# Patient Record
Sex: FEMALE | Race: WHITE | NOT HISPANIC OR LATINO | Employment: OTHER | URBAN - METROPOLITAN AREA
[De-identification: names, ages, dates, MRNs, and addresses within clinical notes are randomized per-mention and may not be internally consistent; named-entity substitution may affect disease eponyms.]

---

## 2017-01-05 ENCOUNTER — TRANSFERRED RECORDS (OUTPATIENT)
Dept: HEALTH INFORMATION MANAGEMENT | Facility: CLINIC | Age: 67
End: 2017-01-05

## 2017-01-05 LAB
ALBUMIN SERPL-MCNC: NORMAL G/DL
ALP SERPL-CCNC: NORMAL U/L
ALT SERPL-CCNC: NORMAL U/L
ANION GAP SERPL CALCULATED.3IONS-SCNC: NORMAL MMOL/L
AST SERPL-CCNC: NORMAL U/L
BASOPHILS NFR BLD AUTO: 0.5 %
BILIRUB SERPL-MCNC: NORMAL MG/DL
BUN SERPL-MCNC: NORMAL MG/DL
CALCIUM SERPL-MCNC: NORMAL MG/DL
CHLORIDE SERPLBLD-SCNC: NORMAL MMOL/L
CHOLEST SERPL-MCNC: 236 MG/DL
CO2 SERPL-SCNC: NORMAL MMOL/L
CREAT SERPL-MCNC: NORMAL MG/DL
EOSINOPHIL NFR BLD AUTO: 3.6 %
ERYTHROCYTE [DISTWIDTH] IN BLOOD BY AUTOMATED COUNT: 13.7 %
GFR SERPL CREATININE-BSD FRML MDRD: NORMAL ML/MIN/1.73M2
GLUCOSE SERPL-MCNC: NORMAL MG/DL (ref 70–99)
HCT VFR BLD AUTO: 43.9 %
HDLC SERPL-MCNC: 96 MG/DL
HEMOGLOBIN: 14.2 G/DL (ref 11.7–15.7)
LDLC SERPL CALC-MCNC: 130.9 MG/DL
LYMPHOCYTES NFR BLD AUTO: 41.3 %
MCH RBC QN AUTO: 30.5 PG
MCHC RBC AUTO-ENTMCNC: 32.3 G/DL
MCV RBC AUTO: 94 FL
MONOCYTES NFR BLD AUTO: 9.1 %
NEUTROPHILS NFR BLD AUTO: 45.5 %
NONHDLC SERPL-MCNC: NORMAL MG/DL
PLATELET COUNT - QUEST: 274 10^9/L (ref 150–450)
POTASSIUM SERPL-SCNC: NORMAL MMOL/L
PROT SERPL-MCNC: NORMAL G/DL
RBC # BLD AUTO: 4.65 10^12/L
SODIUM SERPL-SCNC: NORMAL MMOL/L
TRIGL SERPL-MCNC: 45 MG/DL
TSH SERPL-ACNC: 0.71 MCU/ML
WBC # BLD AUTO: 4.2 10^9/L

## 2017-03-22 DIAGNOSIS — E78.00 HIGH BLOOD CHOLESTEROL: ICD-10-CM

## 2017-03-22 DIAGNOSIS — E03.8 OTHER SPECIFIED HYPOTHYROIDISM: Primary | ICD-10-CM

## 2017-03-22 DIAGNOSIS — R73.03 PRE-DIABETES: ICD-10-CM

## 2017-03-22 NOTE — NURSING NOTE
Pt requesting A1C Labs be done at next appointment with Dr. Buckley scheduled on 4/10. I didn't see Orders in chart for labs, so noted in appointment notes and sending message to give heads-up.     Pt also took a double dose of her thyroid medication today and wanted to know if she should skip next dose?     Please give Pt a call back at [phone]. Okay to leave message.     Thank you,     Diana   Call Center     Per Ina, she will order lipid panel and TSH reflex, but no A1C needed.  As for the thyroid med, she should skip tomorrows dose , then go back to regular one dose daily on Friday.     Called pt and had to LVM with this info.  Was Okay to LVM.  Lab orders placed     Brittney Rushing RN  March 22, 2017 1:07 PM

## 2017-04-04 DIAGNOSIS — E03.9 HYPOTHYROIDISM, UNSPECIFIED TYPE: ICD-10-CM

## 2017-04-04 RX ORDER — LEVOTHYROXINE SODIUM 100 MCG
100 TABLET ORAL DAILY
Qty: 90 TABLET | Refills: 0 | Status: SHIPPED | OUTPATIENT
Start: 2017-04-04 | End: 2017-04-10

## 2017-04-10 ENCOUNTER — OFFICE VISIT (OUTPATIENT)
Dept: FAMILY MEDICINE | Facility: CLINIC | Age: 67
End: 2017-04-10

## 2017-04-10 VITALS
DIASTOLIC BLOOD PRESSURE: 67 MMHG | BODY MASS INDEX: 22.01 KG/M2 | WEIGHT: 132.12 LBS | OXYGEN SATURATION: 99 % | HEART RATE: 69 BPM | HEIGHT: 65 IN | TEMPERATURE: 97.7 F | SYSTOLIC BLOOD PRESSURE: 99 MMHG

## 2017-04-10 DIAGNOSIS — M26.609 TMJ (TEMPOROMANDIBULAR JOINT SYNDROME): ICD-10-CM

## 2017-04-10 DIAGNOSIS — R73.03 PRE-DIABETES: Primary | ICD-10-CM

## 2017-04-10 DIAGNOSIS — E03.9 HYPOTHYROIDISM, UNSPECIFIED TYPE: ICD-10-CM

## 2017-04-10 RX ORDER — LEVOTHYROXINE SODIUM 100 MCG
100 TABLET ORAL DAILY
Qty: 90 TABLET | Refills: 3 | Status: SHIPPED | OUTPATIENT
Start: 2017-04-10 | End: 2018-06-12 | Stop reason: DRUGHIGH

## 2017-04-10 ASSESSMENT — PAIN SCALES - GENERAL: PAINLEVEL: NO PAIN (0)

## 2017-04-10 NOTE — NURSING NOTE
"66 year old  Chief Complaint   Patient presents with     Refill Request     on Synthroid.     Results     review her lab results.       Blood pressure 99/67, pulse 69, temperature 97.7  F (36.5  C), height 5' 5.35\" (166 cm), weight 132 lb 1.9 oz (59.9 kg), SpO2 99 %. Body mass index is 21.75 kg/(m^2).  Patient Active Problem List   Diagnosis     Migraines     Glaucoma     Pre-diabetes     Leg cramps     Stress at work     Muscle twitching     Screening for diabetes mellitus     Hypothyroidism     Family history of malignant neoplasm of breast       Wt Readings from Last 2 Encounters:   04/10/17 132 lb 1.9 oz (59.9 kg)   12/18/15 131 lb 0.6 oz (59.4 kg)     BP Readings from Last 3 Encounters:   04/10/17 99/67   12/18/15 104/69   07/07/15 118/76         Current Outpatient Prescriptions   Medication     CALCIUM CITRATE PO     Naproxen Sodium (ALEVE PO)     Acetaminophen (TYLENOL EXTRA STRENGTH PO)     SYNTHROID 100 MCG tablet     frovatriptan (FROVA) 2.5 MG tablet     order for DME     UNABLE TO FIND     UNABLE TO FIND     UNABLE TO FIND     blood glucose test strip     estradiol (VAGIFEM) 10 MCG TABS     metroNIDAZOLE 0.75 % LOTN     brinzolamide (AZOPT) 1 % ophthalmic suspension     polyethylene glycol - propylene glycol (SYSTANE ULTRA) 0.4-0.3 % SOLN     Multiple Vitamin (MULTIVITAMIN) per tablet     Cholecalciferol (VITAMIN D) 1000 UNIT capsule     No current facility-administered medications for this visit.        Social History   Substance Use Topics     Smoking status: Never Smoker     Smokeless tobacco: Not on file     Alcohol use Yes      Comment: seldom       Health Maintenance Due   Topic Date Due     FOOT EXAM Q1 YEAR( NO INBASKET)  04/21/1951     MICROALBUMIN Q1 YEAR( NO INBASKET)  04/21/1951     ADVANCE DIRECTIVE PLANNING Q5 YRS (NO INBASKET)  04/21/1968     HEPATITIS C SCREENING  04/21/1968     FALL RISK ASSESSMENT  04/21/2015     PNEUMOCOCCAL (2 of 2 - PPSV23) 09/14/2016     CREATININE Q1 YEAR (NO " INBASKET)  12/18/2016     LIPID MONITORING Q1 YEAR( NO INBASKET)  12/18/2016     A1C Q6 MO( NO INBASKET)  02/04/2017       No results found for: DAWN Judd CMA  April 10, 2017 2:38 PM

## 2017-04-10 NOTE — PROGRESS NOTES
"Keven Lopez is a 66 year old female here for the following issues:       Hypothyroidism  Sami is here for thyroid check. She is on 100mg of Synthroid (name brand) and takes it daily on an empty stomach. She lives in [place] from Jan - March and sees a doctor there. She brings in labs. Last TSH was drawn in Jan 2017 and level was 0.76. She is feeling well. Her weight is up slightly. No constipation/diarrhea. Skin is drier since moving back to [place].       Pre-diabetes  A1c is 5.8% in January with doctor in [place], fasting sugar was 86 at that time. Historically her A1c was 6.5% in 2010. Her father and PGF had DM. She also has siblings that have DM. They are overweight.     She is exercising 3x per week     Diet: low carb diet, 3 meals a day    Migraines  She reports a history of migraines, worse when she has had poor sleep. Other triggers are caffeine, alcohol, citrus and weather changes. She sees a neurologist that prescribes Frova.    TMJ-left side  Left sided jaw pain, she was told she had \"bone on bone\" at her left TMJ. She clenches, wears a bite guard. She uses Aleve and tylenol.  She reports that surgery was proposed at one point. She prefers non surgical options. She follows with Dr. Marcelo Romero,  in [place], for her care.     Patient Active Problem List   Diagnosis     Migraines     Glaucoma     Pre-diabetes     Leg cramps     Stress at work     Muscle twitching     Screening for diabetes mellitus     Hypothyroidism     Family history of malignant neoplasm of breast       Current Outpatient Prescriptions   Medication Sig Dispense Refill     SYNTHROID 100 MCG tablet Take 1 tablet (100 mcg) by mouth daily 90 tablet 0     frovatriptan (FROVA) 2.5 MG tablet Take 1 tablet (2.5 mg) by mouth at onset of headache for migraine 4 tablet      order for DME Patient Keven Lopez was set up at [facility] on December 7, 2015. Patient received a Resmed AirSense 10 Auto. Pressures were set " "at Auto 7 - 12 cm H2O.   Patient s ramp is 5 cm H2O for Auto and FLEX/EPR is 3.  Patient received a [employer] Resp[employer] Mask name: Nuance Pro Gel  Pillow mask Size Small Pillows, heated tubing and heated humidifier.  Patient is enrolled in the STM Program and does need to meet compliance. Patient has a follow up on 12/18/2015 with Dr. Lopez.   Sarah Mott       UNABLE TO FIND MEDICATION NAME: Adaptocrine       UNABLE TO FIND MEDICATION NAME: Ilya Cason       UNABLE TO FIND MEDICATION NAME: Macy Westbrook       blood glucose test strip Use to test blood sugar 2 times daily or as directed. 1 Month 11     estradiol (VAGIFEM) 10 MCG TABS Place 10 mcg vaginally. Two times a week       metroNIDAZOLE 0.75 % LOTN Externally apply  topically daily. Indications: Acne of Unknown cause Usually in Older Age Groups       brinzolamide (AZOPT) 1 % ophthalmic suspension Place 1 drop into both eyes 2 times daily.       polyethylene glycol - propylene glycol (SYSTANE ULTRA) 0.4-0.3 % SOLN Place 1 drop into both eyes every hour as needed.       Multiple Vitamin (MULTIVITAMIN) per tablet Take 1 tablet by mouth daily. With 400 mg of Calcium in it       Cholecalciferol (VITAMIN D) 1000 UNIT capsule Take 1 capsule by mouth daily.         No Known Allergies     EXAM  BP 99/67 (BP Location: Left arm, Patient Position: Chair, Cuff Size: Adult Regular)  Pulse 69  Temp 97.7  F (36.5  C)  Ht 5' 5.35\" (166 cm)  Wt 132 lb 1.9 oz (59.9 kg)  SpO2 99%  BMI 21.75 kg/m2  Gen: Alert, pleasant, NAD  HEENT: neck without LAD, thyroid without masses  Tenderness with palpation over masseter muscles, no trismus  COR: S1,S2, no murmur  Lungs: CTA bilaterally, no rhonchi, wheezes or rales  Neuro: DTR +2/4 in all extremities      Assessment:  (E03.8) Other specified hypothyroidism  (primary encounter diagnosis)  Comment: recent TSH 0.78, checked in January 2017 at doctor's office in [place]  Plan: TSH with free T4 reflex        Will refill " Synthroid brand 100 mcg daily.    (R73.03) Pre-diabetes  Comment: A1c was 5.8% in January  Plan: Hemoglobin A1c ([place])        She will see Dr. Vann, endocrinologist in August, she may return in June to have A1c checked again. Discussed health diet, regular exercise program. No need for medication at this time.     Raven Buckley MD  Internal Medicine/Pediatrics

## 2017-04-10 NOTE — MR AVS SNAPSHOT
After Visit Summary   4/10/2017    Keven Lopez    MRN: [id number]           Patient Information     Date Of Birth          1950        Visit Information        Provider Department      4/10/2017 2:40 PM Raven Buckley MD [facility]        Today's Diagnoses     Pre-diabetes    -  1    Hypothyroidism, unspecified type           Follow-ups after your visit        Your next 10 appointments already scheduled     Aug 01, 2017 11:00 AM CDT   (Arrive by 10:45 AM)   RETURN ENDOCRINE with Salena Vann MD   [facility] ([facility])    [address] [place] [id number] [phone]              Who to contact     Please call your clinic at [phone] to:    Ask questions about your health    Make or cancel appointments    Discuss your medicines    Learn about your test results    Speak to your doctor   If you have compliments or concerns about an experience at your clinic, or if you wish to file a complaint, please contact [facility] Patient Relations at [phone] or email us at [email or web address]         Additional Information About Your Visit        MyChart Information     KlickExt gives you secure access to your electronic health record. If you see a primary care provider, you can also send messages to your care team and make appointments. If you have questions, please call your primary care clinic.  If you do not have a primary care provider, please call [phone] and they will assist you.      UBIKOD is an electronic gateway that provides easy, online access to your medical records. With UBIKOD, you can request a clinic appointment, read your test results, renew a prescription or communicate with your care team.     To access your existing account, please contact your [facility] or call [phone] for assistance.        Care  "EveryWhere ID     This is your Care EveryWhere ID. This could be used by other organizations to access your [facility] medical records  [id number]        Your Vitals Were     Pulse Temperature Height Pulse Oximetry BMI (Body Mass Index)       69 97.7  F (36.5  C) 5' 5.35\" (166 cm) 99% 21.75 kg/m2        Blood Pressure from Last 3 Encounters:   04/10/17 99/67   12/18/15 104/69   07/07/15 118/76    Weight from Last 3 Encounters:   04/10/17 132 lb 1.9 oz (59.9 kg)   12/18/15 131 lb 0.6 oz (59.4 kg)   07/07/15 130 lb 8 oz (59.2 kg)              Today, you had the following     No orders found for display         Where to get your medicines      These medications were [facility] to [id number] - [address] [address]     Phone:  [phone]     SYNTHROID 100 MCG tablet          Primary Care Provider Office Phone # Fax #    Raven Buckley [phone]       [facility] [address] [place] [id number]        Thank you!     Thank you for choosing [facility]  for your care. Our goal is always to provide you with excellent care. Hearing back from our patients is one way we can continue to improve our services. Please take a few minutes to complete the written survey that you may receive in the mail after your visit with us. Thank you!             Your Updated Medication List - Protect others around you: Learn how to safely use, store and throw away your medicines at www.disposemymeds.org.          This list is accurate as of: 4/10/17  3:15 PM.  Always use your most recent med list.                   Brand Name Dispense Instructions for use    ALEVE PO          blood glucose monitoring test strip    no brand specified    1 Month    Use to test blood sugar 2 times daily or as directed.       brinzolamide 1 % ophthalmic susp    AZOPT     Place 1 drop into both eyes 2 times daily.       CALCIUM CITRATE PO      " Take 400 mg by mouth       frovatriptan 2.5 MG tablet    FROVA    4 tablet    Take 1 tablet (2.5 mg) by mouth at onset of headache for migraine       metroNIDAZOLE 0.75 % Lotn      Externally apply  topically daily. Indications: Acne of Unknown cause Usually in Older Age Groups       multivitamin per tablet      Take 1 tablet by mouth daily. With 400 mg of Calcium in it       order for DME      Patient Keven Lopez was set up at [facility] on December 7, 2015. Patient received a Resmed AirSense 10 Auto. Pressures were set at Auto 7 - 12 cm H2O.   Patient?s ramp is 5 cm H2O for Auto and FLEX/EPR is 3.  Patient received a [employer] Mask name: Nuance Pro Gel  Pillow mask Size Small Pillows, heated tubing and heated humidifier.  Patient is enrolled in the STM Program and does need to meet compliance. Patient has a follow up on 12/18/2015 with Dr. Lopez.  Sarah Mott       SYNTHROID 100 MCG tablet   Generic drug:  levothyroxine     90 tablet    Take 1 tablet (100 mcg) by mouth daily       SYSTANE ULTRA 0.4-0.3 % Soln ophthalmic solution   Generic drug:  polyethylene glycol 0.4%- propylene glycol 0.3%      Place 1 drop into both eyes every hour as needed.       TYLENOL EXTRA STRENGTH PO          * UNABLE TO FIND      MEDICATION NAME: Adaptocrine       * UNABLE TO FIND      MEDICATION NAME: Ilya Cason       * UNABLE TO FIND      MEDICATION NAME: Macy Westbrook       VAGIFEM 10 MCG Tabs vaginal tablet   Generic drug:  estradiol      Place 10 mcg vaginally. Two times a week       vitamin D 1000 UNITS capsule      Take 1 capsule by mouth daily.       * Notice:  This list has 3 medication(s) that are the same as other medications prescribed for you. Read the directions carefully, and ask your doctor or other care provider to review them with you.

## 2017-05-25 ENCOUNTER — OFFICE VISIT (OUTPATIENT)
Dept: OTOLARYNGOLOGY | Facility: CLINIC | Age: 67
End: 2017-05-25
Payer: COMMERCIAL

## 2017-05-25 VITALS — HEIGHT: 65 IN | TEMPERATURE: 97.3 F | RESPIRATION RATE: 16 BRPM | BODY MASS INDEX: 22.49 KG/M2 | WEIGHT: 135 LBS

## 2017-05-25 DIAGNOSIS — J32.0 CHRONIC MAXILLARY SINUSITIS: Primary | ICD-10-CM

## 2017-05-25 PROCEDURE — 31231 NASAL ENDOSCOPY DX: CPT | Performed by: OTOLARYNGOLOGY

## 2017-05-25 PROCEDURE — 99203 OFFICE O/P NEW LOW 30 MIN: CPT | Mod: 25 | Performed by: OTOLARYNGOLOGY

## 2017-05-25 NOTE — NURSING NOTE
"Chief Complaint   Patient presents with     Eye Problem     swelling under eyes       Initial Temp 97.3  F (36.3  C) (Oral)  Resp 16  Ht 1.66 m (5' 5.35\")  Wt 61.2 kg (135 lb)  BMI 22.23 kg/m2 Estimated body mass index is 22.23 kg/(m^2) as calculated from the following:    Height as of this encounter: 1.66 m (5' 5.35\").    Weight as of this encounter: 61.2 kg (135 lb).  Medication Reconciliation: complete     Kiana Jose CMA      "

## 2017-05-25 NOTE — PROGRESS NOTES
History of Present Illness - Keven Lopez is a 67 year old female with concern for swelling around her eyes. She has been seen my me in the past at the [facility]. She also has left sided TMJ, and uses a nighttime dental device for this.     In 2012, she had surgical procedure including septoplasty and submucosal resection of turbinates.     She explains that she has been getting swelling beneath her left eye. She has been diagnosed with glaucoma in the past, which did induce a bleed that her ophthalmologist feels might be related to her issues with migraines. She was also started on over the counter allergy medications, and told to follow up with her ENT if there was no improvement in her symptoms. Her migraines have been getting worse and worse. Her grandmother, father, and 5 of her siblings have migraines.     She denies any pressure or drainage from her sinuses, but she has been using Breathe-Rite strips at night for sleep as she feels that her left nostril is compromised from the swelling.       Past Medical History -   Past Medical History:   Diagnosis Date     Glaucoma     recently diagnosed     Microscopic hematuria     Work-up negative     Migraines      Prediabetes      Sleep apnea     mild, uses dental device     Thyroid disease      TMJ (dislocation of temporomandibular joint)        Current Medications -   Current Outpatient Prescriptions:      CALCIUM CITRATE PO, Take 400 mg by mouth, Disp: , Rfl:      Naproxen Sodium (ALEVE PO), , Disp: , Rfl:      Acetaminophen (TYLENOL EXTRA STRENGTH PO), , Disp: , Rfl:      SYNTHROID 100 MCG tablet, Take 1 tablet (100 mcg) by mouth daily, Disp: 90 tablet, Rfl: 3     frovatriptan (FROVA) 2.5 MG tablet, Take 1 tablet (2.5 mg) by mouth at onset of headache for migraine, Disp: 4 tablet, Rfl:      order for DME, Patient Keven Lopez was set up at [facility] on December 7, 2015. Patient received a Resmed AirSense 10 Auto. Pressures were  set at Auto 7 - 12 cm H2O.   Patient s ramp is 5 cm H2O for Auto and FLEX/EPR is 3.  Patient received a Leticia Respironics Mask name: Nuance Pro Gel  Pillow mask Size Small Pillows, heated tubing and heated humidifier.  Patient is enrolled in the STM Program and does need to meet compliance. Patient has a follow up on 12/18/2015 with Dr. Lopez.  Sarah Mott, Disp: , Rfl:      UNABLE TO FIND, MEDICATION NAME: Adaptocrine, Disp: , Rfl:      UNABLE TO FIND, MEDICATION NAME: Ilya Cason, Disp: , Rfl:      UNABLE TO FIND, MEDICATION NAME: Macy Westbrook, Disp: , Rfl:      blood glucose test strip, Use to test blood sugar 2 times daily or as directed., Disp: 1 Month, Rfl: 11     estradiol (VAGIFEM) 10 MCG TABS, Place 10 mcg vaginally. Two times a week, Disp: , Rfl:      metroNIDAZOLE 0.75 % LOTN, Externally apply  topically daily. Indications: Acne of Unknown cause Usually in Older Age Groups, Disp: , Rfl:      brinzolamide (AZOPT) 1 % ophthalmic suspension, Place 1 drop into both eyes 2 times daily., Disp: , Rfl:      polyethylene glycol - propylene glycol (SYSTANE ULTRA) 0.4-0.3 % SOLN, Place 1 drop into both eyes every hour as needed., Disp: , Rfl:      Multiple Vitamin (MULTIVITAMIN) per tablet, Take 1 tablet by mouth daily. With 400 mg of Calcium in it, Disp: , Rfl:      Cholecalciferol (VITAMIN D) 1000 UNIT capsule, Take 1 capsule by mouth daily., Disp: , Rfl:     Allergies - No Known Allergies    Social History -   Social History     Social History     Marital status:      Spouse name: N/A     Number of children: N/A     Years of education: N/A     Social History Main Topics     Smoking status: Never Smoker     Smokeless tobacco: None     Alcohol use Yes      Comment: seldom     Drug use: No     Sexual activity: Yes     Partners: Male     Other Topics Concern     None     Social History Narrative       Family History -   Family History   Problem Relation Age of Onset     DIABETES Father       "DIABETES Sister      Breast Cancer Sister 60       Review of Systems - As per HPI and PMHx, otherwise review of system review of the head and neck negative.    This document serves as a record of the services and decisions personally performed and made by Fredrick Lopez MD. It was created on his/her behalf by Trina Waldrop, a trained medical scribe. The creation of this document is based the provider's statements to the medical scribe.    Scribe Trina Waldrop 9:32 AM, May 25, 2017    Physical Exam  Temp 97.3  F (36.3  C) (Oral)  Resp 16  Ht 1.66 m (5' 5.35\")  Wt 61.2 kg (135 lb)  BMI 22.23 kg/m2  BMI: Body mass index is 22.23 kg/(m^2).    General - The patient is well nourished and well developed, and appears to have good nutritional status.  Alert and oriented to person and place, answers questions and cooperates with examination appropriately.    Skin - No suspicious lesions or rashes.  Respiration - No respiratory distress.  Head and Face - Normocephalic and atraumatic, with no gross asymmetry noted of the contour of the facial features.  The facial nerve is intact, with strong symmetric movements. There is no pain to palpation over the sinuses bilaterally.     Voice and Breathing - The patient was breathing comfortably without the use of accessory muscles. There was no wheezing, stridor, or stertor.  The patients voice was clear and strong, and had appropriate pitch and quality.    Ears - Bilateral pinna and EACs with normal appearing overlying skin. Tympanic membrane intact with good mobility on pneumatic otoscopy bilaterally. Bony landmarks of the ossicular chain are normal. The tympanic membranes are normal in appearance. No retraction, perforation, or masses.  No fluid or purulence was seen in the external canal or the middle ear.     Eyes - Extraocular movements intact.  Sclera were not icteric or injected, conjunctiva were pink and moist.    Mouth - Examination of the oral cavity showed pink, " healthy oral mucosa. No lesions or ulcerations noted.  The tongue was mobile and midline, and the dentition were in good condition.      Throat - The walls of the oropharynx were smooth, pink, moist, symmetric, and had no lesions or ulcerations.  The tonsillar pillars and soft palate were symmetric.  The uvula was midline on elevation.    Neck - Normal midline excursion of the laryngotracheal complex during swallowing.  Full range of motion on passive movement.  Palpation of the occipital, submental, submandibular, internal jugular chain, and supraclavicular nodes did not demonstrate any abnormal lymph nodes or masses.  The carotid pulse was palpable bilaterally.  Palpation of the thyroid was soft and smooth, with no nodules or goiter appreciated.  The trachea was mobile and midline.    Nose - External contour is symmetric, no gross deflection or scars.  Nasal mucosa is pink and moist with no abnormal mucus.  The septum was midline and non-obstructive, turbinates of normal size and position.  No polyps, masses, or purulence noted on examination.    Neuro - Nonfocal neuro exam is normal, CN 2 through 12 intact, normal gait and muscle tone.    Performed in clinic today:  To further evaluate the nasal cavity, I performed rigid nasal endoscopy.  I first sprayed the nasal cavity bilaterally with a mix of lidocaine and neosynephrine.  I then began on the left side using a 2.7mm, 30 degree rigid nasal endoscope.  The septum was straight and the nasal airway was open.  No abnormal secretions, purulence, or polyps were noted. The left middle turbinate and middle meatus were clearly visualized and normal in appearance.  Looking up, the olfactory cleft was unobstructed.  Going further back, the sphenoethmoid recess was normal in appearance, with healthy appearing mucosa on the face of the sphenoid.  The nasopharynx was unremarkable, and the eustachian tube opening on this side was unobstructed.    I then turned my attention to  the right side.  Once again, the septum was straight, and the airway was open.  No abnormal secretions, purulence, polyps were noted.  The right middle turbinate and middle meatus were clearly visualized and normal in appearance.  Looking up, the olfactory cleft was unobstructed.  Going further back the right sphenoethmoid recess was normal in appearance, and eustachian tube opening was unobstructed.   [place] - [id number] [facility]      A/P - Debbienikki CHU Lopez is a 67 year old female with past medical history including chronic sinusitis. She will schedule [facility] scan at [facility], and will have the results sent to me.       The information in this document, created by the medical scribe for me, accurately reflects the services I personally performed and the decisions made by me. I have reviewed and approved this document for accuracy prior to leaving the patient care area.  Fredrick Lopez MD  9:32 AM, 05/25/17     Fredrick Lopez MD

## 2017-05-25 NOTE — MR AVS SNAPSHOT
After Visit Summary   5/25/2017    Keven Lopez    MRN: [id number]           Patient Information     Date Of Birth          1950        Visit Information        Provider Department      5/25/2017 9:30 AM Fredrick Lopez MD [facility]        Today's Diagnoses     Chronic maxillary sinusitis    -  1      Care Instructions    Scheduling Information  To schedule your CT/MRI scan, please contact [facility] at [phone] OR [facility] at [phone]    To schedule your Surgery, please contact our Specialty Schedulers at [phone]      ENT Clinic Locations Clinic Hours Telephone Number     [facility]  [address] [place] [address]   Monday:           1:00pm -- 5:00pm    Friday:              8:00am - 12:00pm   To schedule/reschedule an appointment with   Dr. Lopez,   please contact our   Specialty Scheduling Department at:     [phone]       [facility]  [address] [place] [address] Tuesday:          8:00am -- 2:00pm         Urgent Care Locations Clinic Hours Telephone Numbers     [facility]  [address] [place] [address]     Monday-Friday:     11:00am - 9:00pm    Saturday-Sunday:  9:00am - 5:00pm   [phone]     [facility]  [address] [place] [address]. [place] [address]     Monday-Friday:      5:00pm - 9:00pm     Saturday-Sunday:  9:00am - 5:00pm   [phone]                   Follow-ups after your visit        Your next 10 appointments already scheduled     Aug 01, 2017 11:00 AM CDT   (Arrive by 10:45 AM)   RETURN ENDOCRINE with Salena Vann MD   [facility] Endocrinology ([facility])    [address] [place] [address] [phone]              Future tests that were ordered for you today     Open Future Orders        Priority Expected Expires Ordered    CT Maxillofacial w/o Contrast Routine  5/25/2018 5/25/2017     "        Who to contact     If you have questions or need follow up information about today's clinic visit or your schedule please contact [facility] directly at [phone].  Normal or non-critical lab and imaging results will be communicated to you by MyChart, letter or phone within 4 business days after the clinic has received the results. If you do not hear from us within 7 days, please contact the clinic through MyChart or phone. If you have a critical or abnormal lab result, we will notify you by phone as soon as possible.  Submit refill requests through [employer] or call your pharmacy and they will forward the refill request to us. Please allow 3 business days for your refill to be completed.          Additional Information About Your Visit        [employer] Information     [employer] gives you secure access to your electronic health record. If you see a primary care provider, you can also send messages to your care team and make appointments. If you have questions, please call your primary care clinic.  If you do not have a primary care provider, please call [phone] and they will assist you.        Care EveryWhere ID     This is your Care EveryWhere ID. This could be used by other organizations to access your [facility] medical records  [id number]        Your Vitals Were     Temperature Respirations Height BMI (Body Mass Index)          97.3  F (36.3  C) (Oral) 16 1.66 m (5' 5.35\") 22.23 kg/m2         Blood Pressure from Last 3 Encounters:   04/10/17 99/67   12/18/15 104/69   07/07/15 118/76    Weight from Last 3 Encounters:   05/25/17 61.2 kg (135 lb)   04/10/17 59.9 kg (132 lb 1.9 oz)   12/18/15 59.4 kg (131 lb 0.6 oz)              We Performed the Following     NASAL ENDOSCOPY, DIAGNOSTIC        Primary Care Provider Office Phone # Fax #    Raven Buckley [phone]       [address] [facility] [address]        Thank you!     Thank you for " choosing [facility]  for your care. Our goal is always to provide you with excellent care. Hearing back from our patients is one way we can continue to improve our services. Please take a few minutes to complete the written survey that you may receive in the mail after your visit with us. Thank you!             Your Updated Medication List - Protect others around you: Learn how to safely use, store and throw away your medicines at www.disposemymeds.org.          This list is accurate as of: 5/25/17 12:41 PM.  Always use your most recent med list.                   Brand Name Dispense Instructions for use    ALEVE PO          blood glucose monitoring test strip    no brand specified    1 Month    Use to test blood sugar 2 times daily or as directed.       brinzolamide 1 % ophthalmic susp    AZOPT     Place 1 drop into both eyes 2 times daily.       CALCIUM CITRATE PO      Take 400 mg by mouth       frovatriptan 2.5 MG tablet    FROVA    4 tablet    Take 1 tablet (2.5 mg) by mouth at onset of headache for migraine       metroNIDAZOLE 0.75 % Lotn      Externally apply  topically daily. Indications: Acne of Unknown cause Usually in Older Age Groups       multivitamin per tablet      Take 1 tablet by mouth daily. With 400 mg of Calcium in it       order for DME      Patient Keven Lopez was set up at [facility] on December 7, 2015. Patient received a Resmed AirSense 10 Auto. Pressures were set at Auto 7 - 12 cm H2O.   Patient?s ramp is 5 cm H2O for Auto and FLEX/EPR is 3.  Patient received a [employer] Mask name: Nuance Pro Gel  Pillow mask Size Small Pillows, heated tubing and heated humidifier.  Patient is enrolled in the STM Program and does need to meet compliance. Patient has a follow up on 12/18/2015 with Dr. Lopez.  Sarah Mott       SYNTHROID 100 MCG tablet   Generic drug:  levothyroxine     90 tablet    Take 1 tablet (100 mcg) by mouth daily       SYSTANE ULTRA  0.4-0.3 % Soln ophthalmic solution   Generic drug:  polyethylene glycol 0.4%- propylene glycol 0.3%      Place 1 drop into both eyes every hour as needed.       TYLENOL EXTRA STRENGTH PO          * UNABLE TO FIND      MEDICATION NAME: Adaptocrine       * UNABLE TO FIND      MEDICATION NAME: Ilya Cason       * UNABLE TO FIND      MEDICATION NAME: Macy Olegario Westbrook       VAGIFEM 10 MCG Tabs vaginal tablet   Generic drug:  estradiol      Place 10 mcg vaginally. Two times a week       vitamin D 1000 UNITS capsule      Take 1 capsule by mouth daily.       * Notice:  This list has 3 medication(s) that are the same as other medications prescribed for you. Read the directions carefully, and ask your doctor or other care provider to review them with you.

## 2017-05-25 NOTE — PATIENT INSTRUCTIONS
Scheduling Information  To schedule your CT/MRI scan, please contact [facility] at [phone] OR [facility] at [phone]    To schedule your Surgery, please contact our Specialty Schedulers at [phone]      ENT Clinic Locations Clinic Hours Telephone Number     [facility]  [address] [place] [address]   Monday:           1:00pm -- 5:00pm    Friday:              8:00am - 12:00pm   To schedule/reschedule an appointment with   Dr. Lopez,   please contact our   Specialty Scheduling Department at:     [phone]       [facility]  [address] [place] [address] Tuesday:          8:00am -- 2:00pm         Urgent Care Locations Clinic Hours Telephone Numbers     [facility]  [address] [place] [address]     Monday-Friday:     11:00am - 9:00pm    Saturday-Sunday:  9:00am - 5:00pm   [phone]     [facility]  [address] [place] [address]     Monday-Friday:      5:00pm - 9:00pm     Saturday-Sunday:  9:00am - 5:00pm   [phone]

## 2017-05-30 ENCOUNTER — TRANSFERRED RECORDS (OUTPATIENT)
Dept: HEALTH INFORMATION MANAGEMENT | Facility: CLINIC | Age: 67
End: 2017-05-30

## 2017-06-09 DIAGNOSIS — R73.03 PRE-DIABETES: ICD-10-CM

## 2017-06-09 DIAGNOSIS — E03.8 OTHER SPECIFIED HYPOTHYROIDISM: ICD-10-CM

## 2017-06-09 DIAGNOSIS — E78.00 HIGH BLOOD CHOLESTEROL: ICD-10-CM

## 2017-06-09 LAB — HBA1C MFR BLD: 5.7 % (ref 4.1–5.7)

## 2017-07-01 ENCOUNTER — TRANSFERRED RECORDS (OUTPATIENT)
Dept: HEALTH INFORMATION MANAGEMENT | Facility: CLINIC | Age: 67
End: 2017-07-01

## 2017-07-17 ENCOUNTER — TRANSFERRED RECORDS (OUTPATIENT)
Dept: HEALTH INFORMATION MANAGEMENT | Facility: CLINIC | Age: 67
End: 2017-07-17

## 2017-07-18 ASSESSMENT — ENCOUNTER SYMPTOMS
JAUNDICE: 0
VOMITING: 0
DISTURBANCES IN COORDINATION: 0
RECTAL PAIN: 0
NUMBNESS: 0
EYE PAIN: 0
RECTAL BLEEDING: 0
TINGLING: 0
WEAKNESS: 0
DOUBLE VISION: 0
EYE WATERING: 0
BLOOD IN STOOL: 0
SPEECH CHANGE: 0
LOSS OF CONSCIOUSNESS: 0
PARALYSIS: 0
BOWEL INCONTINENCE: 0
TREMORS: 0
EYE REDNESS: 0
DIARRHEA: 1
NAUSEA: 0
MEMORY LOSS: 0
EYE IRRITATION: 1
CONSTIPATION: 1
SEIZURES: 0
DIZZINESS: 0
ABDOMINAL PAIN: 0
HEARTBURN: 0
BLOATING: 0
HEADACHES: 1

## 2017-07-21 ENCOUNTER — TRANSFERRED RECORDS (OUTPATIENT)
Dept: HEALTH INFORMATION MANAGEMENT | Facility: CLINIC | Age: 67
End: 2017-07-21

## 2017-07-29 ENCOUNTER — MYC MEDICAL ADVICE (OUTPATIENT)
Dept: ENDOCRINOLOGY | Facility: CLINIC | Age: 67
End: 2017-07-29

## 2017-07-31 NOTE — NURSING NOTE
Chief Complaint   Patient presents with     Results     Looking for dexa scan report. Terence has report of dexa scan in [facility], [facility]  Endocrinology & Diabetes 3G

## 2017-08-01 ENCOUNTER — OFFICE VISIT (OUTPATIENT)
Dept: ENDOCRINOLOGY | Facility: CLINIC | Age: 67
End: 2017-08-01

## 2017-08-01 VITALS
SYSTOLIC BLOOD PRESSURE: 104 MMHG | HEIGHT: 65 IN | HEART RATE: 86 BPM | DIASTOLIC BLOOD PRESSURE: 68 MMHG | BODY MASS INDEX: 21.99 KG/M2 | WEIGHT: 132 LBS

## 2017-08-01 DIAGNOSIS — M85.80 OSTEOPENIA, UNSPECIFIED LOCATION: ICD-10-CM

## 2017-08-01 DIAGNOSIS — R73.03 PRE-DIABETES: Primary | ICD-10-CM

## 2017-08-01 ASSESSMENT — PAIN SCALES - GENERAL: PAINLEVEL: NO PAIN (0)

## 2017-08-01 NOTE — LETTER
8/1/2017       RE: Keven Lopez  [address]  [address]  [place] [address]     Dear Colleague,    Thank you for referring your patient, Keven Lopez, to the [facility] ENDOCRINOLOGY at [facility]. Please see a copy of my visit note below.    This 67 year old woman returns to for f/u of her pre-diabetes and osteopenia.  She was first here several years ago to discuss dietary management of diabetes.  At that time, she said she changed her diet to a vegan diet because she was found to have diabetes based on a fasting sugar over 126.  She lost about 10 lbs on this diet and her diabetes resolved.  She had maintained that diet for several years and kept her A1cs in the 5 % range.  In the last year she has gained 3-5 lbs.     With respect to her osteopenia, she had her DEXA repeated before our visit and she is eager to hear how it looks.   She remains on calcium supplements, plus soy milk and cheese.  She takes vitamin D and a MVI.  She has no history of fracture and denies any back pain.  She is working with a  and is on a weight lifting program that has made her stronger.      She continues to suffer from migraines and is working with a neurologist on this.      Current Outpatient Prescriptions on File Prior to Visit:  CALCIUM CITRATE PO Take 400 mg by mouth   Naproxen Sodium (ALEVE PO)    Acetaminophen (TYLENOL EXTRA STRENGTH PO)    SYNTHROID 100 MCG tablet Take 1 tablet (100 mcg) by mouth daily   frovatriptan (FROVA) 2.5 MG tablet Take 1 tablet (2.5 mg) by mouth at onset of headache for migraine   order for DME Patient Keven Lopez was set up at [facility] on December 7, 2015. Patient received a Resmed AirSense 10 Auto. Pressures were set at Auto 7 - 12 cm H2O.   Patient s ramp is 5 cm H2O for Auto and FLEX/EPR is 3.  Patient received a [employer] Mask name: Nuance Pro Gel  Pillow mask Size Small  "Pillows, heated tubing and heated humidifier.  Patient is enrolled in the STM Program and does need to meet compliance. Patient has a follow up on 12/18/2015 with Dr. Lopez. Sarah Mott   UNABLE TO FIND MEDICATION NAME: Adaptocrine   UNABLE TO FIND MEDICATION NAME: Ilya Barry Yoav   UNABLE TO FIND MEDICATION NAME: Macy Olegario Glover Noemi Westbrook   blood glucose test strip Use to test blood sugar 2 times daily or as directed.   estradiol (VAGIFEM) 10 MCG TABS Place 10 mcg vaginally. Two times a week   metroNIDAZOLE 0.75 % LOTN Externally apply  topically daily. Indications: Acne of Unknown cause Usually in Older Age Groups   brinzolamide (AZOPT) 1 % ophthalmic suspension Place 1 drop into both eyes 2 times daily.   polyethylene glycol - propylene glycol (SYSTANE ULTRA) 0.4-0.3 % SOLN Place 1 drop into both eyes every hour as needed.   Multiple Vitamin (MULTIVITAMIN) per tablet Take 1 tablet by mouth daily. With 400 mg of Calcium in it   Cholecalciferol (VITAMIN D) 1000 UNIT capsule Take 1 capsule by mouth daily.     No current facility-administered medications on file prior to visit.     ROS: 10 point ROS neg other than the symptoms noted above in the HPI.    So Hx -     Vital signs:   /68  Pulse 86  Ht 1.651 m (5' 5\")  Wt 59.9 kg (132 lb)  BMI 21.97 kg/m2  Estimated body mass index is 21.97 kg/(m^2) as calculated from the following:    Height as of this encounter: 1.651 m (5' 5\").    Weight as of this encounter: 59.9 kg (132 lb).    NAD  Eyes - no periorbital edema, conjunctival injection, scleral icterus  CV - .  No edema  Skin - normal texture     Dexa scan from care everywhere:     This patient's T-score meets the World Health Organization   (WHO) criteria for low bone density (osteopenia) at one or more measured   sites (T-score between less than -1.0 and greater than -2.5).  The risk of   osteoporotic fracture increases approximately two-fold for each 1.0 SD   decrease in " T-score.    COMPARISON:  Comparison with previous study dated 04/02/2015 shows:    There was no statistically significant change in the bone mineral density   in the spine.    There was no statistically significant change in the bone mineral density   in the right hip.    There was no statistically significant change in the bone mineral density   in the left hip.      Comparison with baseline study dated 06/17/2003 shows:    There was a statistically significant decrease in the bone mineral density   in the spine.    There was a statistically significant decrease in the bone mineral density   in the right hip.    There was a statistically significant decrease in the bone mineral density   in the left hip.      Statistical significance is determined by the least significant change   (LSC) for the scanner and operators at this facility.    FRAX (WHO Fracture Risk Assessment Tool)    10-year Probability of Fracture:    Major Osteoporotic:  7.1%  Hip:  0.5%  Population:  [place] ()  Based on Dual Femur Left Neck BMD    More information and the calculation tool can be found at this website:    http://www.shef.ac.uk/FRAX/    The scan details are available in the patient's chart in [employer].    Diana Ca M.D.  Body/Breast Radiologist  [facility]   [email or web address]  TAO/sharmin  R:7/21/2017/T:7/21/2017      Result Narrative   DIAGNOSTIC DXA BONE MINERAL DENSITY, 7/21/2017    CLINICAL HISTORY:  This is a 67-year-old female patient.      RISK FACTORS:  The patient has estrogen deficiency.  The patient has a   history of low bone density based on a prior BMD study (osteopenia).  The   patient has a history of osteoporosis based on a prior BMD study.  The   patient has taken for at least one month the following medications in the   last year:  Synthroid.    TECHNIQUE:  Dual-energy x-ray absorptiometry system (axial skeleton).  The   patient was scanned on a GE Lunar Prodigy scanner,  fast-array scan mode.    The study was technically adequate.      FINDINGS:       BMD T-Score Z-Score   Lumbar Spine (L1 to L4) 0.934 g/cm2 -2.1 -0.2   Right Hip Femoral Neck 0.945 g/cm2 -0.7 1.0   Right Total Hip (BMD for comparison to prior) 0.969 g/cm2 -0.3 1.2   Left Hip Femoral Neck 0.934 g/cm2 -0.7 1.0   Left Total Hip (BMD for comparison to prior) 0.937 g/cm2 -0.6 0.9      Assessment/Plan:    1.  Osteopenia.  DEXA shows stable bone density.  She is on adequate vit D and calcium.  Suggested she have this repeated in ~ 5 years by her PCP.  No other rx necessary at this time.    2.  Prediabetes.  Will check A1c    If labs are normal, prn f/u.      I spent 25 minutes with this patient face to face and explained the conditions and plans (more than 50% of time was counseling/coordination of diabetes care and osteoporosis prevention) . The patient understood and is satisfied with today's visit.       Salena Vann MD

## 2017-08-01 NOTE — MR AVS SNAPSHOT
"              After Visit Summary   8/1/2017    Keven Lopez    MRN: [id number]           Patient Information     Date Of Birth          1950        Visit Information        Provider Department      8/1/2017 11:00 AM Salena Vann MD  [facility]        Today's Diagnoses     Pre-diabetes    -  1    Osteopenia, unspecified location           Follow-ups after your visit        Who to contact     Please call your clinic at [phone] to:    Ask questions about your health    Make or cancel appointments    Discuss your medicines    Learn about your test results    Speak to your doctor   If you have compliments or concerns about an experience at your clinic, or if you wish to file a complaint, please contact [facility] Patient Relations at [phone] or email us at [email or web address]         Additional Information About Your Visit        MyChart Information     [employer] gives you secure access to your electronic health record. If you see a primary care provider, you can also send messages to your care team and make appointments. If you have questions, please call your primary care clinic.  If you do not have a primary care provider, please call [phone] and they will assist you.      [employer] is an electronic gateway that provides easy, online access to your medical records. With [employer], you can request a clinic appointment, read your test results, renew a prescription or communicate with your care team.     To access your existing account, please contact your [facility] or call [phone] for assistance.        Care EveryWhere ID     This is your Care EveryWhere ID. This could be used by other organizations to access your [facility] medical records  [id number]        Your Vitals Were     Pulse Height BMI (Body Mass Index)             86 1.651 m (5' 5\") 21.97 kg/m2          Blood Pressure from Last 3 " Encounters:   08/01/17 104/68   04/10/17 99/67   12/18/15 104/69    Weight from Last 3 Encounters:   08/01/17 59.9 kg (132 lb)   05/25/17 61.2 kg (135 lb)   04/10/17 59.9 kg (132 lb 1.9 oz)              Today, you had the following     No orders found for display       Primary Care Provider Office Phone # Fax #    Raven Adilene Buckley [phone]       [address] [address]        Equal Access to Services     [facility]: Hadii melva mai hadasho Soteddy, waaxda luqadaha, qaybta kaalmada walker, viral de leon . So [facility] [phone].    ATENCIÓN: Si habla español, tiene a barron disposición servicios gratuitos de asistencia lingüística. [facility] [phone].    We comply with applicable federal civil rights laws and [place] laws. We do not discriminate on the basis of race, color, national origin, age, disability sex, sexual orientation or gender identity.            Thank you!     Thank you for choosing [facility] ENDOCRINOLOGY  for your care. Our goal is always to provide you with excellent care. Hearing back from our patients is one way we can continue to improve our services. Please take a few minutes to complete the written survey that you may receive in the mail after your visit with us. Thank you!             Your Updated Medication List - Protect others around you: Learn how to safely use, store and throw away your medicines at www.disposemymeds.org.          This list is accurate as of: 8/1/17  5:34 PM.  Always use your most recent med list.                   Brand Name Dispense Instructions for use Diagnosis    ALEVE PO           blood glucose monitoring test strip    no brand specified    1 Month    Use to test blood sugar 2 times daily or as directed.    Pre-diabetes       brinzolamide 1 % ophthalmic susp    AZOPT     Place 1 drop into both eyes 2 times daily.        CALCIUM CITRATE PO      Take 400 mg by mouth         frovatriptan 2.5 MG tablet    FROVA    4 tablet    Take 1 tablet (2.5 mg) by mouth at onset of headache for migraine        GABAPENTIN PO           MELATONIN PO      Take 1 mg by mouth        metroNIDAZOLE 0.75 % Lotn      Externally apply  topically daily. Indications: Acne of Unknown cause Usually in Older Age Groups        multivitamin per tablet      Take 1 tablet by mouth daily. With 400 mg of Calcium in it        order for DME      Patient Keven Lopez was set up at [facility] on December 7, 2015. Patient received a Resmed AirSense 10 Auto. Pressures were set at Auto 7 - 12 cm H2O.   Patient?s ramp is 5 cm H2O for Auto and FLEX/EPR is 3.  Patient received a [employer] Mask name: Nuance Pro Gel  Pillow mask Size Small Pillows, heated tubing and heated humidifier.  Patient is enrolled in the STM Program and does need to meet compliance. Patient has a follow up on 12/18/2015 with Dr. Lopez.  Sarah Mott        SYNTHROID 100 MCG tablet   Generic drug:  levothyroxine     90 tablet    Take 1 tablet (100 mcg) by mouth daily    Hypothyroidism, unspecified type       SYSTANE ULTRA 0.4-0.3 % Soln ophthalmic solution   Generic drug:  polyethylene glycol 0.4%- propylene glycol 0.3%      Place 1 drop into both eyes every hour as needed.        TYLENOL EXTRA STRENGTH PO           * UNABLE TO FIND      MEDICATION NAME: Adaptocrine        * UNABLE TO FIND      MEDICATION NAME: Ilya Cason        * UNABLE TO FIND      MEDICATION NAME: Macy Westbrook        VAGIFEM 10 MCG Tabs vaginal tablet   Generic drug:  estradiol      Place 10 mcg vaginally. Two times a week        vitamin D 1000 UNITS capsule      Take 1 capsule by mouth daily.        * Notice:  This list has 3 medication(s) that are the same as other medications prescribed for you. Read the directions carefully, and ask your doctor or other care provider to review them with you.

## 2017-08-01 NOTE — PROGRESS NOTES
This 67 year old woman returns to for f/u of her pre-diabetes and osteopenia.  She was first here several years ago to discuss dietary management of diabetes.  At that time, she said she changed her diet to a vegan diet because she was found to have diabetes based on a fasting sugar over 126.  She lost about 10 lbs on this diet and her diabetes resolved.  She had maintained that diet for several years and kept her A1cs in the 5 % range.  In the last year she has gained 3-5 lbs.     With respect to her osteopenia, she had her DEXA repeated before our visit and she is eager to hear how it looks.   She remains on calcium supplements, plus soy milk and cheese.  She takes vitamin D and a MVI.  She has no history of fracture and denies any back pain.  She is working with a  and is on a weight lifting program that has made her stronger.      She continues to suffer from migraines and is working with a neurologist on this.      Current Outpatient Prescriptions on File Prior to Visit:  CALCIUM CITRATE PO Take 400 mg by mouth   Naproxen Sodium (ALEVE PO)    Acetaminophen (TYLENOL EXTRA STRENGTH PO)    SYNTHROID 100 MCG tablet Take 1 tablet (100 mcg) by mouth daily   frovatriptan (FROVA) 2.5 MG tablet Take 1 tablet (2.5 mg) by mouth at onset of headache for migraine   order for DME Patient Keven Lopez was set up at [facility] on December 7, 2015. Patient received a Resmed AirSense 10 Auto. Pressures were set at Auto 7 - 12 cm H2O.   Patient s ramp is 5 cm H2O for Auto and FLEX/EPR is 3.  Patient received a [employer]icia [employer] Mask name: Nuance Pro Gel  Pillow mask Size Small Pillows, heated tubing and heated humidifier.  Patient is enrolled in the STM Program and does need to meet compliance. Patient has a follow up on 12/18/2015 with Dr. Lopez. Sarah Mott   UNABLE TO FIND MEDICATION NAME: Adaptocrine   UNABLE TO FIND MEDICATION NAME: Ilya Ma Wan   UNABLE TO FIND MEDICATION NAME:  "Macy Westbrook   blood glucose test strip Use to test blood sugar 2 times daily or as directed.   estradiol (VAGIFEM) 10 MCG TABS Place 10 mcg vaginally. Two times a week   metroNIDAZOLE 0.75 % LOTN Externally apply  topically daily. Indications: Acne of Unknown cause Usually in Older Age Groups   brinzolamide (AZOPT) 1 % ophthalmic suspension Place 1 drop into both eyes 2 times daily.   polyethylene glycol - propylene glycol (SYSTANE ULTRA) 0.4-0.3 % SOLN Place 1 drop into both eyes every hour as needed.   Multiple Vitamin (MULTIVITAMIN) per tablet Take 1 tablet by mouth daily. With 400 mg of Calcium in it   Cholecalciferol (VITAMIN D) 1000 UNIT capsule Take 1 capsule by mouth daily.     No current facility-administered medications on file prior to visit.     ROS: 10 point ROS neg other than the symptoms noted above in the HPI.    So Hx -     Vital signs:   /68  Pulse 86  Ht 1.651 m (5' 5\")  Wt 59.9 kg (132 lb)  BMI 21.97 kg/m2  Estimated body mass index is 21.97 kg/(m^2) as calculated from the following:    Height as of this encounter: 1.651 m (5' 5\").    Weight as of this encounter: 59.9 kg (132 lb).    NAD  Eyes - no periorbital edema, conjunctival injection, scleral icterus  CV - .  No edema  Skin - normal texture     Dexa scan from care everywhere:     This patient's T-score meets the World Health Organization   (WHO) criteria for low bone density (osteopenia) at one or more measured   sites (T-score between less than -1.0 and greater than -2.5).  The risk of   osteoporotic fracture increases approximately two-fold for each 1.0 SD   decrease in T-score.    COMPARISON:  Comparison with previous study dated 04/02/2015 shows:    There was no statistically significant change in the bone mineral density   in the spine.    There was no statistically significant change in the bone mineral density   in the right hip.    There was no statistically significant change in the bone mineral density   in " the left hip.      Comparison with baseline study dated 06/17/2003 shows:    There was a statistically significant decrease in the bone mineral density   in the spine.    There was a statistically significant decrease in the bone mineral density   in the right hip.    There was a statistically significant decrease in the bone mineral density   in the left hip.      Statistical significance is determined by the least significant change   (LSC) for the scanner and operators at this facility.    FRAX (WHO Fracture Risk Assessment Tool)    10-year Probability of Fracture:    Major Osteoporotic:  7.1%  Hip:  0.5%  Population:  [place] ()  Based on Dual Femur Left Neck BMD    More information and the calculation tool can be found at this website:    http://www.shef.ac.uk/FRAX/    The scan details are available in the patient's chart in [facility].    Diana Ca M.D.  Body/Breast Radiologist  [facility]   [email or web address]  TAO/sharmin  R:7/21/2017/T:7/21/2017      Result Narrative   DIAGNOSTIC DXA BONE MINERAL DENSITY, 7/21/2017    CLINICAL HISTORY:  This is a 67-year-old female patient.      RISK FACTORS:  The patient has estrogen deficiency.  The patient has a   history of low bone density based on a prior BMD study (osteopenia).  The   patient has a history of osteoporosis based on a prior BMD study.  The   patient has taken for at least one month the following medications in the   last year:  Synthroid.    TECHNIQUE:  Dual-energy x-ray absorptiometry system (axial skeleton).  The   patient was scanned on a GE Lunar Prodigy scanner, fast-array scan mode.    The study was technically adequate.      FINDINGS:       BMD T-Score Z-Score   Lumbar Spine (L1 to L4) 0.934 g/cm2 -2.1 -0.2   Right Hip Femoral Neck 0.945 g/cm2 -0.7 1.0   Right Total Hip (BMD for comparison to prior) 0.969 g/cm2 -0.3 1.2   Left Hip Femoral Neck 0.934 g/cm2 -0.7 1.0   Left Total Hip (BMD for comparison to  prior) 0.937 g/cm2 -0.6 0.9      Assessment/Plan:    1.  Osteopenia.  DEXA shows stable bone density.  She is on adequate vit D and calcium.  Suggested she have this repeated in ~ 5 years by her PCP.  No other rx necessary at this time.    2.  Prediabetes.  Will check A1c    If labs are normal, prn f/u.      I spent 25 minutes with this patient face to face and explained the conditions and plans (more than 50% of time was counseling/coordination of diabetes care and osteoporosis prevention) . The patient understood and is satisfied with today's visit.       Salena Vann MD

## 2017-11-28 DIAGNOSIS — Z80.3 FAMILY HISTORY OF MALIGNANT NEOPLASM OF BREAST: Primary | ICD-10-CM

## 2017-12-01 ENCOUNTER — TRANSFERRED RECORDS (OUTPATIENT)
Dept: HEALTH INFORMATION MANAGEMENT | Facility: CLINIC | Age: 67
End: 2017-12-01

## 2017-12-01 LAB — NEGATIVE: NORMAL

## 2018-04-11 ENCOUNTER — TRANSFERRED RECORDS (OUTPATIENT)
Dept: HEALTH INFORMATION MANAGEMENT | Facility: CLINIC | Age: 68
End: 2018-04-11

## 2018-04-17 ENCOUNTER — OFFICE VISIT (OUTPATIENT)
Dept: NEUROLOGY | Facility: CLINIC | Age: 68
End: 2018-04-17
Payer: COMMERCIAL

## 2018-04-17 VITALS
WEIGHT: 131.4 LBS | HEART RATE: 70 BPM | BODY MASS INDEX: 21.12 KG/M2 | SYSTOLIC BLOOD PRESSURE: 110 MMHG | HEIGHT: 66 IN | DIASTOLIC BLOOD PRESSURE: 72 MMHG

## 2018-04-17 DIAGNOSIS — G43.109 MIGRAINE WITH AURA AND WITHOUT STATUS MIGRAINOSUS, NOT INTRACTABLE: Primary | ICD-10-CM

## 2018-04-17 RX ORDER — FROVATRIPTAN SUCCINATE 2.5 MG/1
2.5 TABLET, FILM COATED ORAL
Qty: 6 TABLET | Refills: 11 | Status: SHIPPED | OUTPATIENT
Start: 2018-04-17 | End: 2019-07-16

## 2018-04-17 ASSESSMENT — ENCOUNTER SYMPTOMS
DECREASED LIBIDO: 1
MYALGIAS: 1
EYE WATERING: 1
DECREASED CONCENTRATION: 1
STIFFNESS: 1
MEMORY LOSS: 1
HEADACHES: 1
CONSTIPATION: 1
MUSCLE CRAMPS: 1
EYE IRRITATION: 1
BLOATING: 1
HOT FLASHES: 1

## 2018-04-17 ASSESSMENT — PAIN SCALES - GENERAL: PAINLEVEL: NO PAIN (0)

## 2018-04-17 NOTE — PATIENT INSTRUCTIONS
Decrease gabapentin to 300 mg twice a day for one week then 300 mg once a day for one week then stop.    Possible migraine preventives:  Topiramate 25 mg at night x 1 wk then 50 mg at night x 1 wk then 75 mg at night  Propranolol 20 mg once a day for one week then twice a day  Amitriptyline 10 mg at night x 1 wk then 20 mg at night 30 mg at night  Venlafaxine 75 mg once a day    Limit Frova use to 8 days a month

## 2018-04-17 NOTE — LETTER
"4/17/2018       RE: Keven Lopez  [address]  [address]  [place] [address]     Dear Colleague,    Thank you for referring your patient, Keven oLpez, to the [facility] NEUROLOGY at [facility]. Please see a copy of my visit note below.        [facility]    Keven Lopez MRN# [id number]   Age: 67 year old YOB: 1950     Requesting physician: Referred Self  Raven Buckley     Chief Complaint:    Transfer of care for migraine treatment    History of Present Illness:  Keven \"Sami\" Gladys Lopez is a 67-year-old woman who I followed for multiple years at [facility], [facility].  The patient transfers her care to the [facility].  She has been followed for intermittent migraine headaches that unfortunately over the last few years to become more severe and are now occurring more often than not consistent with chronic migraine headaches.    The patient was seen last July 2017 she was having 3-4 migraine days a week she was initiated on gabapentin working up to a dose of 300 mg 3 times a day and for about 6 months she had excellent response with very few migraines.  Since January her migraines and started coming back.  She reports that she is currently having migraine headaches that last anywhere from 1.5 days to 4 days.  They are occurring about 4-5 times a month.  She is currently treating with acupuncture.  She again brings up that she would like treatment with marijuana and in the past has tried marijuana when she was in [facility] and feels that it helps immensely.    In the past the patient has been hesitant to start other medications for migraine prevention because of her glaucoma.  She has recently switched physicians because her ophthalmologist retired.  She is now seeing Dr. Kierra Galvan who apparently has raised less concerns and her prior ophthalmologist about " topiramate.  It was an offer for her to have her glaucoma monitored if topiramate was initiated.    The patient also mentions in the past using venlafaxine for hot flash management and wonders if it might have helped migraines.    In the distant past when she was training she tried nortriptyline but does not remember dose nor effectiveness.    The patient continues to find that frovatriptan is the best triptan to abort the headaches but does notice that sometimes the migraine goes away for a day and then comes back a day later.    Past Medical History:   Diagnosis Date     Glaucoma     recently diagnosed     Hypothyroid      Microscopic hematuria     Work-up negative     Migraines      Prediabetes      Sleep apnea     mild, uses dental device     TMJ (dislocation of temporomandibular joint)        Patient Active Problem List   Diagnosis     Migraine with aura and without status migrainosus, not intractable     Glaucoma     Pre-diabetes     Leg cramps     Stress at work     Muscle twitching     Hypothyroidism     Family history of malignant neoplasm of breast       Past Surgical History:   Procedure Laterality Date     BREAST BIOPSY, RT/LT       COLONOSCOPY  2/27/2012    Procedure:COLONOSCOPY; COLONOSCOPY; Surgeon:JLUIS HUNTER; Location: GI     SEPTOPLASTY, TURBINOPLASTY, COMBINED  5/24/2012    Procedure:COMBINED SEPTOPLASTY, TURBINOPLASTY; Septoplasty, Submuccosal Resection Turbinates; Surgeon:AARON ATKINSON; Location:[facility] OR     urethral stricture dilatation         Social History     Social History     Marital status:      Spouse name: N/A     Number of children: N/A     Years of education: N/A     Occupational History     Not on file.     Social History Main Topics     Smoking status: Never Smoker     Smokeless tobacco: Never Used     Alcohol use No     Drug use: No     Sexual activity: Yes     Partners: Male     Other Topics Concern     Not on file     Social History Narrative       Family  History   Problem Relation Age of Onset     DIABETES Father      DIABETES Sister      Breast Cancer Sister 60     Migraines Brother        Current Outpatient Prescriptions   Medication Sig     frovatriptan (FROVA) 2.5 MG tablet Take 1 tablet (2.5 mg) by mouth at onset of headache for migraine     MELATONIN PO Take 1 mg by mouth     CALCIUM CITRATE PO Take 400 mg by mouth     Naproxen Sodium (ALEVE PO) Take by mouth as needed      Acetaminophen (TYLENOL EXTRA STRENGTH PO) Take by mouth as needed      SYNTHROID 100 MCG tablet Take 1 tablet (100 mcg) by mouth daily     estradiol (VAGIFEM) 10 MCG TABS Place 10 mcg vaginally. Two times a week     metroNIDAZOLE 0.75 % LOTN Externally apply  topically daily. Indications: Acne of Unknown cause Usually in Older Age Groups     brinzolamide (AZOPT) 1 % ophthalmic suspension Place 1 drop into both eyes 2 times daily.     polyethylene glycol - propylene glycol (SYSTANE ULTRA) 0.4-0.3 % SOLN Place 1 drop into both eyes every hour as needed.     Multiple Vitamin (MULTIVITAMIN) per tablet Take 1 tablet by mouth daily. With 400 mg of Calcium in it     order for DME Patient Keven Lopez was set up at [facility] on December 7, 2015. Patient received a Resmed AirSense 10 Auto. Pressures were set at Auto 7 - 12 cm H2O.   Patient s ramp is 5 cm H2O for Auto and FLEX/EPR is 3.  Patient received a [employer] Mask name: Nuance Pro Gel  Pillow mask Size Small Pillows, heated tubing and heated humidifier.  Patient is enrolled in the STM Program and does need to meet compliance. Patient has a follow up on 12/18/2015 with Dr. Lopez.   Sarah Mott     UNABLE TO FIND MEDICATION NAME: Adaptocrine     UNABLE TO FIND MEDICATION NAME: Ilya Ma Wan     UNABLE TO FIND MEDICATION NAME: Macy Westbrook     blood glucose test strip Use to test blood sugar 2 times daily or as directed. (Patient not taking: Reported on 4/17/2018)     Cholecalciferol (VITAMIN D) 1000  "UNIT capsule Take 1 capsule by mouth daily.     No current facility-administered medications for this visit.        ROS: Please see HPI all other systems review and negative.    Physical Examination:  /72 (BP Location: Left arm, Patient Position: Chair, Cuff Size: Adult Regular)  Pulse 70  Ht 1.676 m (5' 6\")  Wt 59.6 kg (131 lb 6.4 oz)  BMI 21.21 kg/m2  General Appearance:  The patient is well groomed and cooperative with examination. She is in no acute distress  Neurological Examination  Cognition: oriented x3, attention and recall intact. No aphasia or dysarthria  Cranial Nerves: 2-12 grossly intact.  General Motor Survey: Normal muscle bulk, tone and strength in all four ext. No tremor  Gait: Normal gait which is stable on turns.          Impression/Recommendations:    1.  Migraine headaches: The patient is straddling the gap between intermittent migraines and chronic migraines.  It does not appear to me that gabapentin has successfully reduced her migraines and I would recommend she try a different migraine preventive.  Topiramate, amitriptyline, venlafaxine or propranolol would all be viable preventive options.  The patient has great concern regarding side effects of these medications and prefers to continue with acupuncture, Chinese herbal medicine and observation.  She will continue to use frovatriptan as needed for the intermittent migraines.  I once again have declined prescribing her marijuana to treat her migraines.    The patient will discuss with her ophthalmologist the safety of the different preventive medications I have recommended and whether any of them would be better than others in the setting of her glaucoma.  I will wait to hear from her.    30 minutes spent with the patient over 50% counseling regarding migraine management and treatment options.    Again, thank you for allowing me to participate in the care of your patient.      Sincerely,    Ashley Arellano MD      "

## 2018-04-17 NOTE — MR AVS SNAPSHOT
After Visit Summary   4/17/2018    Keven Lopez    MRN: [id number]           Patient Information     Date Of Birth          1950        Visit Information        Provider Department      4/17/2018 10:00 AM Ashley Arellano MD [facility] Neurology        Today's Diagnoses     Migraine with aura and without status migrainosus, not intractable    -  1      Care Instructions     Decrease gabapentin to 300 mg twice a day for one week then 300 mg once a day for one week then stop.    Possible migraine preventives:  Topiramate 25 mg at night x 1 wk then 50 mg at night x 1 wk then 75 mg at night  Propranolol 20 mg once a day for one week then twice a day  Amitriptyline 10 mg at night x 1 wk then 20 mg at night 30 mg at night  Venlafaxine 75 mg once a day    Limit Frova use to 8 days a month              Follow-ups after your visit        Follow-up notes from your care team     Return if symptoms worsen or fail to improve.      Your next 10 appointments already scheduled     Jul 02, 2018  1:40 PM CDT   PHYSICAL with Raven Buckley MD   [facility])    [place] [address]   [phone]              Who to contact     Please call your clinic at [phone] to:    Ask questions about your health    Make or cancel appointments    Discuss your medicines    Learn about your test results    Speak to your doctor            Additional Information About Your Visit        MyChart Information     [employer] gives you secure access to your electronic health record. If you see a primary care provider, you can also send messages to your care team and make appointments. If you have questions, please call your primary care clinic.  If you do not have a primary care provider, please call [phone] and they will assist you.      [employer]ank is an electronic gateway that provides easy, online access to your medical  "records. With [employer], you can request a clinic appointment, read your test results, renew a prescription or communicate with your care team.     To access your existing account, please contact your [facility] or call [phone] for assistance.        Care EveryWhere ID     This is your Care EveryWhere ID. This could be used by other organizations to access your [place] medical records  [id number]        Your Vitals Were     Pulse Height BMI (Body Mass Index)             70 1.676 m (5' 6\") 21.21 kg/m2          Blood Pressure from Last 3 Encounters:   04/17/18 110/72   08/01/17 104/68   04/10/17 99/67    Weight from Last 3 Encounters:   04/17/18 59.6 kg (131 lb 6.4 oz)   08/01/17 59.9 kg (132 lb)   05/25/17 61.2 kg (135 lb)              Today, you had the following     No orders found for display         Today's Medication Changes          These changes are accurate as of 4/17/18 10:36 AM.  If you have any questions, ask your nurse or doctor.               Stop taking these medicines if you haven't already. Please contact your care team if you have questions.     GABAPENTIN PO   Stopped by:  Ashley Arellano MD                Where to get your medicines      These medications were sent to [facility] #[id number] - [address] [address]     Phone:  [phone]     frovatriptan 2.5 MG tablet                Primary Care Provider Office Phone # Fax #    Raven Buckley [phone]       [address] [place] [address]        Equal Access to Services     RACHELLE CADENA : Hadvanessa Gipson, adrien bautista, qaybta viral daniel. So [facility] [phone].    ATENCIÓN: Si habla español, tiene a barron disposición servicios gratuitos de asistencia lingüística. Llame al [phone].    We comply with applicable federal civil " rights laws and [place] laws. We do not discriminate on the basis of race, color, national origin, age, disability, sex, sexual orientation, or gender identity.            Thank you!     Thank you for choosing [facility] NEUROLOGY  for your care. Our goal is always to provide you with excellent care. Hearing back from our patients is one way we can continue to improve our services. Please take a few minutes to complete the written survey that you may receive in the mail after your visit with us. Thank you!             Your Updated Medication List - Protect others around you: Learn how to safely use, store and throw away your medicines at www.disposemymeds.org.          This list is accurate as of 4/17/18 10:36 AM.  Always use your most recent med list.                   Brand Name Dispense Instructions for use Diagnosis    ALEVE PO      Take by mouth as needed        blood glucose monitoring test strip    no brand specified    1 Month    Use to test blood sugar 2 times daily or as directed.    Pre-diabetes       brinzolamide 1 % ophthalmic susp    AZOPT     Place 1 drop into both eyes 2 times daily.        CALCIUM CITRATE PO      Take 400 mg by mouth        frovatriptan 2.5 MG tablet    FROVA    6 tablet    Take 1 tablet (2.5 mg) by mouth at onset of headache for migraine    Migraine with aura and without status migrainosus, not intractable       MELATONIN PO      Take 1 mg by mouth        metroNIDAZOLE 0.75 % Lotn      Externally apply  topically daily. Indications: Acne of Unknown cause Usually in Older Age Groups        multivitamin per tablet      Take 1 tablet by mouth daily. With 400 mg of Calcium in it        order for DME      Patient Keven Lopez was set up at [facility] on December 7, 2015. Patient received a Resmed AirSense 10 Auto. Pressures were set at Auto 7 - 12 cm H2O.   Patient?s ramp is 5 cm H2O for Auto and FLEX/EPR is 3.  Patient received a [employer] Mask name: Nuance  Pro Gel  Pillow mask Size Small Pillows, heated tubing and heated humidifier.  Patient is enrolled in the STM Program and does need to meet compliance. Patient has a follow up on 12/18/2015 with Dr. Lopez.  Sarah Mott        SYNTHROID 100 MCG tablet   Generic drug:  levothyroxine     90 tablet    Take 1 tablet (100 mcg) by mouth daily    Hypothyroidism, unspecified type       SYSTANE ULTRA 0.4-0.3 % Soln ophthalmic solution   Generic drug:  polyethylene glycol 0.4%- propylene glycol 0.3%      Place 1 drop into both eyes every hour as needed.        TYLENOL EXTRA STRENGTH PO      Take by mouth as needed        UNABLE TO FIND      MEDICATION NAME: Adaptocrine        UNABLE TO FIND      MEDICATION NAME: Ilya Cason        UNABLE TO FIND      MEDICATION NAME: Macy Westbrook        VAGIFEM 10 MCG Tabs vaginal tablet   Generic drug:  estradiol      Place 10 mcg vaginally. Two times a week        vitamin D 1000 units capsule      Take 1 capsule by mouth daily.

## 2018-04-17 NOTE — PROGRESS NOTES
"    [facility]    Keven Lopez MRN# [id number]   Age: 67 year old YOB: 1950     Requesting physician: Raven Jiménez     Chief Complaint:    Transfer of care for migraine treatment    History of Present Illness:  Keven \"Sami\" Gladys Lopez is a 67-year-old woman who I followed for multiple years at [facility], [facility].  The patient transfers her care to the [facility].  She has been followed for intermittent migraine headaches that unfortunately over the last few years to become more severe and are now occurring more often than not consistent with chronic migraine headaches.    The patient was seen last July 2017 she was having 3-4 migraine days a week she was initiated on gabapentin working up to a dose of 300 mg 3 times a day and for about 6 months she had excellent response with very few migraines.  Since January her migraines and started coming back.  She reports that she is currently having migraine headaches that last anywhere from 1.5 days to 4 days.  They are occurring about 4-5 times a month.  She is currently treating with acupuncture.  She again brings up that she would like treatment with marijuana and in the past has tried marijuana when she was in [facility] and feels that it helps immensely.    In the past the patient has been hesitant to start other medications for migraine prevention because of her glaucoma.  She has recently switched physicians because her ophthalmologist retired.  She is now seeing Dr. Kierra Galvan who apparently has raised less concerns and her prior ophthalmologist about topiramate.  It was an offer for her to have her glaucoma monitored if topiramate was initiated.    The patient also mentions in the past using venlafaxine for hot flash management and wonders if it might have helped migraines.    In the distant past when she was training she tried nortriptyline but does not " remember dose nor effectiveness.    The patient continues to find that frovatriptan is the best triptan to abort the headaches but does notice that sometimes the migraine goes away for a day and then comes back a day later.    Past Medical History:   Diagnosis Date     Glaucoma     recently diagnosed     Hypothyroid      Microscopic hematuria     Work-up negative     Migraines      Prediabetes      Sleep apnea     mild, uses dental device     TMJ (dislocation of temporomandibular joint)        Patient Active Problem List   Diagnosis     Migraine with aura and without status migrainosus, not intractable     Glaucoma     Pre-diabetes     Leg cramps     Stress at work     Muscle twitching     Hypothyroidism     Family history of malignant neoplasm of breast       Past Surgical History:   Procedure Laterality Date     BREAST BIOPSY, RT/LT       COLONOSCOPY  2/27/2012    Procedure:COLONOSCOPY; COLONOSCOPY; Surgeon:JLUIS HUNTER; Location: GI     SEPTOPLASTY, TURBINOPLASTY, COMBINED  5/24/2012    Procedure:COMBINED SEPTOPLASTY, TURBINOPLASTY; Septoplasty, Submuccosal Resection Turbinates; Surgeon:AARON ATKINSON; Location:[facility] OR     urethral stricture dilatation         Social History     Social History     Marital status:      Spouse name: N/A     Number of children: N/A     Years of education: N/A     Occupational History     Not on file.     Social History Main Topics     Smoking status: Never Smoker     Smokeless tobacco: Never Used     Alcohol use No     Drug use: No     Sexual activity: Yes     Partners: Male     Other Topics Concern     Not on file     Social History Narrative       Family History   Problem Relation Age of Onset     DIABETES Father      DIABETES Sister      Breast Cancer Sister 60     Migraines Brother        Current Outpatient Prescriptions   Medication Sig     frovatriptan (FROVA) 2.5 MG tablet Take 1 tablet (2.5 mg) by mouth at onset of headache for migraine     MELATONIN PO  Take 1 mg by mouth     CALCIUM CITRATE PO Take 400 mg by mouth     Naproxen Sodium (ALEVE PO) Take by mouth as needed      Acetaminophen (TYLENOL EXTRA STRENGTH PO) Take by mouth as needed      SYNTHROID 100 MCG tablet Take 1 tablet (100 mcg) by mouth daily     estradiol (VAGIFEM) 10 MCG TABS Place 10 mcg vaginally. Two times a week     metroNIDAZOLE 0.75 % LOTN Externally apply  topically daily. Indications: Acne of Unknown cause Usually in Older Age Groups     brinzolamide (AZOPT) 1 % ophthalmic suspension Place 1 drop into both eyes 2 times daily.     polyethylene glycol - propylene glycol (SYSTANE ULTRA) 0.4-0.3 % SOLN Place 1 drop into both eyes every hour as needed.     Multiple Vitamin (MULTIVITAMIN) per tablet Take 1 tablet by mouth daily. With 400 mg of Calcium in it     order for DME Patient Keven Lopez was set up at [facility] on December 7, 2015. Patient received a Resmed AirSense 10 Auto. Pressures were set at Auto 7 - 12 cm H2O.   Patient s ramp is 5 cm H2O for Auto and FLEX/EPR is 3.  Patient received a [employer] Mask name: Nuance Pro Gel  Pillow mask Size Small Pillows, heated tubing and heated humidifier.  Patient is enrolled in the STM Program and does need to meet compliance. Patient has a follow up on 12/18/2015 with Dr. Lopez.   Sarah Mott     UNABLE TO FIND MEDICATION NAME: Adaptocrine     UNABLE TO FIND MEDICATION NAME: Ilya Ma Wan     UNABLE TO FIND MEDICATION NAME: Macy Westbrook     blood glucose test strip Use to test blood sugar 2 times daily or as directed. (Patient not taking: Reported on 4/17/2018)     Cholecalciferol (VITAMIN D) 1000 UNIT capsule Take 1 capsule by mouth daily.     No current facility-administered medications for this visit.        ROS: Please see HPI all other systems review and negative.    Physical Examination:  /72 (BP Location: Left arm, Patient Position: Chair, Cuff Size: Adult Regular)  Pulse 70  Ht 1.676 m (5'  "6\")  Wt 59.6 kg (131 lb 6.4 oz)  BMI 21.21 kg/m2  General Appearance:  The patient is well groomed and cooperative with examination. She is in no acute distress  Neurological Examination  Cognition: oriented x3, attention and recall intact. No aphasia or dysarthria  Cranial Nerves: 2-12 grossly intact.  General Motor Survey: Normal muscle bulk, tone and strength in all four ext. No tremor  Gait: Normal gait which is stable on turns.          Impression/Recommendations:    1.  Migraine headaches: The patient is straddling the gap between intermittent migraines and chronic migraines.  It does not appear to me that gabapentin has successfully reduced her migraines and I would recommend she try a different migraine preventive.  Topiramate, amitriptyline, venlafaxine or propranolol would all be viable preventive options.  The patient has great concern regarding side effects of these medications and prefers to continue with acupuncture, Chinese herbal medicine and observation.  She will continue to use frovatriptan as needed for the intermittent migraines.  I once again have declined prescribing her marijuana to treat her migraines.    The patient will discuss with her ophthalmologist the safety of the different preventive medications I have recommended and whether any of them would be better than others in the setting of her glaucoma.  I will wait to hear from her.    30 minutes spent with the patient over 50% counseling regarding migraine management and treatment options.    Ashley Arellano MD FAAN  Department of Neurology    Answers for HPI/ROS submitted by the patient on 4/17/2018   General Symptoms: No  Skin Symptoms: No  HENT Symptoms: Yes  EYE SYMPTOMS: Yes  HEART SYMPTOMS: No  LUNG SYMPTOMS: No  INTESTINAL SYMPTOMS: Yes  URINARY SYMPTOMS: No  GYNECOLOGIC SYMPTOMS: Yes  BREAST SYMPTOMS: No  SKELETAL SYMPTOMS: Yes  BLOOD SYMPTOMS: No  NERVOUS SYSTEM SYMPTOMS: Yes  MENTAL HEALTH SYMPTOMS: Yes  Tinnitus: Yes  Dry " eyes: Yes  Watery eyes: Yes  Floaters: Yes  Eye irritation: Yes  Bloating: Yes  Constipation: Yes  Change in stools: Yes  Muscle aches: Yes  Muscle cramps: Yes  Joint stiffness: Yes  Memory loss: Yes  Headache: Yes  Hot flashes: Yes  Vaginal dryness: Yes  Reduced libido: Yes  Difficulty with sexual arousal: Yes  Trouble thinking or concentrating: Yes  Mood changes: Yes

## 2018-05-02 ENCOUNTER — TRANSFERRED RECORDS (OUTPATIENT)
Dept: HEALTH INFORMATION MANAGEMENT | Facility: CLINIC | Age: 68
End: 2018-05-02

## 2018-05-22 LAB
HBA1C MFR BLD: 5.4 % (ref 0–5.7)
HBA1C MFR BLD: NORMAL % (ref 0–5.7)
T3FREE SERPL-MCNC: 2.5 PG/ML
T4 FREE SERPL-MCNC: 1.8 NG/DL
TSH SERPL-ACNC: 0.15 MCU/ML

## 2018-06-12 ENCOUNTER — OFFICE VISIT (OUTPATIENT)
Dept: FAMILY MEDICINE | Facility: CLINIC | Age: 68
End: 2018-06-12
Payer: COMMERCIAL

## 2018-06-12 VITALS
WEIGHT: 131 LBS | OXYGEN SATURATION: 99 % | HEART RATE: 67 BPM | DIASTOLIC BLOOD PRESSURE: 77 MMHG | BODY MASS INDEX: 21.05 KG/M2 | TEMPERATURE: 98.7 F | SYSTOLIC BLOOD PRESSURE: 125 MMHG | HEIGHT: 66 IN

## 2018-06-12 DIAGNOSIS — K59.00 CONSTIPATION, UNSPECIFIED CONSTIPATION TYPE: ICD-10-CM

## 2018-06-12 DIAGNOSIS — E03.9 HYPOTHYROIDISM, UNSPECIFIED TYPE: Primary | ICD-10-CM

## 2018-06-12 LAB
HBA1C MFR BLD: 5.4 % (ref 0–5.7)
HBA1C MFR BLD: 5.4 % (ref 0–5.7)
HBA1C MFR BLD: NORMAL % (ref 0–5.7)

## 2018-06-12 RX ORDER — LEVOTHYROXINE SODIUM 100 MCG
100 TABLET ORAL DAILY
Qty: 90 TABLET | Refills: 3 | Status: CANCELLED | OUTPATIENT
Start: 2018-06-12

## 2018-06-12 RX ORDER — LEVOTHYROXINE SODIUM 88 UG/1
CAPSULE ORAL
Qty: 30 CAPSULE | Refills: 1 | Status: SHIPPED | OUTPATIENT
Start: 2018-06-12 | End: 2018-08-08

## 2018-06-12 ASSESSMENT — PAIN SCALES - GENERAL: PAINLEVEL: NO PAIN (0)

## 2018-06-12 NOTE — PATIENT INSTRUCTIONS
Shingles vaccine  Please call you insurance carrier to see if shingles vaccine is covered.  If so, then ask about where to go to get the vaccine-pharmacy vs clinic.

## 2018-06-12 NOTE — MR AVS SNAPSHOT
After Visit Summary   6/12/2018    Keven Lopez    MRN: [id number]           Patient Information     Date Of Birth          1950        Visit Information        Provider Department      6/12/2018 10:00 AM Raven Buckley MD [facility]        Today's Diagnoses     Hypothyroidism, unspecified type    -  1      Care Instructions    Shingles vaccine  Please call you insurance carrier to see if shingles vaccine is covered.  If so, then ask about where to go to get the vaccine-pharmacy vs clinic.            Follow-ups after your visit        Your next 10 appointments already scheduled     Jul 02, 2018  1:40 PM CDT   PHYSICAL with Raven Buckley MD   [facility] ([facility])    [address] [address]   [phone]              Future tests that were ordered for you today     Open Future Orders        Priority Expected Expires Ordered    TSH with free T4 reflex Routine 7/24/2018 6/12/2019 6/12/2018            Who to contact     Please call your clinic at [phone] to:    Ask questions about your health    Make or cancel appointments    Discuss your medicines    Learn about your test results    Speak to your doctor            Additional Information About Your Visit        [employer] Information     [employer] gives you secure access to your electronic health record. If you see a primary care provider, you can also send messages to your care team and make appointments. If you have questions, please call your primary care clinic.  If you do not have a primary care provider, please call [phone] and they will assist you.      [employer] is an electronic gateway that provides easy, online access to your medical records. With [employer], you can request a clinic appointment, read your test results, renew a prescription or communicate with your care team.     To access your existing account, please contact your  "[facility] or call [phone] for assistance.        Care EveryWhere ID     This is your Care EveryWhere ID. This could be used by other organizations to access your [place] medical records  [id number]        Your Vitals Were     Pulse Temperature Height Pulse Oximetry BMI (Body Mass Index)       67 98.7  F (37.1  C) (Temporal) 5' 5.51\" (166.4 cm) 99% 21.46 kg/m2        Blood Pressure from Last 3 Encounters:   06/12/18 125/77   04/17/18 110/72   08/01/17 104/68    Weight from Last 3 Encounters:   06/12/18 131 lb (59.4 kg)   04/17/18 131 lb 6.4 oz (59.6 kg)   08/01/17 132 lb (59.9 kg)                 Today's Medication Changes          These changes are accurate as of 6/12/18 11:18 AM.  If you have any questions, ask your nurse or doctor.               Start taking these medicines.        Dose/Directions    Levothyroxine Sodium 88 MCG Caps   Used for:  Hypothyroidism, unspecified type   Replaces:  SYNTHROID 100 MCG tablet   Started by:  Raven Buckley MD        Take one daily   Quantity:  30 capsule   Refills:  1         Stop taking these medicines if you haven't already. Please contact your care team if you have questions.     SYNTHROID 100 MCG tablet   Generic drug:  levothyroxine   Replaced by:  Levothyroxine Sodium 88 MCG Caps   Stopped by:  Raven Bcukley MD                Where to get your medicines      These medications were sent to [facility] #[id number] - [address] [address]     Phone:  [phone]     Levothyroxine Sodium 88 MCG Caps                Primary Care Provider Office Phone # Fax #    Raven Buckley [phone] [address] [place] [address]        Equal Access to Services     MORENO CADENA AH: Georgina Gipson, waaxda luqadaha, qaybta kaalmapramod cardoso, viral roper. So [facility] " [phone].    ATENCIÓN: Si shahzad bro, tiene a barron disposición servicios gratuitos de asistencia lingüística. Sammy ortiz [phone].    We comply with applicable federal civil rights laws and [place] laws. We do not discriminate on the basis of race, color, national origin, age, disability, sex, sexual orientation, or gender identity.            Thank you!     Thank you for choosing [facility]  for your care. Our goal is always to provide you with excellent care. Hearing back from our patients is one way we can continue to improve our services. Please take a few minutes to complete the written survey that you may receive in the mail after your visit with us. Thank you!             Your Updated Medication List - Protect others around you: Learn how to safely use, store and throw away your medicines at www.disposemymeds.org.          This list is accurate as of 6/12/18 11:18 AM.  Always use your most recent med list.                   Brand Name Dispense Instructions for use Diagnosis    ALEVE PO      Take by mouth as needed        brinzolamide 1 % ophthalmic susp    AZOPT     Place 1 drop into both eyes 2 times daily.        CALCIUM CITRATE PO      Take 400 mg by mouth        frovatriptan 2.5 MG tablet    FROVA    6 tablet    Take 1 tablet (2.5 mg) by mouth at onset of headache for migraine    Migraine with aura and without status migrainosus, not intractable       Levothyroxine Sodium 88 MCG Caps     30 capsule    Take one daily    Hypothyroidism, unspecified type       MELATONIN PO      Take 1 mg by mouth        metroNIDAZOLE 0.75 % Lotn      Externally apply  topically daily. Indications: Acne of Unknown cause Usually in Older Age Groups        multivitamin per tablet      Take 1 tablet by mouth daily. With 400 mg of Calcium in it        SYSTANE ULTRA 0.4-0.3 % Soln ophthalmic solution   Generic drug:  polyethylene glycol 0.4%- propylene glycol 0.3%      Place 1 drop into both eyes every hour as  needed.        UNABLE TO FIND      MEDICATION NAME: Adaptocrine        UNABLE TO FIND      MEDICATION NAME: Ilya Cason        UNABLE TO FIND      MEDICATION NAME: Macy Westbrook        VAGIFEM 10 MCG Tabs vaginal tablet   Generic drug:  estradiol      Place 10 mcg vaginally. Two times a week        vitamin D 1000 units capsule      Take 1 capsule by mouth daily.

## 2018-06-12 NOTE — PROGRESS NOTES
eKven Lopez is a 68 year old female here for the following issues:       Hypothyroidism, unspecified type  Sami is a 69 yo woman with 30 yr history of hypothyroidism. She recently messaged us 06/07/2018 and explained that she was having symptoms of low energy, anxiety, migraines, and constipation (alternating with loose stools). Her doctor of osteopathy, Marcelo Simmons, recommended she have her thyroid tested. Her blood was drawn at [facility] on 05/21/2018 with the following results:    TSH 0.15   T4 free: 1.8   T3 free: 2.5     She takes Synthroid in the morning after waking up, on an empty stomach and doesn't eat for at least an hour.       Constipation  She has been constipated for a few months. She has tried to increase her fiber (25g/day) and water. Her stools have become more normal but she is still constipated at night. Denies blood in stool. Her last colonoscopy was 02/27/2012.     Anxiety  The other day she woke up in the middle of the night feeling like she was having a panic attack. Last night, she woke up sweating and has been having problems with hot flashes.She has been having a lot of anxiety which she believes is due to current politics.     HCM  Sami had her Pneumovax 23 vaccine on 02/13/2018. She had Prevnar 13 immunization in 2015. She had the first vaccine of Shingrix this winter in [place] on 04/07/2018.     Migraines  In [place] this past winter she went to an acupuncturist for her migraines and began taking medicinal herbs for hot flashes which helped. She took the herbs for a month. She has also tried medical marijuana, which has been helping. She stopped taking Frova, previously she was taking it a few times a week. She hasn't had Botox injections.  She follows with a neurologist at the [facility]    Patient Active Problem List   Diagnosis     Migraine with aura and without status migrainosus, not intractable     Glaucoma     Pre-diabetes     Leg cramps     Stress at  "work     Muscle twitching     Hypothyroidism     Family history of malignant neoplasm of breast       Current Outpatient Prescriptions   Medication Sig Dispense Refill     brinzolamide (AZOPT) 1 % ophthalmic suspension Place 1 drop into both eyes 2 times daily.       CALCIUM CITRATE PO Take 400 mg by mouth       Cholecalciferol (VITAMIN D) 1000 UNIT capsule Take 1 capsule by mouth daily.       estradiol (VAGIFEM) 10 MCG TABS Place 10 mcg vaginally. Two times a week       frovatriptan (FROVA) 2.5 MG tablet Take 1 tablet (2.5 mg) by mouth at onset of headache for migraine 6 tablet 11     Levothyroxine Sodium 88 MCG CAPS Take one daily 30 capsule 1     MELATONIN PO Take 1 mg by mouth       metroNIDAZOLE 0.75 % LOTN Externally apply  topically daily. Indications: Acne of Unknown cause Usually in Older Age Groups       Multiple Vitamin (MULTIVITAMIN) per tablet Take 1 tablet by mouth daily. With 400 mg of Calcium in it       Naproxen Sodium (ALEVE PO) Take by mouth as needed        polyethylene glycol - propylene glycol (SYSTANE ULTRA) 0.4-0.3 % SOLN Place 1 drop into both eyes every hour as needed.       SYNTHROID 100 MCG tablet Take 1 tablet (100 mcg) by mouth daily 90 tablet 3     UNABLE TO FIND MEDICATION NAME: Adaptocrine       UNABLE TO FIND MEDICATION NAME: Ilya Cason       UNABLE TO FIND MEDICATION NAME: Macy Westbrook         No Known Allergies     EXAM  /77 (BP Location: Right arm, Patient Position: Chair, Cuff Size: Adult Regular)  Pulse 67  Temp 98.7  F (37.1  C) (Temporal)  Ht 5' 5.51\" (166.4 cm)  Wt 131 lb (59.4 kg)  SpO2 99%  BMI 21.46 kg/m2  EXAM:GENERAL APPEARANCE:  alert, no distress  EYES:  PERRL, EOMI,   HENT: OP clear, no tongue fasciculations  NECK:  No adenopathy or thyromegaly. NO tenderness  RESP: CTA bilaterally  CV: Reg rate and rhythm, normal S1, S2. No murmers  ABDOMEN: soft, nontender, no HSM or masses.  BS normal  Neuro: DTR symmetric 2/4 in all " extremities    Assessment:  (E03.9) Hypothyroidism, unspecified type  (primary encounter diagnosis)  Comment: outside lab from 5/2018 shows suppressed TSH  Plan: TSH with free T4 reflex, Levothyroxine Sodium         88 MCG CAPS         Adjust dose and come back in 6 weeks for a check-up.        Will reduce dose from 100mcg to 88 mcg daily, recheck in 6 wks.    (K59.00) Constipation, unspecified constipation type  Comment: pellet like stools  Plan: recommend increasing fiber to 30g daily intake. Can try miralax if needed. Will check CBC at check up in July. If she is anemic, will need colonoscopy sooner.        Raven Buckley MD  Internal Medicine/Pediatrics

## 2018-06-12 NOTE — NURSING NOTE
"68 year old  Chief Complaint   Patient presents with     Recheck Medication     Check thyroid and thyroid meds. [email or web address], the lab where she had a blood draw, said it was too low.       Blood pressure 125/77, pulse 67, temperature 98.7  F (37.1  C), temperature source Temporal, height 5' 5.51\" (166.4 cm), weight 131 lb (59.4 kg), SpO2 99 %. Body mass index is 21.46 kg/(m^2).  Patient Active Problem List   Diagnosis     Migraine with aura and without status migrainosus, not intractable     Glaucoma     Pre-diabetes     Leg cramps     Stress at work     Muscle twitching     Hypothyroidism     Family history of malignant neoplasm of breast       Wt Readings from Last 2 Encounters:   06/12/18 131 lb (59.4 kg)   04/17/18 131 lb 6.4 oz (59.6 kg)     BP Readings from Last 3 Encounters:   06/12/18 125/77   04/17/18 110/72   08/01/17 104/68         Current Outpatient Prescriptions   Medication     Acetaminophen (TYLENOL EXTRA STRENGTH PO)     blood glucose test strip     brinzolamide (AZOPT) 1 % ophthalmic suspension     CALCIUM CITRATE PO     Cholecalciferol (VITAMIN D) 1000 UNIT capsule     estradiol (VAGIFEM) 10 MCG TABS     frovatriptan (FROVA) 2.5 MG tablet     MELATONIN PO     metroNIDAZOLE 0.75 % LOTN     Multiple Vitamin (MULTIVITAMIN) per tablet     Naproxen Sodium (ALEVE PO)     order for DME     polyethylene glycol - propylene glycol (SYSTANE ULTRA) 0.4-0.3 % SOLN     SYNTHROID 100 MCG tablet     UNABLE TO FIND     UNABLE TO FIND     UNABLE TO FIND     No current facility-administered medications for this visit.        Social History   Substance Use Topics     Smoking status: Never Smoker     Smokeless tobacco: Never Used     Alcohol use No       Health Maintenance Due   Topic Date Due     FOOT EXAM Q1 YEAR  04/21/1951     MICROALBUMIN Q1 YEAR  04/21/1951     HEPATITIS C SCREENING  04/21/1968     ADVANCE DIRECTIVE PLANNING Q5 YRS  04/21/2005     PNEUMOCOCCAL (2 of 2 - PPSV23) 09/14/2016     EYE EXAM Q1 " YEAR  12/01/2017     A1C Q6 MO  12/09/2017     CREATININE Q1 YEAR  01/05/2018     LIPID MONITORING Q1 YEAR  01/05/2018     FALL RISK ASSESSMENT  04/10/2018       No results found for: DAWN Naranjo MA  June 12, 2018 9:59 AM

## 2018-06-13 ENCOUNTER — ALLIED HEALTH/NURSE VISIT (OUTPATIENT)
Dept: FAMILY MEDICINE | Facility: CLINIC | Age: 68
End: 2018-06-13
Payer: COMMERCIAL

## 2018-06-13 DIAGNOSIS — Z23 NEED FOR SHINGLES VACCINE: Primary | ICD-10-CM

## 2018-06-13 NOTE — MR AVS SNAPSHOT
After Visit Summary   6/13/2018    Keven Lopez    MRN: [id number]           Patient Information     Date Of Birth          1950        Visit Information        Provider Department      6/13/2018 9:45 AM Nurse, [facility]        Today's Diagnoses     Need for shingles vaccine    -  1       Follow-ups after your visit        Your next 10 appointments already scheduled     Jul 02, 2018  1:40 PM CDT   PHYSICAL with Raven Buckley MD   [facility])    [address]  [facility] [address]   [phone]              Future tests that were ordered for you today     Open Future Orders        Priority Expected Expires Ordered    TSH with free T4 reflex Routine 7/24/2018 6/12/2019 6/12/2018            Who to contact     Please call your clinic at [phone] to:    Ask questions about your health    Make or cancel appointments    Discuss your medicines    Learn about your test results    Speak to your doctor            Additional Information About Your Visit        Social [employer] Information     [employer] gives you secure access to your electronic health record. If you see a primary care provider, you can also send messages to your care team and make appointments. If you have questions, please call your primary care clinic.  If you do not have a primary care provider, please call [phone] and they will assist you.      [employer] is an electronic gateway that provides easy, online access to your medical records. With [employer], you can request a clinic appointment, read your test results, renew a prescription or communicate with your care team.     To access your existing account, please contact your [facility] or call [phone] for assistance.        Care EveryWhere ID     This is your Care EveryWhere ID. This could be used by other organizations to access your [facility]  records  [id number]         Blood Pressure from Last 3 Encounters:   06/12/18 125/77   04/17/18 110/72   08/01/17 104/68    Weight from Last 3 Encounters:   06/12/18 131 lb (59.4 kg)   04/17/18 131 lb 6.4 oz (59.6 kg)   08/01/17 132 lb (59.9 kg)              We Performed the Following     VACCINE ADMINISTRATION, INITIAL     ZOSTER VACCINE RECOMBINANT ADJUVANTED IM NJX        Primary Care Provider Office Phone # Fax #    Raven Adilene Buckley [phone] [address] [place] [id number]        Equal Access to Services     [facility] Center[facility] : Hadii melva Gipson, adrien bautista, arabella cardoso, viral de leon . [facility] [phone].    ATENCIÓN: Si habla español, tiene a barron disposición servicios gratuitos de asistencia lingüística. LlMount [facility] [phone].    We comply with applicable federal civil rights laws and [place] laws. We do not discriminate on the basis of race, color, national origin, age, disability, sex, sexual orientation, or gender identity.            Thank you!     Thank you for choosing [facility]  for your care. Our goal is always to provide you with excellent care. Hearing back from our patients is one way we can continue to improve our services. Please take a few minutes to complete the written survey that you may receive in the mail after your visit with us. Thank you!             Your Updated Medication List - Protect others around you: Learn how to safely use, store and throw away your medicines at www.disposemymeds.org.          This list is accurate as of 6/13/18 10:02 AM.  Always use your most recent med list.                   Brand Name Dispense Instructions for use Diagnosis    ALEVE PO      Take by mouth as needed        brinzolamide 1 % ophthalmic susp    AZOPT     Place 1 drop into both eyes 2 times daily.        CALCIUM CITRATE PO      Take 400 mg by mouth        frovatriptan 2.5 MG tablet    FROVA    6  tablet    Take 1 tablet (2.5 mg) by mouth at onset of headache for migraine    Migraine with aura and without status migrainosus, not intractable       Levothyroxine Sodium 88 MCG Caps     30 capsule    Take one daily    Hypothyroidism, unspecified type       MELATONIN PO      Take 1 mg by mouth        metroNIDAZOLE 0.75 % Lotn      Externally apply  topically daily. Indications: Acne of Unknown cause Usually in Older Age Groups        multivitamin per tablet      Take 1 tablet by mouth daily. With 400 mg of Calcium in it        SYSTANE ULTRA 0.4-0.3 % Soln ophthalmic solution   Generic drug:  polyethylene glycol 0.4%- propylene glycol 0.3%      Place 1 drop into both eyes every hour as needed.        UNABLE TO FIND      MEDICATION NAME: [employer]        UNABLE TO FIND      MEDICATION NAME: Ilya Cason        UNABLE TO FIND      MEDICATION NAME: Macy Westbrook        VAGIFEM 10 MCG Tabs vaginal tablet   Generic drug:  estradiol      Place 10 mcg vaginally. Two times a week        vitamin D 1000 units capsule      Take 1 capsule by mouth daily.

## 2018-06-13 NOTE — NURSING NOTE
Keven Lopez comes into clinic today at the request of Dr. Buckley Ordering Provider for #2 Shingrix         This service provided today was under the supervising provider of the day Dr. Buckley, who was available if needed.    Cassie Judd    Screening Questionnaire for Adult Immunization    Are you sick today?   No   Do you have allergies to medications, food, a vaccine component or latex?   No   Have you ever had a serious reaction after receiving a vaccination?   No   Do you have a long-term health problem with heart disease, lung disease, asthma, kidney disease, metabolic disease (e.g. diabetes), anemia, or other blood disorder?   No   Do you have cancer, leukemia, HIV/AIDS, or any other immune system problem?   No   In the past 3 months, have you taken medications that affect  your immune system, such as prednisone, other steroids, or anticancer drugs; drugs for the treatment of rheumatoid arthritis, Crohn s disease, or psoriasis; or have you had radiation treatments?   No   Have you had a seizure, or a brain or other nervous system problem?   No   During the past year, have you received a transfusion of blood or blood     products, or been given immune (gamma) globulin or antiviral drug?   No   For women: Are you pregnant or is there a chance you could become        pregnant during the next month?   No   Have you received any vaccinations in the past 4 weeks?   No     Immunization questionnaire answers were all negative.        Per orders of Dr. Buckley, injection of #2 Shingrix given by Cassie Judd. Patient instructed to remain in clinic for 15 minutes afterwards, and to report any adverse reaction to me immediately.       Screening performed by Cassie Judd on 6/13/2018 at 9:59 AM.

## 2018-06-18 NOTE — PATIENT INSTRUCTIONS
Shingles vaccine--Shingrix  Please call you insurance carrier to see if shingles vaccine is covered.  If so, then ask about where to go to get the vaccine-pharmacy vs clinic.    Preventive Health Recommendations  Female Ages 65 +    Yearly exam:     See your health care provider every year in order to  o Review health changes.   o Discuss preventive care.    o Review your medicines if your doctor has prescribed any.      You no longer need a yearly Pap test unless you've had an abnormal Pap test in the past 10 years. If you have vaginal symptoms, such as bleeding or discharge, be sure to talk with your provider about a Pap test.      Every 1 to 2 years, have a mammogram.  If you are over 69, talk with your health care provider about whether or not you want to continue having screening mammograms.      Every 10 years, have a colonoscopy. Or, have a yearly FIT test (stool test). These exams will check for colon cancer.       Have a cholesterol test every 5 years, or more often if your doctor advises it.       Have a diabetes test (fasting glucose) every three years. If you are at risk for diabetes, you should have this test more often.       At age 65, have a bone density scan (DEXA) to check for osteoporosis (brittle bone disease).    Shots:    Get a flu shot each year.    Get a tetanus shot every 10 years.    Talk to your doctor about your pneumonia vaccines. There are now two you should receive - Pneumovax (PPSV 23) and Prevnar (PCV 13).    Talk to your doctor about the shingles vaccine.    Talk to your doctor about the hepatitis B vaccine.    Nutrition:     Eat at least 5 servings of fruits and vegetables each day.      Eat whole-grain bread, whole-wheat pasta and brown rice instead of white grains and rice.      Talk to your provider about Calcium and Vitamin D.     Lifestyle    Exercise at least 150 minutes a week (30 minutes a day, 5 days a week). This will help you control your weight and prevent  disease.      Limit alcohol to one drink per day.      No smoking.       Wear sunscreen to prevent skin cancer.       See your dentist twice a year for an exam and cleaning.      See your eye doctor every 1 to 2 years to screen for conditions such as glaucoma, macular degeneration, cataracts, etc

## 2018-07-02 ENCOUNTER — OFFICE VISIT (OUTPATIENT)
Dept: FAMILY MEDICINE | Facility: CLINIC | Age: 68
End: 2018-07-02
Payer: COMMERCIAL

## 2018-07-02 VITALS
TEMPERATURE: 99 F | BODY MASS INDEX: 21.29 KG/M2 | WEIGHT: 132.5 LBS | DIASTOLIC BLOOD PRESSURE: 71 MMHG | HEART RATE: 78 BPM | OXYGEN SATURATION: 96 % | HEIGHT: 66 IN | SYSTOLIC BLOOD PRESSURE: 107 MMHG

## 2018-07-02 DIAGNOSIS — Z00.00 ROUTINE GENERAL MEDICAL EXAMINATION AT A HEALTH CARE FACILITY: Primary | ICD-10-CM

## 2018-07-02 DIAGNOSIS — Z11.59 NEED FOR HEPATITIS C SCREENING TEST: ICD-10-CM

## 2018-07-02 DIAGNOSIS — E03.9 HYPOTHYROIDISM, UNSPECIFIED TYPE: ICD-10-CM

## 2018-07-02 DIAGNOSIS — Z22.7 LATENT TUBERCULOSIS INFECTION: ICD-10-CM

## 2018-07-02 DIAGNOSIS — R19.8 CHANGE IN BOWEL FUNCTION: ICD-10-CM

## 2018-07-02 LAB
CHOLEST SERPL-MCNC: 276 MG/DL (ref 0–200)
CHOLEST/HDLC SERPL: 2.8 {RATIO} (ref 0–5)
ERYTHROCYTE [DISTWIDTH] IN BLOOD BY AUTOMATED COUNT: 13.6 % (ref 10–15)
FASTING SPECIMEN: NO
HCT VFR BLD AUTO: 44.3 % (ref 35–47)
HDLC SERPL-MCNC: 99 MG/DL
HGB BLD-MCNC: 14.4 G/DL (ref 11.7–15.7)
LDLC SERPL CALC-MCNC: 152 MG/DL (ref 0–129)
MCH RBC QN AUTO: 31.4 PG (ref 26.5–33)
MCHC RBC AUTO-ENTMCNC: 32.5 G/DL (ref 31.5–36.5)
MCV RBC AUTO: 97 FL (ref 78–100)
PLATELET # BLD AUTO: 275 10E9/L (ref 150–450)
RBC # BLD AUTO: 4.59 10E12/L (ref 3.8–5.2)
TRIGL SERPL-MCNC: 124 MG/DL (ref 0–150)
VLDL-CHOLESTEROL: 25 (ref 7–32)
WBC # BLD AUTO: 7.1 10E9/L (ref 4–11)

## 2018-07-02 RX ORDER — LEVOTHYROXINE SODIUM 88 UG/1
CAPSULE ORAL
Qty: 90 CAPSULE | Refills: 3 | Status: CANCELLED | OUTPATIENT
Start: 2018-07-02

## 2018-07-02 ASSESSMENT — PAIN SCALES - GENERAL: PAINLEVEL: NO PAIN (0)

## 2018-07-02 NOTE — NURSING NOTE
"68 year old  Chief Complaint   Patient presents with     Physical     Pt is not fasting.      Gastrointestinal Problem     Wants to discuss bowel issues. Pt says she has seen \"tissue\" in her stools and thinks it may have something to do with hemorrhoids.        Blood pressure 107/71, pulse 78, temperature 99  F (37.2  C), temperature source Temporal, height 5' 5.51\" (166.4 cm), weight 132 lb 8 oz (60.1 kg), SpO2 96 %. Body mass index is 21.71 kg/(m^2).  Patient Active Problem List   Diagnosis     Migraine with aura and without status migrainosus, not intractable     Glaucoma     Pre-diabetes     Leg cramps     Stress at work     Muscle twitching     Hypothyroidism     Family history of malignant neoplasm of breast       Wt Readings from Last 2 Encounters:   07/02/18 132 lb 8 oz (60.1 kg)   06/12/18 131 lb (59.4 kg)     BP Readings from Last 3 Encounters:   07/02/18 107/71   06/12/18 125/77   04/17/18 110/72         Current Outpatient Prescriptions   Medication     brinzolamide (AZOPT) 1 % ophthalmic suspension     CALCIUM CITRATE PO     Cholecalciferol (VITAMIN D) 1000 UNIT capsule     estradiol (VAGIFEM) 10 MCG TABS     frovatriptan (FROVA) 2.5 MG tablet     Levothyroxine Sodium 88 MCG CAPS     metroNIDAZOLE 0.75 % LOTN     Multiple Vitamin (MULTIVITAMIN) per tablet     Naproxen Sodium (ALEVE PO)     polyethylene glycol - propylene glycol (SYSTANE ULTRA) 0.4-0.3 % SOLN     MELATONIN PO     UNABLE TO FIND     UNABLE TO FIND     UNABLE TO FIND     No current facility-administered medications for this visit.        Social History   Substance Use Topics     Smoking status: Never Smoker     Smokeless tobacco: Never Used     Alcohol use No       Health Maintenance Due   Topic Date Due     FOOT EXAM Q1 YEAR  04/21/1951     MICROALBUMIN Q1 YEAR  04/21/1951     HEPATITIS C SCREENING  04/21/1968     ADVANCE DIRECTIVE PLANNING Q5 YRS  04/21/2005     EYE EXAM Q1 YEAR  12/01/2017     CREATININE Q1 YEAR  01/05/2018     LIPID " MONITORING Q1 YEAR  01/05/2018     FALL RISK ASSESSMENT  04/10/2018     PHQ-2 Q1 YR  04/10/2018       No results found for: DAWN Naranjo MA  July 2, 2018 1:24 PM

## 2018-07-02 NOTE — PROGRESS NOTES
"Keven Lopez is here for a general check up. She is not fasting. She is up to date on eye exams (5/2018, low grade glaucoma) and is dental visits. Wears seat belt-yes. Bike helmet- yes.   Concerns today: Ringing in ears and bowel issues.      HCM  Sami is a 69 yo woman here for a check up . She is generally in good health. Colonoscopy due in 2022, Tdap due 2021. DEXA due 2020. She no longer needs paps. She last saw an ob/gyn 9/2017 and was prescribed Vagifem for atrophic vaginitis. Last mammogram 12/2017.  She has completed her Shingrix vaccine series.     Internal Banded Hemorrhoids  Sami has a history of hemorrhoids. Yesterday she was a bit dehydrated and passed a \"hard lump of stool with a piece of white/pink tissue\".. She had internal banded hemorrhoids in the past. Denies blood. Her last colonoscopy was in 2/2012 that showed diverticulosis sigmoid colon and an otherwise normal result. She has seen fresh blood in her stool 3 times before, most recently 4 years ago. Today she had a normal bowel movement without any abnormal findings. She has [facility]. [facility] at [facility] in the past.     Migraines  Sami takes a CBD/THC 1:1- oral suspension (5 mg per mL) BID for migraines.  She takes 0.5-0.7 mL in AM and 1 mL at night. She will also use THC sublingual spray 1-2 sprays if she is getting a breakthrough headache, 1x week. She hasn't been taking frovatriptan 2.5 mg since she started using the medical cannabis.     Osteoporosis  Lowest T was -2.5 in 2012 and most negative T in 2017 was -2.1 on DEXA. She is followed by Dr[facility] Vann at [facility] and no current Rx for bisphosphonate. Calcium intake: calcium citrate with Vitamin D, 400 mg.  Vitamin D intake-in calcium supplement and she takes a multivitamin. She usually has soy milk, greek yogurt. She does weight bearing exercise. She was told her next DEXA could be in 5 yrs.       Hypothyroidism  Sami has had " hypothyroidism x 30 yrs. She was last seen in May 2018 to evaluate her medication dose. She was taking Synthroid 100 mcg dose daily at that time and complained of some fatigue and constipation. Her TSH was slightly suppressed. I lowered her dose to 88 mcg daily and she will return at the end of July for lab work to recheck. She takes her medication on an empty stomach. Dr. Marcelo Simmons, is still concerned that she is not converting T3 to T4 and is wondering if she should have it retested.     TSH   Date Value Ref Range Status   05/22/2018 0.15 mcU/mL Final     Advance directive: Yes, but not on file with us  Hearing concerns: Yes, has ringing in her ears.  Fall Risk: None  Independent at home: Yes  Safe : Yes  Memory concerns: No  COGNITIVE SCREEN  1) Repeat 3 items (Banana, Sunrise, Chair)      2) Clock draw:   NORMAL  3) 3 item recall:   Recalls 2 objects   Results: NORMAL clock, 1-2 items recalled: COGNITIVE IMPAIRMENT LESS LIKELY    Mini-CogTM Copyright S Raiza. Licensed by the author for use in [facility]; reprinted with permission (kelsi@.[email or web address]). All rights reserved.          Health Maintenance   Topic Date Due     FOOT EXAM Q1 YEAR  04/21/1951     MICROALBUMIN Q1 YEAR  04/21/1951     HEPATITIS C SCREENING  04/21/1968     ADVANCE DIRECTIVE PLANNING Q5 YRS  04/21/2005     EYE EXAM Q1 YEAR  12/01/2017     CREATININE Q1 YEAR  01/05/2018     LIPID MONITORING Q1 YEAR  01/05/2018     FALL RISK ASSESSMENT  04/10/2018     PHQ-2 Q1 YR  04/10/2018     INFLUENZA VACCINE (1) 09/01/2018     A1C Q6 MO  11/22/2018     MAMMO Q1 YR  12/01/2018     TSH W/ FREE T4 REFLEX Q2 YEAR  05/22/2020     DEXA Q3 YR  07/21/2020     TETANUS IMMUNIZATION (SYSTEM ASSIGNED)  11/28/2021     COLONOSCOPY Q10 YR  02/27/2022     PNEUMOCOCCAL  Completed         Patient Active Problem List   Diagnosis     Migraine with aura and without status migrainosus, not intractable     Glaucoma     Pre-diabetes     Leg cramps     Stress at work      Muscle twitching     Hypothyroidism     Family history of malignant neoplasm of breast       Past Surgical History:   Procedure Laterality Date     BREAST BIOPSY, RT/LT       COLONOSCOPY  2/27/2012    Procedure:COLONOSCOPY; COLONOSCOPY; Surgeon:JLUIS HUNTER; Location:[facility]     SEPTOPLASTY, TURBINOPLASTY, COMBINED  5/24/2012    Procedure:COMBINED SEPTOPLASTY, TURBINOPLASTY; Septoplasty, Submuccosal Resection Turbinates; Surgeon:AARON ATKINSON; Location:[facility]     urethral stricture dilatation         Family History   Problem Relation Age of Onset     Diabetes Father      Lymphoma Father      CLL     Bipolar Disorder Mother      Parkinsonism Mother      Diabetes Sister      Breast Cancer Sister 60     Migraines Brother      Parkinsonism Maternal Uncle      x 2 brothers       Social  , no children  Taking piano and voice lessons at [employer].   Retired psychotherapist    HABITS:  Tob: none  ETOH: none  Calcium:MVI + Calcium/D supplement +1-2 per day  Caffeine: none  Exercise: 5 days a week cardio, 3x week weights.     OB/GYN HISTORY:  LMP: Post-menopausal   Hx abnormal pap?  Remote past, most recent in 2017 normal with negative HPV  STD hx?  none  cycle length:  na  dysmenorrhea/PMS:  na  Vasomotor sx:  none  Contraception: na  G 0   P 0   A 0  Self Breast exam:  yes    Current Outpatient Prescriptions   Medication Sig Dispense Refill     brinzolamide (AZOPT) 1 % ophthalmic suspension Place 1 drop into both eyes 2 times daily.       CALCIUM CITRATE PO Take 400 mg by mouth       estradiol (VAGIFEM) 10 MCG TABS Place 10 mcg vaginally. Two times a week       frovatriptan (FROVA) 2.5 MG tablet Take 1 tablet (2.5 mg) by mouth at onset of headache for migraine 6 tablet 11     Levothyroxine Sodium 88 MCG CAPS Take one daily 30 capsule 1     metroNIDAZOLE 0.75 % LOTN Externally apply  topically daily. Indications: Acne of Unknown cause Usually in Older Age Groups       Multiple Vitamin  "(MULTIVITAMIN) per tablet Take 1 tablet by mouth daily. With 400 mg of Calcium in it       Naproxen Sodium (ALEVE PO) Take by mouth as needed        polyethylene glycol - propylene glycol (SYSTANE ULTRA) 0.4-0.3 % SOLN Place 1 drop into both eyes every hour as needed.       No Known Allergies      ROS  Constitutional, HEENT, cardiovascular, pulmonary, GI, , musculoskeletal, neuro, skin, endocrine and psych systems are negative, except as otherwise noted.   Sleep: She has a small amount of sleep apnea and has tried a CPAP but did not tolerate it. She is not concerned about her sleep apnea. She has been sleeping better since she started taking medical cannabis.       EXAM  /71 (BP Location: Right arm, Patient Position: Chair, Cuff Size: Adult Regular)  Pulse 78  Temp 99  F (37.2  C) (Temporal)  Ht 5' 5.51\" (166.4 cm)  Wt 132 lb 8 oz (60.1 kg)  SpO2 96%  BMI 21.71 kg/m2  GENERAL APPEARANCE: Alert, pleasant, NAD  EYES: PERRL, EOMI, conjunctiva clear  HENT: TM normal bilaterally. Nose and mouth without lesions  NECK: no adenopathy, thyroid normal to palpation   RESP: lungs clear to auscultation bilaterally,   CV: regular rate and rhythm, normal S1 S2, no murmur, no carotid bruits  Breast: no masses bilaterally, no axillary adenopathy  ABDOMEN: soft, nontender, without HSM or masses. Bowel sounds normal  MS: extremities normal- no gross deformities noted, no tender, hot or swollen joints.    SKIN: no suspicious lesions or rashes, benign appearing SK on back.   NEURO: Normal strength and tone, sensory exam grossly normal, DTR normoreflexive in upper and lower extremities  PSYCH: mentation appears normal. and affect normal/bright.  EXT: no peripheral edema, pedal pulses palpable  Feet without lesions, normal sensation    Assessment:  (Z00.00) Routine general medical examination at a health care facility  (primary encounter diagnosis)  Comment: healthy 69 yo woman in good health  Plan: Lipid Panel ([facility]), " "Vitamin D Deficiency, CBC        with platelets, Mammogram - routine screening        Anticipatory guidance given today regarding diet, exercise, calcium intake.  She [facility] to [facility] for [facility]ograms. Immunizations are up to date.     (E03.9) Hypothyroidism, unspecified type  Comment: due in one month for recheck of labs  Plan: T4 free, T3 total, TSH        Continue levothyroxine 100mcg daily    (R19.4) Change in bowel function  Comment: recent passage of \"pink tissue\" along with firm stool, no subsequent bleeding. Last colonoscopy 2012   Plan[facility]        Recommend she make appt with colon and rectal surgeon to determine if she needs colonoscopy sooner. Will check CBC today, low threshold to send her early if she is anemic.    Raven Buckley MD  Internal Medicine/Pediatrics    I, Shahrzad Deleon, am serving as a scribe to document services personally performed by Dr. Raven Buckley, based on data collection and the provider's statements to me.       "

## 2018-07-02 NOTE — MR AVS SNAPSHOT
After Visit Summary   7/2/2018    Keven Lopez    MRN: [id number]           Patient Information     Date Of Birth          1950        Visit Information        Provider Department      7/2/2018 1:40 PM Raven Buckley MD [facility]        Today's Diagnoses     Routine general medical examination at a health care facility    -  1    Hypothyroidism, unspecified type        Change in bowel function          Care Instructions    Shingles vaccine--Shingrix  Please call you insurance carrier to see if shingles vaccine is covered.  If so, then ask about where to go to get the vaccine-pharmacy vs clinic.    Preventive Health Recommendations  Female Ages 65 +    Yearly exam:     See your health care provider every year in order to  o Review health changes.   o Discuss preventive care.    o Review your medicines if your doctor has prescribed any.      You no longer need a yearly Pap test unless you've had an abnormal Pap test in the past 10 years. If you have vaginal symptoms, such as bleeding or discharge, be sure to talk with your provider about a Pap test.      Every 1 to 2 years, have a mammogram.  If you are over 69, talk with your health care provider about whether or not you want to continue having screening mammograms.      Every 10 years, have a colonoscopy. Or, have a yearly FIT test (stool test). These exams will check for colon cancer.       Have a cholesterol test every 5 years, or more often if your doctor advises it.       Have a diabetes test (fasting glucose) every three years. If you are at risk for diabetes, you should have this test more often.       At age 65, have a bone density scan (DEXA) to check for osteoporosis (brittle bone disease).    Shots:    Get a flu shot each year.    Get a tetanus shot every 10 years.    Talk to your doctor about your pneumonia vaccines. There are now two you should receive - Pneumovax (PPSV 23) and Prevnar (PCV 13).    Talk to  your doctor about the shingles vaccine.    Talk to your doctor about the hepatitis B vaccine.    Nutrition:     Eat at least 5 servings of fruits and vegetables each day.      Eat whole-grain bread, whole-wheat pasta and brown rice instead of white grains and rice.      Talk to your provider about Calcium and Vitamin D.     Lifestyle    Exercise at least 150 minutes a week (30 minutes a day, 5 days a week). This will help you control your weight and prevent disease.      Limit alcohol to one drink per day.      No smoking.       Wear sunscreen to prevent skin cancer.       See your dentist twice a year for an exam and cleaning.      See your eye doctor every 1 to 2 years to screen for conditions such as glaucoma, macular degeneration, cataracts, etc           Follow-ups after your visit        Additional Services     Colorectal Surgery Referral - [facility]       Your provider has referred you to:     [facility] - [phone]  Dr. Leonid Puckett    Please be aware that coverage of these services is subject to the terms and limitations of your health insurance plan.  Call member services at your health plan with any benefit or coverage questions.      Please bring the following to your appointment:    >>   Any x-rays, CTs or MRIs which have been performed.    >>   List of current medications   >>   This referral request   >>   Any documents/labs given to you for this referral                  Future tests that were ordered for you today     Open Future Orders        Priority Expected Expires Ordered    T4 free Routine 7/18/2018 7/2/2019 7/2/2018    T3 total Routine 7/18/2018 7/2/2019 7/2/2018    TSH Routine 7/18/2018 7/2/2019 7/2/2018            Who to contact     Please call your clinic at [phone] to:    Ask questions about your health    Make or cancel appointments    Discuss your medicines    Learn about your test results    Speak to your doctor            Additional  "Information About Your Visit        [employer] Information     [employer] gives you secure access to your electronic health record. If you see a primary care provider, you can also send messages to your care team and make appointments. If you have questions, please call your primary care clinic.  If you do not have a primary care provider, please call [phone] and they will assist you.      [employer] is an electronic gateway that provides easy, online access to your medical records. With [employer], you can request a clinic appointment, read your test results, renew a prescription or communicate with your care team.     To access your existing account, please contact your [facility] or call [phone] for assistance.        Care EveryWhere ID     This is your Care EveryWhere ID. This could be used by other organizations to access your [facility] medical records  [id number]        Your Vitals Were     Pulse Temperature Height Pulse Oximetry BMI (Body Mass Index)       78 99  F (37.2  C) (Temporal) 5' 5.51\" (166.4 cm) 96% 21.71 kg/m2        Blood Pressure from Last 3 Encounters:   07/02/18 107/71   06/12/18 125/77   04/17/18 110/72    Weight from Last 3 Encounters:   07/02/18 132 lb 8 oz (60.1 kg)   06/12/18 131 lb (59.4 kg)   04/17/18 131 lb 6.4 oz (59.6 kg)              We Performed the Following     CBC with platelets     Colorectal Surgery Referral - [facility]     Lipid Panel ([facility])     Vitamin D Deficiency        Primary Care Provider Office Phone # Fax #    Raven Adilene Buckley [phone]       [address] [place] [address]        Equal Access to Services     RACHELLE [facility] : Hadii melva Gipson, adrien bautista, viral marion. So [facility] [phone].    ATENCIÓN: Si habla español, tiene a barron disposición servicios gratuitos de asistencia lingüística. Llame al " [phone].    We comply with applicable federal civil rights laws and [place] laws. We do not discriminate on the basis of race, color, national origin, age, disability, sex, sexual orientation, or gender identity.            Thank you!     Thank you for choosing [facility]  for your care. Our goal is always to provide you with excellent care. Hearing back from our patients is one way we can continue to improve our services. Please take a few minutes to complete the written survey that you may receive in the mail after your visit with us. Thank you!             Your Updated Medication List - Protect others around you: Learn how to safely use, store and throw away your medicines at www.disposemymeds.org.          This list is accurate as of 7/2/18  2:20 PM.  Always use your most recent med list.                   Brand Name Dispense Instructions for use Diagnosis    ALEVE PO      Take by mouth as needed        brinzolamide 1 % ophthalmic susp    AZOPT     Place 1 drop into both eyes 2 times daily.        CALCIUM CITRATE PO      Take 400 mg by mouth        frovatriptan 2.5 MG tablet    FROVA    6 tablet    Take 1 tablet (2.5 mg) by mouth at onset of headache for migraine    Migraine with aura and without status migrainosus, not intractable       Levothyroxine Sodium 88 MCG Caps     30 capsule    Take one daily    Hypothyroidism, unspecified type       metroNIDAZOLE 0.75 % Lotn      Externally apply  topically daily. Indications: Acne of Unknown cause Usually in Older Age Groups        multivitamin per tablet      Take 1 tablet by mouth daily. With 400 mg of Calcium in it        SYSTANE ULTRA 0.4-0.3 % Soln ophthalmic solution   Generic drug:  polyethylene glycol 0.4%- propylene glycol 0.3%      Place 1 drop into both eyes every hour as needed.        VAGIFEM 10 MCG Tabs vaginal tablet   Generic drug:  estradiol      Place 10 mcg vaginally. Two times a week

## 2018-07-03 LAB
DEPRECATED CALCIDIOL+CALCIFEROL SERPL-MC: 63 UG/L (ref 20–75)
HCV AB SERPL QL IA: NONREACTIVE
HCV AB SERPL QL IA: NORMAL

## 2018-07-24 ENCOUNTER — TRANSFERRED RECORDS (OUTPATIENT)
Dept: HEALTH INFORMATION MANAGEMENT | Facility: CLINIC | Age: 68
End: 2018-07-24

## 2018-08-01 ENCOUNTER — APPOINTMENT (OUTPATIENT)
Age: 68
Setting detail: DERMATOLOGY
End: 2018-08-02

## 2018-08-01 DIAGNOSIS — L71.8 OTHER ROSACEA: ICD-10-CM

## 2018-08-01 DIAGNOSIS — L81.4 OTHER MELANIN HYPERPIGMENTATION: ICD-10-CM

## 2018-08-01 DIAGNOSIS — D18.0 HEMANGIOMA: ICD-10-CM

## 2018-08-01 DIAGNOSIS — L70.8 OTHER ACNE: ICD-10-CM

## 2018-08-01 DIAGNOSIS — L82.1 OTHER SEBORRHEIC KERATOSIS: ICD-10-CM

## 2018-08-01 DIAGNOSIS — D22 MELANOCYTIC NEVI: ICD-10-CM

## 2018-08-01 PROBLEM — D22.5 MELANOCYTIC NEVI OF TRUNK: Status: ACTIVE | Noted: 2018-08-01

## 2018-08-01 PROBLEM — D18.01 HEMANGIOMA OF SKIN AND SUBCUTANEOUS TISSUE: Status: ACTIVE | Noted: 2018-08-01

## 2018-08-01 PROCEDURE — 99214 OFFICE O/P EST MOD 30 MIN: CPT

## 2018-08-01 PROCEDURE — OTHER PRESCRIPTION: OTHER

## 2018-08-01 PROCEDURE — OTHER MIPS QUALITY: OTHER

## 2018-08-01 PROCEDURE — OTHER COUNSELING: OTHER

## 2018-08-01 RX ORDER — METRONIDAZOLE 7.5 MG/G
LOTION TOPICAL QHS
Qty: 59 | Refills: 6 | Status: ERX | COMMUNITY
Start: 2018-08-01

## 2018-08-01 ASSESSMENT — LOCATION ZONE DERM
LOCATION ZONE: FACE
LOCATION ZONE: NOSE
LOCATION ZONE: TRUNK

## 2018-08-01 ASSESSMENT — LOCATION SIMPLE DESCRIPTION DERM
LOCATION SIMPLE: UPPER BACK
LOCATION SIMPLE: RIGHT FOREHEAD
LOCATION SIMPLE: LEFT CHEEK
LOCATION SIMPLE: NOSE
LOCATION SIMPLE: RIGHT UPPER BACK

## 2018-08-01 ASSESSMENT — LOCATION DETAILED DESCRIPTION DERM
LOCATION DETAILED: RIGHT FOREHEAD
LOCATION DETAILED: LEFT INFERIOR CENTRAL MALAR CHEEK
LOCATION DETAILED: RIGHT MEDIAL UPPER BACK
LOCATION DETAILED: SUPERIOR THORACIC SPINE
LOCATION DETAILED: INFERIOR THORACIC SPINE
LOCATION DETAILED: NASAL DORSUM

## 2018-08-01 NOTE — PROCEDURE: MIPS QUALITY
Quality 226: Preventive Care And Screening: Tobacco Use: Screening And Cessation Intervention: Patient screened for tobacco and never smoked
Quality 431: Preventive Care And Screening: Unhealthy Alcohol Use - Screening: Patient screened for unhealthy alcohol use using a single question and scores less than 2 times per year
Detail Level: Detailed
Quality 110: Preventive Care And Screening: Influenza Immunization: Influenza Immunization previously received during influenza season
Quality 130: Documentation Of Current Medications In The Medical Record: Current Medications Documented
Quality 131: Pain Assessment And Follow-Up: Pain assessment using a standardized tool is documented as negative, no follow-up plan required

## 2018-08-07 DIAGNOSIS — E03.9 HYPOTHYROIDISM, UNSPECIFIED TYPE: ICD-10-CM

## 2018-08-07 LAB
T3 SERPL-MCNC: 58 NG/DL (ref 60–181)
T4 FREE SERPL-MCNC: 1.36 NG/DL (ref 0.76–1.46)
TSH SERPL DL<=0.005 MIU/L-ACNC: 0.42 MU/L (ref 0.4–4)

## 2018-08-08 DIAGNOSIS — E03.9 HYPOTHYROIDISM, UNSPECIFIED TYPE: ICD-10-CM

## 2018-08-08 RX ORDER — LEVOTHYROXINE SODIUM 88 UG/1
CAPSULE ORAL
Qty: 90 CAPSULE | Refills: 3 | Status: SHIPPED | OUTPATIENT
Start: 2018-08-08 | End: 2019-07-23

## 2018-08-29 ENCOUNTER — OFFICE VISIT (OUTPATIENT)
Dept: FAMILY MEDICINE | Facility: CLINIC | Age: 68
End: 2018-08-29
Payer: COMMERCIAL

## 2018-08-29 VITALS
HEART RATE: 72 BPM | BODY MASS INDEX: 21.13 KG/M2 | WEIGHT: 131.5 LBS | TEMPERATURE: 97.8 F | HEIGHT: 66 IN | DIASTOLIC BLOOD PRESSURE: 72 MMHG | OXYGEN SATURATION: 99 % | SYSTOLIC BLOOD PRESSURE: 106 MMHG

## 2018-08-29 DIAGNOSIS — G43.109 MIGRAINE WITH AURA AND WITHOUT STATUS MIGRAINOSUS, NOT INTRACTABLE: Primary | ICD-10-CM

## 2018-08-29 DIAGNOSIS — E03.8 OTHER SPECIFIED HYPOTHYROIDISM: ICD-10-CM

## 2018-08-29 RX ORDER — PROPRANOLOL HYDROCHLORIDE 20 MG/1
20 TABLET ORAL 2 TIMES DAILY
Qty: 60 TABLET | Refills: 3 | Status: SHIPPED | OUTPATIENT
Start: 2018-08-29 | End: 2018-09-10

## 2018-08-29 NOTE — PROGRESS NOTES
"Keven Lopez is a 68 year old female with hx of migraines, hypothyroidism, prediabetes and glaucoma. She is here for the following issues:    Migraine  Sami was last here to see in July for an annual exam, her migraines were doing very well taking CBD/THC 1:1- oral suspension (5 mg per mL) BID for migraines. She wasn't using frovatriptan 2.5 mg since starting medical cannabis.     Today she reports, she stopped taking medical cannabis 1 week prior to her colonoscopy, 7/24. She had daily migraine with aura after stopping medical cannabis, she had not had a aura in many months. She resumed taking medical cannabis after her colonoscopy and has been using frovatriptan 2.5 mg once a week since.     Her current medical cannabis dose is 1 ml dose every 8 hours, 1:1 ratio of CBD/THC. When she feels a migraine coming, she will take extra CBD vape. Last night she woke up in the middle of the night and took 5 puffs of CBD (1 puff= 4 mg) and 2 puffs of THC. When she woke up \"her headache is a shadow.\" Dr. Thomas in [place] qualified her for medical cannabis.     Her migraine symptoms include a pounding headache, light sensitivity, and occasional vision changes. She has had vision auras increasing in frequency. Denies nausea and vomiting with migraine. Her migraines are left sided and her ophthalmologist is concerned that it may be causing glaucoma. Her triggers are weather changes and being in the sun/heat for a long time.     Sami follows with Dr. Arellano, neurology. Sami has been hesitant to start other medications for migraine prevention because of her glaucoma. After discussing with her ophthalmologist, Dr. Kierra Galvan, she is willing to begin taking a preventative medication. Dr. Galvan will monitor her glaucoma.       After speaking with a pharmacist and Dr. Galvan, Sami is interested in starting propanolol to help prevent migraines. She will continue taking medical cannabis.    " She reports she has been sleeping well, especially on the cannabis.     But she is traveling soon for 10 days to [place] on September 19th and is wondering if she can travel with medical cannabis. I referred her to the TSA website.      Hypothyroidism  Sami has had hypothyroidism x 30 yrs. She is taking levothyroxine 88 mcg daily. She follows with Dr. Marcelo Simmons. He was concerned she was not converting T3 to T4. Dr. Simmons is wanting to start her on Cytomel. She is asking for my opinion today, as she has not started taking this medication.  She has seen Dr. Vann in the past for endocrine issues. Dr. Simmons is not an endocrinologist.    Results for orders placed or performed in visit on 08/07/18   T4 free   Result Value Ref Range    T4 Free 1.36 0.76 - 1.46 ng/dL   T3 total   Result Value Ref Range    Triiodothyronine (T3) 58 (L) 60 - 181 ng/dL   TSH   Result Value Ref Range    TSH 0.42 0.40 - 4.00 mU/L         Patient Active Problem List   Diagnosis     Migraine with aura and without status migrainosus, not intractable     Glaucoma     Pre-diabetes     Leg cramps     Stress at work     Muscle twitching     Hypothyroidism     Family history of malignant neoplasm of breast       Current Outpatient Prescriptions   Medication Sig Dispense Refill     brinzolamide (AZOPT) 1 % ophthalmic suspension Place 1 drop into both eyes 2 times daily.       CALCIUM CITRATE PO Take 400 mg by mouth       estradiol (VAGIFEM) 10 MCG TABS Place 10 mcg vaginally. Two times a week       frovatriptan (FROVA) 2.5 MG tablet Take 1 tablet (2.5 mg) by mouth at onset of headache for migraine 6 tablet 11     Levothyroxine Sodium 88 MCG CAPS Take one daily 90 capsule 3     medical cannabis (Patient's own supply.  Not a prescription) Take by mouth 2 times daily (This is NOT a prescription, and does not certify that the patient has a qualifying medical condition for medical cannabis.  The purpose of this order is  to document that the patient  "reports taking medical cannabis.)       medical cannabis (Patient's own supply.  Not a prescription) by Other route See Admin Instructions (This is NOT a prescription, and does not certify that the patient has a qualifying medical condition for medical cannabis.  The purpose of this order is  to document that the patient reports taking medical cannabis.)       metroNIDAZOLE 0.75 % LOTN Externally apply  topically daily. Indications: Acne of Unknown cause Usually in Older Age Groups       Multiple Vitamin (MULTIVITAMIN) per tablet Take 1 tablet by mouth daily. With 400 mg of Calcium in it       Naproxen Sodium (ALEVE PO) Take by mouth as needed        polyethylene glycol - propylene glycol (SYSTANE ULTRA) 0.4-0.3 % SOLN Place 1 drop into both eyes every hour as needed.       propranolol (INDERAL) 20 MG tablet Take 1 tablet (20 mg) by mouth 2 times daily 60 tablet 3       No Known Allergies     EXAM  /72  Pulse 72  Temp 97.8  F (36.6  C) (Oral)  Ht 5' 5.51\" (166.4 cm)  Wt 131 lb 8 oz (59.6 kg)  SpO2 99%  BMI 21.54 kg/m2  Gen: Alert, pleasant, NAD      Assessment:  (G43.109) Migraine with aura and without status migrainosus, not intractable  (primary encounter diagnosis)  Comment: persistent migraine symptoms, good candidate for trial of prophylactic medication  Plan: propranolol (INDERAL) 20 MG tablet        rx written, discussed starting with one tablet daily x 1 wk then increase dose if tolerating. Monitor for hypotension. If not helping, after 8-12 wks, then would have her return to her neurologist to discuss alternate options.     (E03.8) Other specified hypothyroidism  Comment: on levothyroxine, TSH is in normal range. T3 just slightly dampened  Plan: defer to Dr. Vann as to her medical opinion as whether to add the cytomel.  She may call for an appt.    Raven Buckley MD  Internal Medicine/Pediatrics    I, Shahrzad Deleon, am serving as a scribe to document services personally performed by Dr." Raven Buckley, based on data collection and the provider's statements to me.

## 2018-08-29 NOTE — NURSING NOTE
"68 year old  Chief Complaint   Patient presents with     Headache     Migraines. pt reports her migranes are getting more frequent lately. July she had 18 days of migranes and they are lasting about 3 days. In May she started the medical cannabis. she had to stop to have a colonscopy done. Pt is going to be traveling so she wants to discuss her next steps.       Blood pressure 106/72, pulse 72, temperature 97.8  F (36.6  C), temperature source Oral, height 5' 5.51\" (166.4 cm), weight 131 lb 8 oz (59.6 kg), SpO2 99 %. Body mass index is 21.54 kg/(m^2).  Patient Active Problem List   Diagnosis     Migraine with aura and without status migrainosus, not intractable     Glaucoma     Pre-diabetes     Leg cramps     Stress at work     Muscle twitching     Hypothyroidism     Family history of malignant neoplasm of breast       Wt Readings from Last 2 Encounters:   08/29/18 131 lb 8 oz (59.6 kg)   07/02/18 132 lb 8 oz (60.1 kg)     BP Readings from Last 3 Encounters:   08/29/18 106/72   07/02/18 107/71   06/12/18 125/77         Current Outpatient Prescriptions   Medication     brinzolamide (AZOPT) 1 % ophthalmic suspension     CALCIUM CITRATE PO     estradiol (VAGIFEM) 10 MCG TABS     frovatriptan (FROVA) 2.5 MG tablet     Levothyroxine Sodium 88 MCG CAPS     medical cannabis (Patient's own supply.  Not a prescription)     medical cannabis (Patient's own supply.  Not a prescription)     metroNIDAZOLE 0.75 % LOTN     Multiple Vitamin (MULTIVITAMIN) per tablet     Naproxen Sodium (ALEVE PO)     polyethylene glycol - propylene glycol (SYSTANE ULTRA) 0.4-0.3 % SOLN     No current facility-administered medications for this visit.        Social History   Substance Use Topics     Smoking status: Never Smoker     Smokeless tobacco: Never Used     Alcohol use No       Health Maintenance Due   Topic Date Due     MICROALBUMIN Q1 YEAR  04/21/1951     ADVANCE DIRECTIVE PLANNING Q5 YRS  04/21/2005     CREATININE Q1 YEAR  01/05/2018 "       No results found for: PAP      August 29, 2018 11:04 AM

## 2018-08-29 NOTE — PATIENT INSTRUCTIONS
Propranolol 20mg dose  Ok to start with one daily to see how it feels  Monitor for lightheadedness, dizziness    May increase to twice daily after a week

## 2018-08-29 NOTE — MR AVS SNAPSHOT
"              After Visit Summary   8/29/2018    Kevne Lopez    MRN: [id number]           Patient Information     Date Of Birth          1950        Visit Information        Provider Department      8/29/2018 11:00 AM Raven Buckley MD [facility]        Today's Diagnoses     Migraine with aura and without status migrainosus, not intractable    -  1      Care Instructions    Propranolol 20mg dose  Ok to start with one daily to see how it feels  Monitor for lightheadedness, dizziness    May increase to twice daily after a week              Follow-ups after your visit        Who to contact     Please call your clinic at [phone] to:    Ask questions about your health    Make or cancel appointments    Discuss your medicines    Learn about your test results    Speak to your doctor            Additional Information About Your Visit        [employer] Information     [employer] gives you secure access to your electronic health record. If you see a primary care provider, you can also send messages to your care team and make appointments. If you have questions, please call your primary care clinic.  If you do not have a primary care provider, please call [phone] and they will assist you.      [employer] is an electronic gateway that provides easy, online access to your medical records. With [employer], you can request a clinic appointment, read your test results, renew a prescription or communicate with your care team.     To access your existing account, please contact your [facility] or call [phone] for assistance.        Care EveryWhere ID     This is your Care EveryWhere ID. This could be used by other organizations to access your [facility] medical records  [id number]        Your Vitals Were     Pulse Temperature Height Pulse Oximetry BMI (Body Mass Index)       72 97.8  F (36.6  C) (Oral) 5' 5.51\" (166.4 cm) 99% 21.54 kg/m2        Blood Pressure from Last " 3 Encounters:   08/29/18 106/72   07/02/18 107/71   06/12/18 125/77    Weight from Last 3 Encounters:   08/29/18 131 lb 8 oz (59.6 kg)   07/02/18 132 lb 8 oz (60.1 kg)   06/12/18 131 lb (59.4 kg)              Today, you had the following     No orders found for display         Today's Medication Changes          These changes are accurate as of 8/29/18 11:33 AM.  If you have any questions, ask your nurse or doctor.               Start taking these medicines.        Dose/Directions    propranolol 20 MG tablet   Commonly known as:  INDERAL   Used for:  Migraine with aura and without status migrainosus, not intractable   Started by:  Raven Buckley MD        Dose:  20 mg   Take 1 tablet (20 mg) by mouth 2 times daily   Quantity:  60 tablet   Refills:  3            Where to get your medicines      These medications were sent to [id number] - [address] [address]     Phone:  [phone]     propranolol 20 MG tablet                Primary Care Provider Office Phone # Fax #    Raven Buckley [phone] [address] [place] [address]        Equal Access to Services     MORENO CADENA : Georgina bowero Soteddy, waaxda luqadaha, qaybta kaalmada adeegyada, viral roper. So [facility] [phone].    ATENCIÓN: Si habla español, tiene a barron disposición servicios gratuitos de asistencia lingüística. Llame al [phone].    We comply with applicable federal civil rights laws and [place] laws. We do not discriminate on the basis of race, color, national origin, age, disability, sex, sexual orientation, or gender identity.            Thank you!     Thank you for choosing [facility]  for your care. Our goal is always to provide you with excellent care. Hearing back from our patients is one way we can continue to improve our services. Please take a few minutes to  complete the written survey that you may receive in the mail after your visit with us. Thank you!             Your Updated Medication List - Protect others around you: Learn how to safely use, store and throw away your medicines at www.disposemymeds.org.          This list is accurate as of 8/29/18 11:33 AM.  Always use your most recent med list.                   Brand Name Dispense Instructions for use Diagnosis    ALEVE PO      Take by mouth as needed        brinzolamide 1 % ophthalmic susp    AZOPT     Place 1 drop into both eyes 2 times daily.        CALCIUM CITRATE PO      Take 400 mg by mouth        frovatriptan 2.5 MG tablet    FROVA    6 tablet    Take 1 tablet (2.5 mg) by mouth at onset of headache for migraine    Migraine with aura and without status migrainosus, not intractable       Levothyroxine Sodium 88 MCG Caps     90 capsule    Take one daily    Hypothyroidism, unspecified type       * medical cannabis (Patient's own supply.  Not a prescription)      Take by mouth 2 times daily (This is NOT a prescription, and does not certify that the patient has a qualifying medical condition for medical cannabis.  The purpose of this order is  to document that the patient reports taking medical cannabis.)        * medical cannabis (Patient's own supply.  Not a prescription)      by Other route See Admin Instructions (This is NOT a prescription, and does not certify that the patient has a qualifying medical condition for medical cannabis.  The purpose of this order is  to document that the patient reports taking medical cannabis.)        metroNIDAZOLE 0.75 % Lotn      Externally apply  topically daily. Indications: Acne of Unknown cause Usually in Older Age Groups        multivitamin per tablet      Take 1 tablet by mouth daily. With 400 mg of Calcium in it        propranolol 20 MG tablet    INDERAL    60 tablet    Take 1 tablet (20 mg) by mouth 2 times daily    Migraine with aura and without status migrainosus,  not intractable       SYSTANE ULTRA 0.4-0.3 % Soln ophthalmic solution   Generic drug:  polyethylene glycol 0.4%- propylene glycol 0.3%      Place 1 drop into both eyes every hour as needed.        VAGIFEM 10 MCG Tabs vaginal tablet   Generic drug:  estradiol      Place 10 mcg vaginally. Two times a week        * Notice:  This list has 2 medication(s) that are the same as other medications prescribed for you. Read the directions carefully, and ask your doctor or other care provider to review them with you.

## 2018-08-30 ENCOUNTER — TRANSFERRED RECORDS (OUTPATIENT)
Dept: HEALTH INFORMATION MANAGEMENT | Facility: CLINIC | Age: 68
End: 2018-08-30

## 2018-09-17 ENCOUNTER — VIRTUAL VISIT (OUTPATIENT)
Dept: ENDOCRINOLOGY | Facility: CLINIC | Age: 68
End: 2018-09-17
Payer: COMMERCIAL

## 2018-09-17 ENCOUNTER — TELEPHONE (OUTPATIENT)
Dept: ENDOCRINOLOGY | Facility: CLINIC | Age: 68
End: 2018-09-17

## 2018-09-17 DIAGNOSIS — E03.8 OTHER SPECIFIED HYPOTHYROIDISM: Primary | ICD-10-CM

## 2018-09-17 NOTE — MR AVS SNAPSHOT
After Visit Summary   9/17/2018    Keven Lopez    MRN: [id number]           Patient Information     Date Of Birth          1950        Visit Information        Provider Department      9/17/2018 4:00 PM Salena Vann MD [facility] Endocrinology        Today's Diagnoses     Other specified hypothyroidism    -  1       Follow-ups after your visit        Who to contact     Please call your clinic at [phone] to:    Ask questions about your health    Make or cancel appointments    Discuss your medicines    Learn about your test results    Speak to your doctor            Additional Information About Your Visit        MyChart Information     [employer] gives you secure access to your electronic health record. If you see a primary care provider, you can also send messages to your care team and make appointments. If you have questions, please call your primary care clinic.  If you do not have a primary care provider, please call [phone] and they will assist you.      [employer] is an electronic gateway that provides easy, online access to your medical records. With [employer], you can request a clinic appointment, read your test results, renew a prescription or communicate with your care team.     To access your existing account, please contact your [facility] or call [phone] for assistance.        Care EveryWhere ID     This is your Care EveryWhere ID. This could be used by other organizations to access your [facility] medical records  [id number]         Blood Pressure from Last 3 Encounters:   08/29/18 106/72   07/02/18 107/71   06/12/18 125/77    Weight from Last 3 Encounters:   08/29/18 59.6 kg (131 lb 8 oz)   07/02/18 60.1 kg (132 lb 8 oz)   06/12/18 59.4 kg (131 lb)              Today, you had the following     No orders found for display       Primary Care Provider Office Phone # Fax #    Raven Buckley [phone]        [address] [place] [address]        Equal Access to Services     MORENO CADENA : Hadii aad ku hadbushraciarra Cherelleteddy, waalbinpramod bautista, derrellsky moralesmapramod cardoso, viral bonillaclovisiggy de leon . So [facility] [phone].    ATENCIÓN: Si habla español, tiene a barron disposición servicios gratuitos de asistencia lingüística. Llame al [phone].    We comply with applicable federal civil rights laws and [place] laws. We do not discriminate on the basis of race, color, national origin, age, disability, sex, sexual orientation, or gender identity.            Thank you!     Thank you for choosing [facility] ENDOCRINOLOGY  for your care. Our goal is always to provide you with excellent care. Hearing back from our patients is one way we can continue to improve our services. Please take a few minutes to complete the written survey that you may receive in the mail after your visit with us. Thank you!             Your Updated Medication List - Protect others around you: Learn how to safely use, store and throw away your medicines at www.disposemymeds.org.          This list is accurate as of 9/17/18 11:59 PM.  Always use your most recent med list.                   Brand Name Dispense Instructions for use Diagnosis    ALEVE PO      Take by mouth as needed        brinzolamide 1 % ophthalmic susp    AZOPT     Place 1 drop into both eyes 2 times daily.        CALCIUM CITRATE PO      Take 400 mg by mouth        frovatriptan 2.5 MG tablet    FROVA    6 tablet    Take 1 tablet (2.5 mg) by mouth at onset of headache for migraine    Migraine with aura and without status migrainosus, not intractable       Levothyroxine Sodium 88 MCG Caps     90 capsule    Take one daily    Hypothyroidism, unspecified type       * medical cannabis (Patient's own supply.  Not a prescription)      Take by mouth 2 times daily (This is NOT a prescription, and does not certify that the patient has a qualifying medical condition for medical  cannabis.  The purpose of this order is  to document that the patient reports taking medical cannabis.)        * medical cannabis (Patient's own supply.  Not a prescription)      by Other route See Admin Instructions (This is NOT a prescription, and does not certify that the patient has a qualifying medical condition for medical cannabis.  The purpose of this order is  to document that the patient reports taking medical cannabis.)        metroNIDAZOLE 0.75 % Lotn      Externally apply  topically daily. Indications: Acne of Unknown cause Usually in Older Age Groups        multivitamin per tablet      Take 1 tablet by mouth daily. With 400 mg of Calcium in it        SYSTANE ULTRA 0.4-0.3 % Soln ophthalmic solution   Generic drug:  polyethylene glycol 0.4%- propylene glycol 0.3%      Place 1 drop into both eyes every hour as needed.        VAGIFEM 10 MCG Tabs vaginal tablet   Generic drug:  estradiol      Place 10 mcg vaginally. Two times a week        * Notice:  This list has 2 medication(s) that are the same as other medications prescribed for you. Read the directions carefully, and ask your doctor or other care provider to review them with you.

## 2018-09-17 NOTE — TELEPHONE ENCOUNTER
Called patient to discuss MyChart message she sent last week:    Kermit Vann: I tried to call the office and waited 20 min without speaking to a person, so decided to try writing.  I have a long history of migraine headaches with occasional visual aura.  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  My ophthalmologist is Dr. Kierra Galvan, referred to me by Dr. Brittney Riley who has retired this past year.  My primary care MD is Jamia Buckley at [facility].  I see Dr. Marcelo Simmons D.O. for issues related to migraine. He alerted me to the potential relationship between migraine and hypothyroidism.  The ophthalmologists have raised the possibility of a connection between the glaucoma and migraine, both of which occur on the left side in my case.  Dr. Simmons had me check thyroid function and found TSH 0.15, T4 free 1.8, T3,free 2.5.  Dr. Buckley decreased the Synthroid from 1.0 to 88 mcg.  Six weeks later TSH is 4.2, T3 58 (), and free T4 1.36 (0.76-1.46) She said I should stay on 88 mcg.  Dr. Simmons suggests adding   Cytomel starting at 5 mcg. as my migraines have been out of control and thinks I may be having trouble converting T4 to T3.  Dr. Buckley suggested I get a second opinion from you.  I have other questions as well concerning medications to prevent migraine and the connection to all this, but await your answer before going into more detail.  Thanks in advance for your attention, Samimarlon [facility]Ari[facility] [phone]. Kermit Vann: I tried to call the office and waited 20min without speaking to a person, so decided to try writing.  I have a long history of migraine headaches with occasional visual aura.  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  My ophthalmologist is Dr. Kierra Galvan, referred to me by Dr. Brittney Riley who has retired this past year.  My primary care MD is Jamia Buckley at [facility].  I see Dr. Marcelo Simmons D.O. for issues related  to migraine. He alerted me to the potential relationship between migraine and hypothyroidism.  The ophthalmologists have raised the possibility of a connection between the glaucoma and migraine, both of which occur on the left side in my case.  Dr. Simmons had me check thyroid function and found TSH 0.15, T4 free 1.8, T3,free 2.5.  Dr. Buckley decreased the Synthroid from 1.0 to 88 mcg.  Six weeks later TSH is 4.2, T3 58 (), and free T4 1.36 (0.76-1.46) She said I should stay on 88 mcg.  Dr. Simmons suggests adding   Cytomel starting at 5 mcg. as my migraines have been out of control and thinks I may be having trouble converting T4 to T3.  Dr. Buckley suggested I get a second opinion from you.  I have other questions as well concerning medications to prevent migraine and the connection to all this, but await your answer before going into more detail.  Thanks in advance for your attention, Sami HaneyLopez [phone].     On phone she explained that she felt well when the T4 dose was changed and did not feel different after it was changed.  She wonders what I think of taking T3 for her migraines.    I told her that I didn't know if the cytomel would improve her migraines, but would ask for good evidence from her neurologist before taking it. Explained that the TSH values show that her pituitary is changing T4 to T3 appropriately. Adding T3 to current dose of T4 would likely cause hyperthyroidism, at least transiently.  Explained that this could increase risk of arrhythmia like atrial fib and bone turnover.    Spent 10 minutes on the phone with her.    Diagnosis :  Hypothyroidism.    Salena Vann MD

## 2018-09-19 NOTE — PROGRESS NOTES
Called patient to discuss MyChart message she sent last week:     Kermit Vann: I tried to call the office and waited 20 min without speaking to a person, so decided to try writing.  I have a long history of migraine headaches with occasional visual aura.  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  My ophthalmologist is Dr. Kierra Galvan, referred to me by Dr. Brittney Riley who has retired this past year.  My primary care MD is Jamia Buckley at [facility].  I see Dr. Marcelo Simmons D.O. for issues related to migraine. He alerted me to the potential relationship between migraine and hypothyroidism.  The ophthalmologists have raised the possibility of a connection between the glaucoma and migraine, both of which occur on the left side in my case.  Dr. Simmons had me check thyroid function and found TSH 0.15, T4 free 1.8, T3,free 2.5.  Dr. Buckley decreased the Synthroid from 1.0 to 88 mcg.  Six weeks later TSH is 4.2, T3 58 (), and free T4 1.36 (0.76-1.46) She said I should stay on 88 mcg.  Dr. Simmons suggests adding   Cytomel starting at 5 mcg. as my migraines have been out of control and thinks I may be having trouble converting T4 to T3.  Dr. Buckley suggested I get a second opinion from you.  I have other questions as well concerning medications to prevent migraine and the connection to all this, but await your answer before going into more detail.  Thanks in advance for your attention, [facility]arlon [facility] [phone]. Kermit Vann: I tried to call the office and waited 20min without speaking to a person, so decided to try writing.  I have a long history of migraine headaches with occasional visual aura.  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  My ophthalmologist is Dr. Kierra Galvan, referred to me by Dr. Brittney Riley who has retired this past year.  My primary care MD is Jamia Buckley at [facility].  I see Dr. Marcelo Simmons D.O. for issues related  to migraine. He alerted me to the potential relationship between migraine and hypothyroidism.  The ophthalmologists have raised the possibility of a connection between the glaucoma and migraine, both of which occur on the left side in my case.  Dr. Simmons had me check thyroid function and found TSH 0.15, T4 free 1.8, T3,free 2.5.  Dr. Buckley decreased the Synthroid from 1.0 to 88 mcg.  Six weeks later TSH is 4.2, T3 58 (), and free T4 1.36 (0.76-1.46) She said I should stay on 88 mcg.  Dr. Simmons suggests adding   Cytomel starting at 5 mcg. as my migraines have been out of control and thinks I may be having trouble converting T4 to T3.  Dr. Buckley suggested I get a second opinion from you.  I have other questions as well concerning medications to prevent migraine and the connection to all this, but await your answer before going into more detail.  Thanks in advance for your attention, Sami HaneyLopez [phone].      On phone she explained that she felt well when the T4 dose was changed and did not feel different after it was changed.  She wonders what I think of taking T3 for her migraines.     I told her that I didn't know if the cytomel would improve her migraines, but would ask for good evidence from her neurologist before taking it. Explained that the TSH values show that her pituitary is changing T4 to T3 appropriately. Adding T3 to current dose of T4 would likely cause hyperthyroidism, at least transiently.  Explained that this could increase risk of arrhythmia like atrial fib and bone turnover.     Spent 10 minutes on the phone with her.     Diagnosis :  Hypothyroidism.     Salena Vann MD

## 2018-12-03 ENCOUNTER — TRANSFERRED RECORDS (OUTPATIENT)
Dept: HEALTH INFORMATION MANAGEMENT | Facility: CLINIC | Age: 68
End: 2018-12-03

## 2019-04-08 ENCOUNTER — TRANSFERRED RECORDS (OUTPATIENT)
Dept: HEALTH INFORMATION MANAGEMENT | Facility: CLINIC | Age: 69
End: 2019-04-08

## 2019-04-09 ENCOUNTER — MEDICAL CORRESPONDENCE (OUTPATIENT)
Dept: FAMILY MEDICINE | Facility: CLINIC | Age: 69
End: 2019-04-09

## 2019-04-30 ENCOUNTER — OFFICE VISIT (OUTPATIENT)
Dept: FAMILY MEDICINE | Facility: CLINIC | Age: 69
End: 2019-04-30
Payer: MEDICARE

## 2019-04-30 VITALS
BODY MASS INDEX: 21.69 KG/M2 | OXYGEN SATURATION: 98 % | HEART RATE: 70 BPM | HEIGHT: 66 IN | TEMPERATURE: 97.7 F | DIASTOLIC BLOOD PRESSURE: 82 MMHG | SYSTOLIC BLOOD PRESSURE: 123 MMHG | WEIGHT: 135 LBS

## 2019-04-30 DIAGNOSIS — L03.012 CELLULITIS OF FINGER OF LEFT HAND: Primary | ICD-10-CM

## 2019-04-30 RX ORDER — SULFAMETHOXAZOLE/TRIMETHOPRIM 800-160 MG
1 TABLET ORAL 2 TIMES DAILY
Qty: 10 TABLET | Refills: 0 | Status: SHIPPED | OUTPATIENT
Start: 2019-04-30 | End: 2019-10-21

## 2019-04-30 ASSESSMENT — MIFFLIN-ST. JEOR: SCORE: 1146.36

## 2019-04-30 NOTE — NURSING NOTE
"69 year old  Chief Complaint   Patient presents with     Derm Problem     pt has a scrape on her thumb that she thinks might be infected. Bump happned a few weeks ago was getting better and then yesterday he noticed that it wasnt.       Blood pressure 123/82, pulse 70, temperature 97.7  F (36.5  C), temperature source Oral, height 1.664 m (5' 5.51\"), weight 61.2 kg (135 lb), SpO2 98 %. Body mass index is 22.12 kg/m .  Patient Active Problem List   Diagnosis     Migraine with aura and without status migrainosus, not intractable     Glaucoma     Pre-diabetes     Leg cramps     Stress at work     Muscle twitching     Hypothyroidism     Family history of malignant neoplasm of breast       Wt Readings from Last 2 Encounters:   04/30/19 61.2 kg (135 lb)   08/29/18 59.6 kg (131 lb 8 oz)     BP Readings from Last 3 Encounters:   04/30/19 123/82   08/29/18 106/72   07/02/18 107/71         Current Outpatient Medications   Medication     brinzolamide (AZOPT) 1 % ophthalmic suspension     CALCIUM CITRATE PO     estradiol (VAGIFEM) 10 MCG TABS     frovatriptan (FROVA) 2.5 MG tablet     Levothyroxine Sodium 88 MCG CAPS     medical cannabis (Patient's own supply.  Not a prescription)     medical cannabis (Patient's own supply.  Not a prescription)     metroNIDAZOLE 0.75 % LOTN     Multiple Vitamin (MULTIVITAMIN) per tablet     Naproxen Sodium (ALEVE PO)     polyethylene glycol - propylene glycol (SYSTANE ULTRA) 0.4-0.3 % SOLN     No current facility-administered medications for this visit.        Social History     Tobacco Use     Smoking status: Never Smoker     Smokeless tobacco: Never Used   Substance Use Topics     Alcohol use: No     Drug use: No       Health Maintenance Due   Topic Date Due     MICROALBUMIN Q1 YEAR  04/21/1951     ADVANCE DIRECTIVE PLANNING Q5 YRS  04/21/2005     CREATININE Q1 YEAR  01/05/2018     INFLUENZA VACCINE (1) 09/01/2018     A1C Q6 MO  11/22/2018     PHQ-2  01/01/2019       No results found for: " PAP      April 30, 2019 11:37 AM

## 2019-04-30 NOTE — PROGRESS NOTES
Keven Lopez is a 69 year old female here for the following issues:    Abrasion  Sami is a 68 yo healthy woman who reports she scraped the base of her left thumb about 3 weeks ago. She does not recall what she scraped the skin against. Last week, the area around the abrasion became red, and she saw some pus as well. No drainage. The area is more raised and swollen now. The abrasion is not very painful, but is bothersome. No numbness in her hand. No fever. Her tetanus vaccine is up to date.      Patient Active Problem List   Diagnosis     Migraine with aura and without status migrainosus, not intractable     Glaucoma     Pre-diabetes     Leg cramps     Stress at work     Muscle twitching     Hypothyroidism     Family history of malignant neoplasm of breast       Current Outpatient Medications   Medication Sig Dispense Refill     brinzolamide (AZOPT) 1 % ophthalmic suspension Place 1 drop into both eyes 2 times daily.       CALCIUM CITRATE PO Take 400 mg by mouth       estradiol (VAGIFEM) 10 MCG TABS Place 10 mcg vaginally. Two times a week       frovatriptan (FROVA) 2.5 MG tablet Take 1 tablet (2.5 mg) by mouth at onset of headache for migraine 6 tablet 11     Levothyroxine Sodium 88 MCG CAPS Take one daily 90 capsule 3     medical cannabis (Patient's own supply.  Not a prescription) Take by mouth 2 times daily (This is NOT a prescription, and does not certify that the patient has a qualifying medical condition for medical cannabis.  The purpose of this order is  to document that the patient reports taking medical cannabis.)       medical cannabis (Patient's own supply.  Not a prescription) by Other route See Admin Instructions (This is NOT a prescription, and does not certify that the patient has a qualifying medical condition for medical cannabis.  The purpose of this order is  to document that the patient reports taking medical cannabis.)       metroNIDAZOLE 0.75 % LOTN Externally apply  topically  "daily. Indications: Acne of Unknown cause Usually in Older Age Groups       Multiple Vitamin (MULTIVITAMIN) per tablet Take 1 tablet by mouth daily. With 400 mg of Calcium in it       Naproxen Sodium (ALEVE PO) Take by mouth as needed        polyethylene glycol - propylene glycol (SYSTANE ULTRA) 0.4-0.3 % SOLN Place 1 drop into both eyes every hour as needed.       sulfamethoxazole-trimethoprim (BACTRIM DS/SEPTRA DS) 800-160 MG tablet Take 1 tablet by mouth 2 times daily for 5 days 10 tablet 0       No Known Allergies     EXAM  /82   Pulse 70   Temp 97.7  F (36.5  C) (Oral)   Ht 1.664 m (5' 5.51\")   Wt 61.2 kg (135 lb)   SpO2 98%   BMI 22.12 kg/m    Gen: Alert, pleasant, NAD  Skin: 7mm lesion, an erythematous papule, no fluctuance, with surrounding edema and some tenderness, on the dorsum of the hand, at base of left thumb, no streaking    Assessment:  (L03.012) Cellulitis of finger of left hand  (primary encounter diagnosis)  Comment: mild cellulitis, could be FB with granulation tissue surrounding it  Plan: sulfamethoxazole-trimethoprim (BACTRIM         DS/SEPTRA DS) 800-160 MG tablet        Elected not to try to zora as lesion had more induration than fluctuance.   Recommend probiotic. Healing may take weeks to flatten or a scar may remain.      Raven Buckley MD  Internal Medicine    I, Savannah Garrett, am serving as a scribe to document services personally performed by Dr. Raven Buckley, based on data collection and the provider's statements to me. Dr. Buckley has reviewed, edited, and approved the above note.     "

## 2019-07-16 DIAGNOSIS — G43.109 MIGRAINE WITH AURA AND WITHOUT STATUS MIGRAINOSUS, NOT INTRACTABLE: ICD-10-CM

## 2019-07-16 RX ORDER — FROVATRIPTAN SUCCINATE 2.5 MG/1
2.5 TABLET, FILM COATED ORAL
Qty: 6 TABLET | Refills: 11 | Status: SHIPPED | OUTPATIENT
Start: 2019-07-16 | End: 2020-04-27

## 2019-07-16 NOTE — TELEPHONE ENCOUNTER
Last Written Prescription Date: 4/17/18  Last Fill Quantity: 6 tbas  # refills: 11   Last office visit:4/17/19  Future Office Visit:  F/u 1 year

## 2019-07-22 ENCOUNTER — TELEPHONE (OUTPATIENT)
Dept: NEUROLOGY | Facility: CLINIC | Age: 69
End: 2019-07-22

## 2019-07-22 NOTE — TELEPHONE ENCOUNTER
Prior Authorization Retail Medication Request    Medication/Dose: Frova 2.5 MG TABLET  ICD code :  G43.109  Previously Tried and Failed: gabapentin    Rationale:     Insurance Name: 950[employer] Ph: [phone]   Insurance ID: [id number]       Pharmacy Information   Name: [facility] [address] [place] [address]  Phone: [phone]

## 2019-07-22 NOTE — TELEPHONE ENCOUNTER
Health Call Center    Phone Message    May a detailed message be left on voicemail: yes    Reason for Call: Medication Question or concern regarding medication   Prescription Clarification  Name of Medication: frovatriptan (FROVA) 2.5 MG tablet     Prescribing Provider: Dr. Arellano   Pharmacy: [facility]   What on the order needs clarification? Prior Auth needed. Please call Sami when initiated/completed.          Action Taken: Message routed to:  [facility] ([facility] neurology

## 2019-07-23 DIAGNOSIS — E03.9 HYPOTHYROIDISM, UNSPECIFIED TYPE: ICD-10-CM

## 2019-07-23 RX ORDER — LEVOTHYROXINE SODIUM 88 UG/1
CAPSULE ORAL
Qty: 90 CAPSULE | Refills: 0 | Status: SHIPPED | OUTPATIENT
Start: 2019-07-23 | End: 2019-08-21

## 2019-07-23 NOTE — TELEPHONE ENCOUNTER
Last visit 4/30/19, future visit 8/21/19    Medication is being filled for 1 time refill only due to:  Patient needs labs TSH. Future labs ordered TSH. Patient needs to be seen because it has been more than one year since last visit.   Adelina Olea RN  [facility]

## 2019-07-29 NOTE — TELEPHONE ENCOUNTER
Prior Authorization Approval - via phone     Authorization Effective Date: 7/29/2019  Authorization Expiration Date: 7/28/2020  Medication: frovatriptan (FROVA) 2.5 MG tablet - P/A APPROVED  Approved Dose/Quantity: 6  Reference #: [id number]   Insurance Company: [employer] - Phone [phone] Fax [phone]  Expected CoPay: $65.63  CoPay Card Available:      Foundation Assistance Needed:    Which Pharmacy is filling the prescription (Not needed for infusion/clinic administered): [id number] - [place], [place] - [address] AT [facility] OF [address] [place] [address]  Pharmacy Notified: Yes - spoke to [facility], they need to order medication in for tomorrow  Patient Notified:  Yes - via voicemail

## 2019-07-29 NOTE — TELEPHONE ENCOUNTER
Patient called asking the status of the P/A request. I apologized that we hadn't worked on it yet, but that I would do it right away. Patient is traveling to [place] next week and wants to make sure she has enough during travel.

## 2019-07-29 NOTE — TELEPHONE ENCOUNTER
Central Prior Authorization Team   Phone: [phone]    Tried doing P/A over the phone ([employer] is unable to do EPA), but their system is down for approx 2 hours. I will call back.

## 2019-08-15 NOTE — PATIENT INSTRUCTIONS
Patient Education   Personalized Prevention Plan  You are due for the preventive services outlined below.  Your care team is available to assist you in scheduling these services.  If you have already completed any of these items, please share that information with your care team to update in your medical record.  Health Maintenance Due   Topic Date Due     Discuss Advance Care Planning  1950     Annual Wellness Visit  07/02/2019     FALL RISK ASSESSMENT  07/02/2019

## 2019-08-21 ENCOUNTER — OFFICE VISIT (OUTPATIENT)
Dept: FAMILY MEDICINE | Facility: CLINIC | Age: 69
End: 2019-08-21
Payer: MEDICARE

## 2019-08-21 ENCOUNTER — TELEPHONE (OUTPATIENT)
Dept: FAMILY MEDICINE | Facility: CLINIC | Age: 69
End: 2019-08-21

## 2019-08-21 VITALS
TEMPERATURE: 97.8 F | BODY MASS INDEX: 22.16 KG/M2 | WEIGHT: 133 LBS | HEART RATE: 62 BPM | OXYGEN SATURATION: 97 % | SYSTOLIC BLOOD PRESSURE: 119 MMHG | DIASTOLIC BLOOD PRESSURE: 79 MMHG | HEIGHT: 65 IN

## 2019-08-21 DIAGNOSIS — N95.2 ATROPHIC VAGINITIS: ICD-10-CM

## 2019-08-21 DIAGNOSIS — M85.80 OSTEOPENIA, UNSPECIFIED LOCATION: ICD-10-CM

## 2019-08-21 DIAGNOSIS — Z12.31 ENCOUNTER FOR SCREENING MAMMOGRAM FOR BREAST CANCER: ICD-10-CM

## 2019-08-21 DIAGNOSIS — E03.9 HYPOTHYROIDISM, UNSPECIFIED TYPE: ICD-10-CM

## 2019-08-21 DIAGNOSIS — Z00.00 ENCOUNTER FOR MEDICARE ANNUAL WELLNESS EXAM: Primary | ICD-10-CM

## 2019-08-21 DIAGNOSIS — Z13.220 SCREENING FOR LIPID DISORDERS: ICD-10-CM

## 2019-08-21 DIAGNOSIS — E03.9 HYPOTHYROIDISM, UNSPECIFIED TYPE: Primary | ICD-10-CM

## 2019-08-21 LAB
ALBUMIN SERPL-MCNC: 3.7 G/DL (ref 3.2–4.5)
ALP SERPL-CCNC: 58 U/L (ref 40–150)
ALT SERPL-CCNC: 20 U/L (ref 0–50)
AST SERPL-CCNC: 22 U/L (ref 0–45)
BILIRUB SERPL-MCNC: 1.6 MG/DL (ref 0.2–1.3)
BUN SERPL-MCNC: 17 MG/DL (ref 7–30)
CALCIUM SERPL-MCNC: 9.7 MG/DL (ref 8.5–10.4)
CHLORIDE SERPLBLD-SCNC: 107 MMOL/L (ref 94–109)
CHOLEST SERPL-MCNC: 235 MG/DL
CO2 SERPL-SCNC: 29 MMOL/L (ref 20–32)
CREAT SERPL-MCNC: 0.8 MG/DL (ref 0.6–1.3)
DEPRECATED CALCIDIOL+CALCIFEROL SERPL-MC: 44 UG/L (ref 20–75)
EGFR CALCULATED (BLACK REFERENCE): 91.5
EGFR CALCULATED (NON BLACK REFERENCE): 75.6
GLUCOSE SERPL-MCNC: 104 MG/DL (ref 60–99)
HBA1C MFR BLD: 5.4 % (ref 4.1–5.7)
HDLC SERPL-MCNC: 95 MG/DL
LDLC SERPL CALC-MCNC: 130 MG/DL
NONHDLC SERPL-MCNC: 140 MG/DL
POTASSIUM SERPL-SCNC: 4 MMOL/L (ref 3.4–5.3)
PROT SERPL-MCNC: 7.1 G/DL (ref 6.8–8.8)
SODIUM SERPL-SCNC: 145 MMOL/L (ref 137.3–146.3)
TRIGL SERPL-MCNC: 49 MG/DL
TSH SERPL DL<=0.005 MIU/L-ACNC: 1.2 MU/L (ref 0.4–4)

## 2019-08-21 RX ORDER — LEVOTHYROXINE SODIUM 88 UG/1
88 CAPSULE ORAL DAILY
Qty: 90 CAPSULE | Refills: 3 | Status: SHIPPED | OUTPATIENT
Start: 2019-08-21 | End: 2019-08-22

## 2019-08-21 RX ORDER — ESTRADIOL 10 UG/1
10 INSERT VAGINAL
Qty: 24 TABLET | Refills: 3 | Status: SHIPPED | OUTPATIENT
Start: 2019-08-22 | End: 2020-09-18

## 2019-08-21 ASSESSMENT — MIFFLIN-ST. JEOR: SCORE: 1134.77

## 2019-08-21 NOTE — TELEPHONE ENCOUNTER
[facility] Call Center    Phone Message    May a detailed message be left on voicemail: no    Reason for Call: Medication Question or concern regarding medication   Prescription Clarification  Name of Medication: levothyroxine (TIROSINT) 88 MCG capsule  Prescribing Provider: Dr. Buckley   Pharmacy: [facility]   What on the order needs clarification? Pt usually gets synthroid and this new medication is unknown to the Pt; she wants to clarify this is what Dr. Buckley wants her to take, and to explain why the change. Please call Pt back.          Action Taken: Message routed to:  [facility]: [facility] NURSE POOL

## 2019-08-21 NOTE — TELEPHONE ENCOUNTER
Patient reports she takes Synthroid brand tablets, and requests to stay on this name brand. She just picked up a 90-day supply so she is not in need of this, will wait until TSH lab is complete.     Lesa Nayak RN  08/21/19  9:37 AM

## 2019-08-21 NOTE — PROGRESS NOTES
Keven Lopez is here for a general check up. She is fasting. She is up to date on eye exams (glasses, glaucoma) and dental visits. Wears seat belt-yes. Bike helmet- yes.   Concerns today:    HCM  Sami is generally healthy.  Her last mammogram was completed 12/3/2018. She spends saleem in [place] and will go for mammogram when she is there.   She is up to date on DEXA, next due 7/21/2020.   Up to date on colonoscopy, next due 7/24/2028.  She is up to date on vaccines, she has completed her Shingrix vaccine series.  Discussed need for flu vaccine as needed.  She is requesting a hemoglobin a1c check, as she has not checked this recently.  Her weight has been stable.    Wt Readings from Last 2 Encounters:   08/21/19 60.3 kg (133 lb)   04/30/19 61.2 kg (135 lb)     Osteopenia  Sami has a hx of osteopenia.  She had a DEXA scan completed on 7/21/2017.  She did not meet criteria for medication. FRAX scores were low. She has been taking a calcium and Vitamin D supplement.  She is requesting a Vitamin D check.     Migraines  Sami has a hx of migraines with aura.  She follows with Dr. 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  She has been using CBD/THC to alleviate migraines.  She has found that CBD with a low dose of THC has been most successful in alleviating sx.  She very rarely needs to use her Frova since she started using CBD.  She has concerns that CBD could cause liver damage, and is requesting a liver function test today.    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  They believe her low pressure glacoma may be related to her hx of migraines.    Hypothyroidism  Sami has had hypothyroidism for x 30 years.  She follows with Dr. Vann.  She is currently taking levotyroxine 88 mcg daily.  She is compliant with her medication, and takes it an hour before she eats.  She is due for labs today.    TSH   Date Value Ref Range  Status   08/07/2018 0.42 0.40 - 4.00 mU/L Final     GERD  Sami had an episode of GERD, where she ate late at night and woke up in the middle of the night with a burning in her throat and difficulty breathing.  She was unable to talk.  She eventually was able to get her breath back after she sat up.  She normally does not get heartburn, and has been eating earlier before bed to prevent similar episodes from occurring.      Advance directive: None on file; she reports she has one  Hearing concerns: No concerns  Fall Risk: No  Independent at home: Yes  Safe : Yes  Memory concerns: No major concerns.    COGNITIVE SCREEN  1) Repeat 3 items (Bicycle, Hat, [place])  2) Clock draw: NORMAL  3) 3 item recall: Recalls 3 objects  Results: 3 items recalled: COGNITIVE IMPAIRMENT LESS LIKELY    Mini-CogTM Copyright S Raiza. Licensed by the author for use in [facility] [employer]; reprinted with permission (kelsi@.[email or web address]). All rights reserved.        Health Maintenance   Topic Date Due     ADVANCE CARE PLANNING  1950     FALL RISK ASSESSMENT  07/02/2019     INFLUENZA VACCINE (1) 09/01/2019     MAMMO SCREENING  12/03/2019     DEXA  07/21/2020     MEDICARE ANNUAL WELLNESS VISIT  08/21/2020     DTAP/TDAP/TD IMMUNIZATION (2 - Td) 11/28/2021     COLONOSCOPY  07/24/2028     HEPATITIS C SCREENING  Completed     PHQ-2  Completed     PNEUMOCOCCAL IMMUNIZATION 65+ LOW/MEDIUM RISK  Completed     ZOSTER IMMUNIZATION  Completed     IPV IMMUNIZATION  Aged Out     MENINGITIS IMMUNIZATION  Aged Out         Patient Active Problem List   Diagnosis     Migraine with aura and without status migrainosus, not intractable     Glaucoma     Pre-diabetes     Leg cramps     Stress at work     Muscle twitching     Hypothyroidism     Family history of malignant neoplasm of breast       Past Surgical History:   Procedure Laterality Date     BREAST BIOPSY, RT/LT       COLONOSCOPY  2/27/2012    Procedure:COLONOSCOPY; COLONOSCOPY; Surgeon:JLUIS HUNTER  JIM; Location: GI     SEPTOPLASTY, TURBINOPLASTY, COMBINED  5/24/2012    Procedure:COMBINED SEPTOPLASTY, TURBINOPLASTY; Septoplasty, Submuccosal Resection Turbinates; Surgeon:AARON ATKINSON; Location:[facility] OR     urethral stricture dilatation         Family History   Problem Relation Age of Onset     Diabetes Father      Lymphoma Father         CLL     Hypertension Father      Hyperlipidemia Father      Bipolar Disorder Mother      Parkinsonism Mother      Asthma Mother      Pulmonary Embolism Mother      Diabetes Paternal Grandmother      Diabetes Sister      Breast Cancer Sister 60     Migraines Brother      Diabetes Brother      Parkinsonism Maternal Uncle         x 2 brothers       Social  , no children  Taking piano and voice lessons at [employer].   Retired psychotherapist     HABITS:  Tob: none  ETOH: none  Calcium: Taking 400 Calcium/ 1000 Vitamin D supplement; soy milk, yogurt, cheese, leafy greens in her diet.  Caffeine: none  Exercise: 5 days a week cardio, 3x week weights.      OB/GYN HISTORY:  LMP: Post-menopausal   Hx abnormal pap?  Remote past, most recent in 2017 normal with negative HPV  STD hx?  none  cycle length:  na  dysmenorrhea/PMS:  na  Vasomotor sx:  none  Contraception: na  G 0   P 0   A 0  Self Breast exam:  yes       Current Outpatient Medications   Medication Sig Dispense Refill     brinzolamide (AZOPT) 1 % ophthalmic suspension Place 1 drop into both eyes 2 times daily.       CALCIUM CITRATE PO Take 400 mg by mouth       estradiol (VAGIFEM) 10 MCG TABS Place 10 mcg vaginally. Two times a week       frovatriptan (FROVA) 2.5 MG tablet Take 1 tablet (2.5 mg) by mouth at onset of headache for migraine 6 tablet 11     levothyroxine (TIROSINT) 88 MCG capsule Take one daily - needs visit and labs 90 capsule 0     medical cannabis (Patient's own supply.  Not a prescription) Take by mouth 2 times daily (This is NOT a prescription, and does not certify that the patient  "has a qualifying medical condition for medical cannabis.  The purpose of this order is  to document that the patient reports taking medical cannabis.)       medical cannabis (Patient's own supply.  Not a prescription) by Other route See Admin Instructions (This is NOT a prescription, and does not certify that the patient has a qualifying medical condition for medical cannabis.  The purpose of this order is  to document that the patient reports taking medical cannabis.)       metroNIDAZOLE 0.75 % LOTN Externally apply  topically daily. Indications: Acne of Unknown cause Usually in Older Age Groups       Multiple Vitamin (MULTIVITAMIN) per tablet Take 1 tablet by mouth daily. With 400 mg of Calcium in it       Naproxen Sodium (ALEVE PO) Take by mouth as needed        polyethylene glycol - propylene glycol (SYSTANE ULTRA) 0.4-0.3 % SOLN Place 1 drop into both eyes every hour as needed.       No Known Allergies      ROS  CONSTITUTIONAL:NEGATIVE for fever, chills, change in weight  INTEGUMENTARY/SKIN: NEGATIVE for worrisome rashes, moles or lesions  EYES: NEGATIVE for vision changes or irritation  ENT/MOUTH: NEGATIVE for ear, mouth and throat problems  RESP:NEGATIVE for significant cough or SOB  BREAST: NEGATIVE for masses, tenderness or discharge  CV: NEGATIVE for chest pain, palpitations, VASQUEZ, orthopnea, PND  or peripheral edema  GI: NEGATIVE for nausea, abdominal pain, heartburn, or change in bowel habits  :NEGATIVE for frequency, dysuria, or hematuria  MUSCULOSKELETAL:NEGATIVE for significant arthralgias or myalgia  NEURO: NEGATIVE for weakness, dizziness or paresthesias  ENDOCRINE: NEGATIVE for polyuria/dipsia,  temperature intolerance, skin/hair changes  HEME/ALLERGY/IMMUNE: NEGATIVE for bleeding problems  PSYCHIATRIC: NEGATIVE for changes in mood or affect    EXAM  /79   Pulse 62   Temp 97.8  F (36.6  C) (Oral)   Ht 1.66 m (5' 5.35\")   Wt 60.3 kg (133 lb)   SpO2 97%   BMI 21.89 kg/m      GENERAL " APPEARANCE: Alert, pleasant, NAD  EYES: PERRL, EOMI, conjunctiva clear  HENT: TM normal bilaterally. Nose and mouth without lesions  NECK: no adenopathy, thyroid normal to palpation  RESP: lungs clear to auscultation bilaterally  BREAST: normal without masses, no tenderness or nipple discharge and no palpable  axillary masses or adenopathy  CV: regular rate and rhythm, normal S1 S2, no murmur, no carotid bruits  ABDOMEN: soft, nontender, without HSM or masses. Bowel sounds normal  : Not done  RECTAL EXAM: not done   MS: extremities normal- no gross deformities noted, no tender, hot or swollen joints.  SKIN: no suspicious lesions or rashes  NEURO: Normal strength and tone, sensory exam grossly normal, DTR normoreflexive in upper and lower extremities  PSYCH: mentation appears normal. and affect normal/bright.  EXT: no peripheral edema, pedal pulses palpable    Assessment:  (Z00.00) Encounter for Medicare annual wellness exam  (primary encounter diagnosis)  Comment: 69 year old female in stable health.  Plan: Comprehensive Metabolic Panel ([facility]),         Hemoglobin A1c ([place])        Anticipatory guidance given today regarding diet, exercise and calcium intake, safety. HCM up to date. Vaccines up to date. She will get mammogram in December 2019    (Z13.220) Screening for lipid disorders  Comment: Fasting  Plan: Lipid Panel ([facility])            (Z12.31) Encounter for screening mammogram for breast cancer  Comment: Routine screening  Plan: she will get mammogram in December in [place]    (E03.9) Hypothyroidism, unspecified type  Comment: TSH is in target range  TSH   Date Value Ref Range Status   08/21/2019 1.20 0.40 - 4.00 mU/L Final     Plan: levothyroxine (TIROSINT) 88 MCG capsule        Refilled medication today    (M85.80) Osteopenia, unspecified location  Comment: osteopenia. Did not meet criteria for medication  Plan: Vitamin D Deficiency        Recommend calcium 1200mg daily and Vit D 1000 international  unit(s) daily    (N95.2) Atrophic vaginitis  Comment: she uses topical estrogen, would like medication refill  Plan: estradiol (VAGIFEM) 10 MCG TABS vaginal tablet            Raven Buckley MD  Internal Medicine/Pediatrics      I, Antony Vivar, am serving as a scribe to document services personally performed by Dr. Raven Buckley, based on data collection and the provider's statements to me. Dr. Buckley has reviewed, edited, and approved the above note.

## 2019-08-21 NOTE — NURSING NOTE
"69 year old  Chief Complaint   Patient presents with     Physical     pt requesting thyroid labs as well as some other labs.        Blood pressure 119/79, pulse 62, temperature 97.8  F (36.6  C), temperature source Oral, height 1.66 m (5' 5.35\"), weight 60.3 kg (133 lb), SpO2 97 %. Body mass index is 21.89 kg/m .  Patient Active Problem List   Diagnosis     Migraine with aura and without status migrainosus, not intractable     Glaucoma     Pre-diabetes     Leg cramps     Stress at work     Muscle twitching     Hypothyroidism     Family history of malignant neoplasm of breast       Wt Readings from Last 2 Encounters:   08/21/19 60.3 kg (133 lb)   04/30/19 61.2 kg (135 lb)     BP Readings from Last 3 Encounters:   08/21/19 119/79   04/30/19 123/82   08/29/18 106/72         Current Outpatient Medications   Medication     Magnesium 400 MG CAPS     psyllium (METAMUCIL/KONSYL) 58.6 % powder     brinzolamide (AZOPT) 1 % ophthalmic suspension     CALCIUM CITRATE PO     estradiol (VAGIFEM) 10 MCG TABS     frovatriptan (FROVA) 2.5 MG tablet     levothyroxine (TIROSINT) 88 MCG capsule     medical cannabis (Patient's own supply.  Not a prescription)     medical cannabis (Patient's own supply.  Not a prescription)     metroNIDAZOLE 0.75 % LOTN     Multiple Vitamin (MULTIVITAMIN) per tablet     Naproxen Sodium (ALEVE PO)     polyethylene glycol - propylene glycol (SYSTANE ULTRA) 0.4-0.3 % SOLN     No current facility-administered medications for this visit.        Social History     Tobacco Use     Smoking status: Never Smoker     Smokeless tobacco: Never Used   Substance Use Topics     Alcohol use: No     Drug use: No       Health Maintenance Due   Topic Date Due     ADVANCE CARE PLANNING  1950     FALL RISK ASSESSMENT  07/02/2019       No results found for: PAP      August 21, 2019 7:58 AM  "

## 2019-08-22 RX ORDER — LEVOTHYROXINE SODIUM 88 UG/1
88 TABLET ORAL DAILY
Qty: 90 TABLET | Refills: 3 | Status: SHIPPED | OUTPATIENT
Start: 2019-08-22 | End: 2020-10-23

## 2019-09-29 ENCOUNTER — HEALTH MAINTENANCE LETTER (OUTPATIENT)
Age: 69
End: 2019-09-29

## 2019-10-21 ENCOUNTER — OFFICE VISIT (OUTPATIENT)
Dept: FAMILY MEDICINE | Facility: CLINIC | Age: 69
End: 2019-10-21
Payer: MEDICARE

## 2019-10-21 VITALS
TEMPERATURE: 97.8 F | SYSTOLIC BLOOD PRESSURE: 109 MMHG | DIASTOLIC BLOOD PRESSURE: 71 MMHG | BODY MASS INDEX: 22.82 KG/M2 | WEIGHT: 137 LBS | HEIGHT: 65 IN | HEART RATE: 59 BPM | OXYGEN SATURATION: 99 %

## 2019-10-21 DIAGNOSIS — H92.02 LEFT EAR PAIN: Primary | ICD-10-CM

## 2019-10-21 RX ORDER — LATANOPROST 50 UG/ML
SOLUTION/ DROPS OPHTHALMIC
Refills: 4 | COMMUNITY
Start: 2019-10-09 | End: 2024-07-15

## 2019-10-21 ASSESSMENT — MIFFLIN-ST. JEOR: SCORE: 1152.92

## 2019-10-21 NOTE — NURSING NOTE
"69 year old  Chief Complaint   Patient presents with     Ear Problem     left ear discomfort for about a week. pt reports her ear feels like its water in it and also some pressure build in it.       Blood pressure 109/71, pulse 59, temperature 97.8  F (36.6  C), temperature source Oral, height 1.66 m (5' 5.35\"), weight 62.1 kg (137 lb), SpO2 99 %. Body mass index is 22.55 kg/m .  Patient Active Problem List   Diagnosis     Migraine with aura and without status migrainosus, not intractable     Glaucoma     Pre-diabetes     Leg cramps     Stress at work     Muscle twitching     Hypothyroidism     Family history of malignant neoplasm of breast       Wt Readings from Last 2 Encounters:   10/21/19 62.1 kg (137 lb)   08/21/19 60.3 kg (133 lb)     BP Readings from Last 3 Encounters:   10/21/19 109/71   08/21/19 119/79   04/30/19 123/82         Current Outpatient Medications   Medication     estradiol (VAGIFEM) 10 MCG TABS vaginal tablet     frovatriptan (FROVA) 2.5 MG tablet     latanoprost (XALATAN) 0.005 % ophthalmic solution     levothyroxine (SYNTHROID/LEVOTHROID) 88 MCG tablet     Magnesium 400 MG CAPS     medical cannabis (Patient's own supply.  Not a prescription)     metroNIDAZOLE 0.75 % LOTN     psyllium (METAMUCIL/KONSYL) 58.6 % powder     brinzolamide (AZOPT) 1 % ophthalmic suspension     medical cannabis (Patient's own supply.  Not a prescription)     polyethylene glycol - propylene glycol (SYSTANE ULTRA) 0.4-0.3 % SOLN     No current facility-administered medications for this visit.        Social History     Tobacco Use     Smoking status: Never Smoker     Smokeless tobacco: Never Used   Substance Use Topics     Alcohol use: No     Drug use: No       Health Maintenance Due   Topic Date Due     ADVANCE CARE PLANNING  1950     INFLUENZA VACCINE (1) 09/01/2019       No results found for: PAP      October 21, 2019 10:43 AM  "

## 2019-10-21 NOTE — PROGRESS NOTES
"  SUBJECTIVE:   Keven Lopez is a 69 year old female who presents to clinic today for a return visit.    # Left Ear Pain  - has barometric pressure-sensitive migraines  - started wearing weatherx ear plugs that are marked to help with this  - has been using \"a lot\" recently  - started to feel sensation of fluid in her left ear  - now starting to feel more pressure, not exactly pain  - hearing slightly muffled occassionally on that side  - mild rhinorrhea, sneezing recently    ROS: Denies fevers, chills, chest pain, difficulty breathing, abdominal pain    Patient Active Problem List   Diagnosis     Migraine with aura and without status migrainosus, not intractable     Glaucoma     Pre-diabetes     Leg cramps     Stress at work     Muscle twitching     Hypothyroidism     Family history of malignant neoplasm of breast     Current Outpatient Medications   Medication     estradiol (VAGIFEM) 10 MCG TABS vaginal tablet     frovatriptan (FROVA) 2.5 MG tablet     latanoprost (XALATAN) 0.005 % ophthalmic solution     levothyroxine (SYNTHROID/LEVOTHROID) 88 MCG tablet     Magnesium 400 MG CAPS     medical cannabis (Patient's own supply.  Not a prescription)     metroNIDAZOLE 0.75 % LOTN     psyllium (METAMUCIL/KONSYL) 58.6 % powder     brinzolamide (AZOPT) 1 % ophthalmic suspension     medical cannabis (Patient's own supply.  Not a prescription)     polyethylene glycol - propylene glycol (SYSTANE ULTRA) 0.4-0.3 % SOLN     No current facility-administered medications for this visit.        I have reviewed the patient's relevant past medical history.     OBJECTIVE:   /71   Pulse 59   Temp 97.8  F (36.6  C) (Oral)   Ht 1.66 m (5' 5.35\")   Wt 62.1 kg (137 lb)   SpO2 99%   BMI 22.55 kg/m      Constitutional: well-appearing, appears stated age  Eyes: conjunctivae without erythema, sclera anicteric.   ENT: Right TM unremarkable. Left TM slightly bulging but not erythematous, no air-fluid levels visualized. " Trivial amounts of cerumen in both canals. Otic canals otherwise normal bilateral. Nasal mucosa normal.  Skin: no rashes, lesions, or wounds  Psych: affect is full and appropriate, speech is fluent and non-pressured      ASSESSMENT AND PLAN:     (H92.02) Left ear pain  (primary encounter diagnosis)  Comment: Suspect related to rhinosinusitis. No evidence of ear infection, low suspicion for bacterial rhinosinusitis this early in course with as mild of symptoms as she has. Start Flonase for possible allergic component of sinus symptoms (rhinorrhea, sneezing). Ok for pseudoephedrine or short-course (no more than 3 days) of Afrin nasal spray for symptomatic relief, particularly with flying (leaving for [place] next week).   Plan:     Malik Brunner MD   [facility]  10/21/2019, 11:17 AM

## 2019-10-22 RX ORDER — METRONIDAZOLE 7.5 MG/G
LOTION TOPICAL QHS
Qty: 59 | Refills: 6 | Status: ERX

## 2020-03-15 ENCOUNTER — HEALTH MAINTENANCE LETTER (OUTPATIENT)
Age: 70
End: 2020-03-15

## 2020-04-21 ENCOUNTER — DOCUMENTATION ONLY (OUTPATIENT)
Dept: CARE COORDINATION | Facility: CLINIC | Age: 70
End: 2020-04-21

## 2020-04-23 DIAGNOSIS — G43.109 MIGRAINE WITH AURA AND WITHOUT STATUS MIGRAINOSUS, NOT INTRACTABLE: ICD-10-CM

## 2020-04-23 NOTE — TELEPHONE ENCOUNTER
Last Office Visit: 8/21/19 for Medicare Wellness exam  Future [facility] Appointments: None  Medication last refilled: This is filled by Dr. Arellano, last on 7/16/19 #6 + 11 refills and patient reports she is out of this medication.     Routing refill request to provider for review/approval because: Patient asking Dr. Buckley to take over this medication.    At last OV patient reports she follows Dr. Arellano for migraines. Last visit with Dr. Arellano was 4/2018. She was to f/u in 1 year.     Lesa Nayak RN  04/23/20  4:38 PM

## 2020-04-23 NOTE — TELEPHONE ENCOUNTER
Health Call Center    Phone Message    May a detailed message be left on voicemail: yes     Reason for Call: Medication Question or concern regarding medication   Prescription Clarification    Name of Medication: frovatriptan (FROVA) 2.5 MG tablet     Prescribing Provider: Ashley Arellano MD    Pharmacy: [id number] [address] [facility]    What on the order needs clarification?   The patient said she would like to change this medication to Sumatriptan Succinate (Generic Imitrex) 100mg Tablet because her insurance changed and they won t cover the original prescription, I asked patient if she contacted the ordering doctor, she said Dr. Buckley has been following her migraine/headache care since she s started please call patient to address any questions or concerns            Action Taken: Message routed to:  [facility]: [facility]    Travel Screening: Not Applicable

## 2020-04-27 ENCOUNTER — VIRTUAL VISIT (OUTPATIENT)
Dept: FAMILY MEDICINE | Facility: CLINIC | Age: 70
End: 2020-04-27
Payer: COMMERCIAL

## 2020-04-27 DIAGNOSIS — G43.109 MIGRAINE WITH AURA AND WITHOUT STATUS MIGRAINOSUS, NOT INTRACTABLE: Primary | ICD-10-CM

## 2020-04-27 DIAGNOSIS — R73.03 PRE-DIABETES: ICD-10-CM

## 2020-04-27 RX ORDER — SUMATRIPTAN 100 MG/1
TABLET, FILM COATED ORAL
Qty: 9 TABLET | Refills: 11 | Status: SHIPPED | OUTPATIENT
Start: 2020-04-27 | End: 2021-11-09

## 2020-04-27 NOTE — PROGRESS NOTES
"Keven Lopez is a 70 year old female who is being evaluated via a billable telephone visit.      The patient has been notified of following:     \"This telephone visit will be conducted via a call between you and your physician/provider. We have found that certain health care needs can be provided without the need for a physical exam.  This service lets us provide the care you need with a short phone conversation.  If a prescription is necessary we can send it directly to your pharmacy.  If lab work is needed we can place an order for that and you can then stop by our lab to have the test done at a later time.    Telephone visits are billed at different rates depending on your insurance coverage. During this emergency period, for some insurers they may be billed the same as an in-person visit.  Please reach out to your insurance provider with any questions.    If during the course of the call the physician/provider feels a telephone visit is not appropriate, you will not be charged for this service.\"    Patient has given verbal consent for Telephone visit?  Yes    How would you like to obtain your AVS? Hanyhart    Subjective     Keven Lopez is a 70 year old female who presents to clinic today for the following health issues:    DM II  Sami is a 71 yo thin woman with type II, diet controlled DM. She has been followed by Dr. Vann at the [facility]. She saleem in [place] and reports that she recently had an A1c test there. She was surprised to see her A1c was 6.2% (12/2019), up from her baseline of 5.6%. She reports she was told about 10 yrs ago by her endocrinologist, the she would eventually develop more insulin resistance.  She denies current polydipsia/uria, paresthesias or skin infections.     Her doctor in [place] proposed a course of Metformin but she declined. Her doctor also recommended the book, \"How not to die\" and also recommended she watch \"Game changers\". These focus on plant " "based diets. Sami reports she used to eat a vegan diet years ago and now has been trying to adhere to this type of diet. She notes her blood sugars are running lower now,  instead of 110s. She was told by her doctor in [place] that she needed more extensive testing for diabetes and there were 4-5 tests that were recommended.# Sami is asking if I will order these tests.  She will be driving back from [place] next week with her spouse.     She has currently lost about 6 pounds with following this diet. She has been active, currently weighs about 123 lbs.     Sami send me a photo of tests recommended by her [place] doctor. She asks if I will order these.   Glutamic acid decarboxylase  Islet cell antibody  Insulin antibody  Islet antigen- 2, insulinoma-2 associated antibody  Zinc transporter antibody    Migraine headaches  Sami has a history of migraine headaches, which began when she was a teenager, about age 15. She noted they initially occurred with menses.  Headaches actually worsened after menopause. She has historically used Frova as rescue medication but it is no longer covered by insurance. She would like Rx for sumatriptan at this time, as it has worked in the past. She has been followed by neurology at the [facility] but is asking if I will assume prescribing for her headaches, which have been stable.     She reports the frequency of headache flares has dramatically improved since starting CBD. She was previously vaping but stopped and is now using an oral tincture and a patch, which she changes q 3 days. She also sees an acupuncturist who specializes in migraine management.     Previously, she reports she experienced a \"10 day headache\". Now she reports getting a mild (nonmigranous) headache once every week or two.     If she gets a migraine it is preceded by an aura with visual changes and pounding . No nausea or vomiting. These occur about once a month. She had previously been on gabapentin which " worked well about 6 months but since CBD was working well she has weaned off.     Triggers for migraines include: caffeine, citrus, alcohol, weather changes    Family history positive for migraines in dad and 6 siblings    She reports intermittent sleep disturbance, but does not correlate this to triggering headaches.     Glaucoma  Sami has glaucoma and has been followed by an ophthalmologist. She was told by a physician in [place] that she had a nevus on her eye. She was reassured this was not likely worrisome (they did not feel this was melanoma) but did recommend routine follow up with eye doctor in [place] when she returns. She will be seeing Dr. Charlie Hall , a Vitrioretinal specialist.     PVCs  Sami reports having PVC and was referred to see a cardiologist while in [place]. No current palpitations or chest pain.       Patient Active Problem List   Diagnosis     Migraine with aura and without status migrainosus, not intractable     Glaucoma     Pre-diabetes     Leg cramps     Stress at work     Muscle twitching     Hypothyroidism     Family history of malignant neoplasm of breast     Past Surgical History:   Procedure Laterality Date     BREAST BIOPSY, RT/LT       COLONOSCOPY  2/27/2012    Procedure:COLONOSCOPY; COLONOSCOPY; Surgeon:JLUIS HUNTER; Location: GI     SEPTOPLASTY, TURBINOPLASTY, COMBINED  5/24/2012    Procedure:COMBINED SEPTOPLASTY, TURBINOPLASTY; Septoplasty, Submuccosal Resection Turbinates; Surgeon:AARON ATKINSON; Location:[facility] OR     urethral stricture dilatation         Social History     Tobacco Use     Smoking status: Never Smoker     Smokeless tobacco: Never Used   Substance Use Topics     Alcohol use: No     Family History   Problem Relation Age of Onset     Diabetes Father      Lymphoma Father         CLL     Hypertension Father      Hyperlipidemia Father      Migraines Father      Bipolar Disorder Mother      Parkinsonism Mother      Asthma Mother      Pulmonary Embolism  Mother      Diabetes Paternal Grandmother      Diabetes Sister         obese     Migraines Sister      Breast Cancer Sister 60     Migraines Sister      Migraines Brother      Diabetes Brother      Parkinsonism Maternal Uncle         x 2 brothers           Reviewed and updated as needed this visit by Provider      Review of Systems   ROS COMP: Constitutional, HEENT, cardiovascular, pulmonary, gi and gu systems are negative, except as otherwise noted.    Objective   Reported vitals:  There were no vitals taken for this visit.       Assessment/Plan:  (G43.109) Migraine with aura and without status migrainosus, not intractable  (primary encounter diagnosis)  Comment: current migraines are stable, which she attributes to acupuncture and CBD. Frova not covered by insurance. Asking for Rx for sumatriptan to use prn  Plan: SUMAtriptan (IMITREX) 100 MG tablet        Rx sent to pharmacy    (R73.03) Pre-diabetes  Comment: currently A1c has risen to 6.2% from her baseline of 5.6%. she is eating a near vegan diet and hopes this will offset progress of diabetes. Fasting glucose readings are now below 100 consistently.   Plan: recommend she follow up with her endocrinologist when she returns to discuss any additional testing. At this time, I reassured her that these labs were not urgent, and could wait for there endocrinologist review.     3:24 PM  3:53 PM  Phone call duration:  29 minutes    Raven Buckley MD  Internal Medicine/Pediatrics

## 2020-04-28 RX ORDER — FROVATRIPTAN SUCCINATE 2.5 MG/1
2.5 TABLET, FILM COATED ORAL
Qty: 6 TABLET | Refills: 1 | Status: CANCELLED | OUTPATIENT
Start: 2020-04-28

## 2020-04-28 NOTE — TELEPHONE ENCOUNTER
This medication was discontinued at last office visit on 4/27/20 with Dr. Buckley.    Lesa Nayak RN  04/28/20  4:36 PM

## 2020-04-29 VITALS — WEIGHT: 123 LBS | BODY MASS INDEX: 20.25 KG/M2

## 2020-05-15 ENCOUNTER — APPOINTMENT (OUTPATIENT)
Age: 70
Setting detail: DERMATOLOGY
End: 2020-05-17

## 2020-05-15 DIAGNOSIS — L98.8 OTHER SPECIFIED DISORDERS OF THE SKIN AND SUBCUTANEOUS TISSUE: ICD-10-CM

## 2020-05-15 DIAGNOSIS — D22 MELANOCYTIC NEVI: ICD-10-CM

## 2020-05-15 PROBLEM — D22.9 MELANOCYTIC NEVI, UNSPECIFIED: Status: ACTIVE | Noted: 2020-05-15

## 2020-05-15 PROCEDURE — 99213 OFFICE O/P EST LOW 20 MIN: CPT

## 2020-05-15 PROCEDURE — OTHER DIAGNOSIS COMMENT: OTHER

## 2020-05-15 PROCEDURE — OTHER MIPS QUALITY: OTHER

## 2020-05-15 PROCEDURE — OTHER COUNSELING: OTHER

## 2020-05-15 ASSESSMENT — LOCATION SIMPLE DESCRIPTION DERM
LOCATION SIMPLE: RIGHT EYE
LOCATION SIMPLE: LEFT LIP

## 2020-05-15 ASSESSMENT — LOCATION DETAILED DESCRIPTION DERM
LOCATION DETAILED: LEFT INFERIOR VERMILION LIP
LOCATION DETAILED: RIGHT IRIS

## 2020-05-15 ASSESSMENT — LOCATION ZONE DERM
LOCATION ZONE: LIP
LOCATION ZONE: CORNEA

## 2020-05-15 NOTE — PROCEDURE: DIAGNOSIS COMMENT
Detail Level: Simple
Comment: Per patient, she was diagnosed by her eye doctor with a benign appearing nevus inside her right eye, on the retina. Patient states she has an appointment with Dr. Coker, a retina specialist on 05/18/2020.

## 2020-05-18 ENCOUNTER — TRANSFERRED RECORDS (OUTPATIENT)
Dept: HEALTH INFORMATION MANAGEMENT | Facility: CLINIC | Age: 70
End: 2020-05-18

## 2020-05-20 ENCOUNTER — TELEPHONE (OUTPATIENT)
Dept: NEUROLOGY | Facility: CLINIC | Age: 70
End: 2020-05-20

## 2020-05-20 NOTE — TELEPHONE ENCOUNTER
Prior Authorization Retail Medication Request    Medication/Dose: Frovatriptan 2.5MG Tabs  ICD code: G43.109   Previously Tried and Failed:    Rationale:      Insurance Name:[employer] Ph: [phone]   Insurance ID: [id number]       Pharmacy Information   Name: [id number] [place], [place] - [address] AT [facility] OF [address]   Phone: [phone]

## 2020-05-20 NOTE — TELEPHONE ENCOUNTER
Hello,    Unfortunately, this medication is listed as discontinued in the patients chart and because of this, our PA Team cannot begin the prior authorization process. Please confirm with provider if medication is still active and if so, please re-route this request to the prior authorization pool.    Thank you,    Central PA Team

## 2020-06-03 ENCOUNTER — APPOINTMENT (OUTPATIENT)
Age: 70
Setting detail: DERMATOLOGY
End: 2020-06-07

## 2020-06-03 DIAGNOSIS — L81.4 OTHER MELANIN HYPERPIGMENTATION: ICD-10-CM

## 2020-06-03 DIAGNOSIS — D18.0 HEMANGIOMA: ICD-10-CM

## 2020-06-03 DIAGNOSIS — D22 MELANOCYTIC NEVI: ICD-10-CM

## 2020-06-03 DIAGNOSIS — L82.0 INFLAMED SEBORRHEIC KERATOSIS: ICD-10-CM

## 2020-06-03 DIAGNOSIS — D17 BENIGN LIPOMATOUS NEOPLASM: ICD-10-CM

## 2020-06-03 DIAGNOSIS — L82.1 OTHER SEBORRHEIC KERATOSIS: ICD-10-CM

## 2020-06-03 DIAGNOSIS — Z80.8 FAMILY HISTORY OF MALIGNANT NEOPLASM OF OTHER ORGANS OR SYSTEMS: ICD-10-CM

## 2020-06-03 PROBLEM — D22.5 MELANOCYTIC NEVI OF TRUNK: Status: ACTIVE | Noted: 2020-06-03

## 2020-06-03 PROBLEM — D18.01 HEMANGIOMA OF SKIN AND SUBCUTANEOUS TISSUE: Status: ACTIVE | Noted: 2020-06-03

## 2020-06-03 PROBLEM — D17.21 BENIGN LIPOMATOUS NEOPLASM OF SKIN AND SUBCUTANEOUS TISSUE OF RIGHT ARM: Status: ACTIVE | Noted: 2020-06-03

## 2020-06-03 PROBLEM — D17.22 BENIGN LIPOMATOUS NEOPLASM OF SKIN AND SUBCUTANEOUS TISSUE OF LEFT ARM: Status: ACTIVE | Noted: 2020-06-03

## 2020-06-03 PROBLEM — D22.9 MELANOCYTIC NEVI, UNSPECIFIED: Status: ACTIVE | Noted: 2020-06-03

## 2020-06-03 PROCEDURE — 99214 OFFICE O/P EST MOD 30 MIN: CPT | Mod: 25

## 2020-06-03 PROCEDURE — OTHER DEFER: OTHER

## 2020-06-03 PROCEDURE — OTHER MIPS QUALITY: OTHER

## 2020-06-03 PROCEDURE — OTHER COUNSELING: OTHER

## 2020-06-03 PROCEDURE — OTHER SHAVE REMOVAL (NO PATHOLOGY): OTHER

## 2020-06-03 PROCEDURE — OTHER DIAGNOSIS COMMENT: OTHER

## 2020-06-03 PROCEDURE — 11305 SHAVE SKIN LESION 0.5 CM/<: CPT

## 2020-06-03 ASSESSMENT — LOCATION DETAILED DESCRIPTION DERM
LOCATION DETAILED: RIGHT SUPERIOR MEDIAL UPPER BACK
LOCATION DETAILED: SUPERIOR THORACIC SPINE
LOCATION DETAILED: RIGHT PROXIMAL RADIAL DORSAL FOREARM
LOCATION DETAILED: LEFT SUPERIOR MEDIAL UPPER BACK
LOCATION DETAILED: RIGHT MEDIAL UPPER BACK
LOCATION DETAILED: LEFT INFERIOR LATERAL NECK
LOCATION DETAILED: RIGHT IRIS
LOCATION DETAILED: LEFT PROXIMAL DORSAL FOREARM
LOCATION DETAILED: RIGHT INFERIOR MEDIAL UPPER BACK
LOCATION DETAILED: LEFT DISTAL POSTERIOR UPPER ARM

## 2020-06-03 ASSESSMENT — LOCATION SIMPLE DESCRIPTION DERM
LOCATION SIMPLE: RIGHT FOREARM
LOCATION SIMPLE: LEFT FOREARM
LOCATION SIMPLE: RIGHT EYE
LOCATION SIMPLE: LEFT UPPER BACK
LOCATION SIMPLE: LEFT POSTERIOR UPPER ARM
LOCATION SIMPLE: LEFT ANTERIOR NECK
LOCATION SIMPLE: UPPER BACK
LOCATION SIMPLE: RIGHT UPPER BACK

## 2020-06-03 ASSESSMENT — LOCATION ZONE DERM
LOCATION ZONE: NECK
LOCATION ZONE: CORNEA
LOCATION ZONE: ARM
LOCATION ZONE: TRUNK

## 2020-06-03 NOTE — PROCEDURE: DEFER
Instructions (Optional): Well do up to two lipomas at one time.
Introduction Text (Please End With A Colon): The following procedure was deferred:
Detail Level: Detailed
Procedure To Be Performed At Next Visit: Excision

## 2020-06-03 NOTE — PROCEDURE: SHAVE REMOVAL (NO PATHOLOGY)
Detail Level: Detailed
X Size Of Lesion In Cm (Optional): 0.3
Size Of Lesion In Cm: 0.4
Render Path Notes In Note?: No
Post-Care Instructions: I reviewed with the patient in detail post-care instructions. Patient is to keep the biopsy site dry overnight, and then apply bacitracin twice daily until healed. Patient may apply hydrogen peroxide soaks to remove any crusting.
Medical Necessity Information: It is in your best interest to select a reason for this procedure from the list below. All of these items fulfill various CMS LCD requirements except the new and changing color options.
Medical Necessity Clause: This procedure was medically necessary because the lesion that was treated was:
Wound Care: Petrolatum
Consent was obtained from the patient. The risks and benefits to therapy were discussed in detail. Specifically, the risks of infection, scarring, bleeding, prolonged wound healing, incomplete removal, allergy to anesthesia, nerve injury and recurrence were addressed. Prior to the procedure, the treatment site was clearly identified and confirmed by the patient. All components of Universal Protocol/PAUSE Rule completed.
Hemostasis: Drysol
Path Notes (To The Dermatopathologist): Please check margins.
Anesthesia Type: 1% lidocaine with epinephrine and a 1:10 solution of 8.4% sodium bicarbonate

## 2020-06-03 NOTE — PROCEDURE: DIAGNOSIS COMMENT
Comment: Per patient, she was diagnosed by her eye doctor with a benign appearing nevus inside her right eye, on the retina. Patient states she has an appointment with Dr. Coker, a retina specialist in 4 months to continue to monitor it.
Detail Level: Simple

## 2020-06-03 NOTE — PROCEDURE: MIPS QUALITY
Quality 130: Documentation Of Current Medications In The Medical Record: Current Medications Documented
Quality 110: Preventive Care And Screening: Influenza Immunization: Influenza Immunization Administered during Influenza season
Quality 111:Pneumonia Vaccination Status For Older Adults: Pneumococcal Vaccination Previously Received
Quality 226: Preventive Care And Screening: Tobacco Use: Screening And Cessation Intervention: Patient screened for tobacco use and is an ex/non-smoker
Quality 431: Preventive Care And Screening: Unhealthy Alcohol Use - Screening: Patient screened for unhealthy alcohol use using a single question and scores less than 2 times per year
Detail Level: Detailed

## 2020-06-11 DIAGNOSIS — E03.8 OTHER SPECIFIED HYPOTHYROIDISM: Primary | ICD-10-CM

## 2020-06-11 DIAGNOSIS — R73.03 PREDIABETES: ICD-10-CM

## 2020-06-15 DIAGNOSIS — Z13.9 SCREENING FOR CONDITION: ICD-10-CM

## 2020-06-15 DIAGNOSIS — R73.03 PRE-DIABETES: Primary | ICD-10-CM

## 2020-06-15 RX ORDER — MAGNESIUM 200 MG
1000 TABLET ORAL DAILY
Qty: 90 TABLET | Refills: 3 | COMMUNITY
Start: 2020-06-15

## 2020-06-15 NOTE — PROGRESS NOTES
"Keven Lopez is a 70 year old female who is being evaluated via a billable telephone visit.      The patient has been notified of following:     \"This telephone visit will be conducted via a call between you and your physician/provider. We have found that certain health care needs can be provided without the need for a physical exam.  This service lets us provide the care you need with a short phone conversation.  If a prescription is necessary we can send it directly to your pharmacy.  If lab work is needed we can place an order for that and you can then stop by our lab to have the test done at a later time.    Telephone visits are billed at different rates depending on your insurance coverage. During this emergency period, for some insurers they may be billed the same as an in-person visit.  Please reach out to your insurance provider with any questions.    If during the course of the call the physician/provider feels a telephone visit is not appropriate, you will not be charged for this service.\"    Patient has given verbal consent for Telephone visit?  Yes    What phone number would you like to be contacted at? [phone]    How would you like to obtain your AVS? Mail a copy          "

## 2020-06-16 ENCOUNTER — VIRTUAL VISIT (OUTPATIENT)
Dept: ENDOCRINOLOGY | Facility: CLINIC | Age: 70
End: 2020-06-16
Payer: COMMERCIAL

## 2020-06-16 DIAGNOSIS — Z13.9 SCREENING FOR CONDITION: ICD-10-CM

## 2020-06-16 DIAGNOSIS — E03.8 OTHER SPECIFIED HYPOTHYROIDISM: ICD-10-CM

## 2020-06-16 DIAGNOSIS — R73.03 PREDIABETES: Primary | ICD-10-CM

## 2020-06-16 DIAGNOSIS — R73.03 PRE-DIABETES: ICD-10-CM

## 2020-06-16 DIAGNOSIS — R73.03 PREDIABETES: ICD-10-CM

## 2020-06-16 LAB
CHOLEST SERPL-MCNC: 160 MG/DL
HBA1C MFR BLD: 5.4 % (ref 0–5.6)
HDLC SERPL-MCNC: 51 MG/DL
LDLC SERPL CALC-MCNC: 83 MG/DL
NONHDLC SERPL-MCNC: 110 MG/DL
TRIGL SERPL-MCNC: 132 MG/DL
TSH SERPL DL<=0.005 MIU/L-ACNC: 1.22 MU/L (ref 0.4–4)
VIT B12 SERPL-MCNC: 817 PG/ML (ref 193–986)

## 2020-06-16 NOTE — LETTER
"6/16/2020       RE: Keven Lopez  [address] [place] [address] [address]  [address]  [facility] [address]     Dear Colleague,    Thank you for referring your patient, Keven Lopez, to the [facility] ENDOCRINOLOGY at [facility]. Please see a copy of my visit note below.    Keven Lopez is a 70 year old female who is being evaluated via a billable telephone visit.      The patient has been notified of following:     \"This telephone visit will be conducted via a call between you and your physician/provider. We have found that certain health care needs can be provided without the need for a physical exam.  This service lets us provide the care you need with a short phone conversation.  If a prescription is necessary we can send it directly to your pharmacy.  If lab work is needed we can place an order for that and you can then stop by our lab to have the test done at a later time.    Telephone visits are billed at different rates depending on your insurance coverage. During this emergency period, for some insurers they may be billed the same as an in-person visit.  Please reach out to your insurance provider with any questions.    If during the course of the call the physician/provider feels a telephone visit is not appropriate, you will not be charged for this service.\"    Patient has given verbal consent for Telephone visit?  Yes    What phone number would you like to be contacted at? [phone]    How would you like to obtain your AVS? Mail a copy            Keven Lopez is a 70 year old female who is being evaluated via a billable telephone visit.      The patient has been notified of following:     \"This telephone visit will be conducted via a call between you and your physician/provider. We have found that certain health care needs can be provided without the need for a physical exam.  This service lets us provide the care you need with a short " "phone conversation.  If a prescription is necessary we can send it directly to your pharmacy.  If lab work is needed we can place an order for that and you can then stop by our lab to have the test done at a later time.    Telephone visits are billed at different rates depending on your insurance coverage. During this emergency period, for some insurers they may be billed the same as an in-person visit.  Please reach out to your insurance provider with any questions.    If during the course of the call the physician/provider feels a telephone visit is not appropriate, you will not be charged for this service.\"    Patient has given verbal consent for Telephone visit?  Yes    What phone number would you like to be contacted at? [phone]    How would you like to obtain your AVS? Mail a copy     This 70 year old woman was called for f/u of her pre-diabetes and osteopenia.  She was first here several years ago to discuss dietary management of diabetes.  At that time, she said she changed her diet to a vegan diet because she was found to have diabetes based on a fasting sugar over 126.  She lost about 10 lbs on this diet and her diabetes resolved.  She had maintained that diet for several years and kept her A1cs in the 5 % range.  Her A1c was found to be 6.2% in the winter and she went back to eating her whole a plant-based diet.  She has been checking her blood sugars and found that the pre-meals are now below 100 whereas before she changed her diet, she was usually above 105.  She is very concerned about the progression of diabetes and has several questions about how to best prevent her from progressing to diabetes.    With respect to her osteopenia, she is getting about 1000 mg of calcium a day from her food.  Her primary care doctor has ordered a repeat DEXA in July.  At this point she will follow-up with her primary care doctor about this.    She continues on 88 mcg of levothyroxine each day.  The dose was " decreased from 100 mcg each day last year.  She did not have any changes in how she felt with the lower dose.    Today she reports she feels well.  She has no paresthesias or ulcers on her feet.  She denies chest pain, shortness of breath, GI complaints, mood disturbances.    Current Outpatient Medications   Medication     brinzolamide (AZOPT) 1 % ophthalmic suspension     estradiol (VAGIFEM) 10 MCG TABS vaginal tablet     latanoprost (XALATAN) 0.005 % ophthalmic solution     levothyroxine (SYNTHROID/LEVOTHROID) 88 MCG tablet     Magnesium 400 MG CAPS     medical cannabis (Patient's own supply.  Not a prescription)     medical cannabis (Patient's own supply.  Not a prescription)     metroNIDAZOLE 0.75 % LOTN     polyethylene glycol - propylene glycol (SYSTANE ULTRA) 0.4-0.3 % SOLN     psyllium (METAMUCIL/KONSYL) 58.6 % powder     SUMAtriptan (IMITREX) 100 MG tablet     cyanocobalamin (VITAMIN B-12) 1000 MCG SUBL sublingual tablet     No current facility-administered medications for this visit.      On exam she is in no acute distress.  Her mood appears good.  Her speech is logical and coherent.      Dexa scan from care everywhere:     2017    This patient's T-score meets the World Health Organization   (WHO) criteria for low bone density (osteopenia) at one or more measured   sites (T-score between less than -1.0 and greater than -2.5).  The risk of   osteoporotic fracture increases approximately two-fold for each 1.0 SD   decrease in T-score.    COMPARISON:  Comparison with previous study dated 04/02/2015 shows:    There was no statistically significant change in the bone mineral density   in the spine.    There was no statistically significant change in the bone mineral density   in the right hip.    There was no statistically significant change in the bone mineral density   in the left hip.      Comparison with baseline study dated 06/17/2003 shows:    There was a statistically significant decrease in the bone  mineral density   in the spine.    There was a statistically significant decrease in the bone mineral density   in the right hip.    There was a statistically significant decrease in the bone mineral density   in the left hip.      Statistical significance is determined by the least significant change   (LSC) for the scanner and operators at this facility.    FRAX (WHO Fracture Risk Assessment Tool)    10-year Probability of Fracture:    Major Osteoporotic:  7.1%  Hip:  0.5%  Population:  [place] ()  Based on Dual Femur Left Neck BMD    More information and the calculation tool can be found at this website:    http://www.shef.ac.uk/FRAX/    The scan details are available in the patient's chart in [facility].    Diana Ca M.D.  Body/Breast Radiologist  [facility]   [email or web address]  TAO/sharmin  R:7/21/2017/T:7/21/2017      Result Narrative   DIAGNOSTIC DXA BONE MINERAL DENSITY, 7/21/2017    CLINICAL HISTORY:  This is a 67-year-old female patient.      RISK FACTORS:  The patient has estrogen deficiency.  The patient has a   history of low bone density based on a prior BMD study (osteopenia).  The   patient has a history of osteoporosis based on a prior BMD study.  The   patient has taken for at least one month the following medications in the   last year:  Synthroid.    TECHNIQUE:  Dual-energy x-ray absorptiometry system (axial skeleton).  The   patient was scanned on a GE Lunar Prodigy scanner, fast-array scan mode.    The study was technically adequate.      FINDINGS:       BMD T-Score Z-Score   Lumbar Spine (L1 to L4) 0.934 g/cm2 -2.1 -0.2   Right Hip Femoral Neck 0.945 g/cm2 -0.7 1.0   Right Total Hip (BMD for comparison to prior) 0.969 g/cm2 -0.3 1.2   Left Hip Femoral Neck 0.934 g/cm2 -0.7 1.0   Left Total Hip (BMD for comparison to prior) 0.937 g/cm2 -0.6 0.9      ENDO CALCIUM LABS-UMP Latest Ref Rng & Units 8/21/2019 8/7/2018   CALCIUM 8.5 - 10.4 mg/dL 9.7     PHOSPHOROUS 2.5 - 4.5 mg/dL     MAGNESIUM 1.6 - 2.3 mg/dL     ALBUMIN 3.2 - 4.5 g/dL 3.7    BUN 7.0 - 30.0 mg/dL 17.0    CREATININE 0.6 - 1.3 mg/dL 0.8    PARATHYROID HORMONE INTACT 12 - 72 pg/mL     ALKPHOS 40.0 - 150.0 U/L 58.0    25 OH VIT D2 ug/L     25 OH VIT D3 ug/L     25 OH VIT D TOTAL 30 - 75 ug/L     VITAMIN D DEFICIENCY SCREENING 20 - 75 ug/L 44    PROTEIN, TOTAL 6.8 - 8.8 g/dL 7.1    CALCIUM URINE G/24 H 0.10 - 0.30 g/24 h     CALCIUM URINE G/G CR g/g Cr     CALCIUM URINE MG/DL mg/dL     ENDO DIABETES Latest Ref Rng & Units 8/21/2019    HEMOGLOBIN A1C 4.1 - 5.7 % 5.4    HEMOGLOBIN A1C, POC 4.3 - 6 %     HGB 11.7 - 15.7 g/dL     GLUCOSE 60.0 - 99.0 mg/dL 104.0 (H)    CHOLESTEROL <200 mg/dL 235 (H)    LDL CHOLESTEROL, CALCULATED <100 mg/dL 130 (H)    LDL CHOLESTEROL DIRECT 0.0 - 129.0     HDL CHOLESTEROL >49 mg/dL 95    VLDL-CHOLESTEROL 7.0 - 32.0     NON HDL CHOLESTEROL <130 mg/dL 140 (H)    TRIGLYCERIDES <150 mg/dL 49    CREATININE 0.6 - 1.3 mg/dL 0.8    POTASSIUM 3.4 - 5.3 mmol/L 4.0    ALT 0.0 - 50.0 U/L 20.0    AST 0.0 - 45.0 U/L 22.0    WBC 4.0 - 11.0 10e9/L     RBC 3.8 - 5.2 10e12/L     HGB 11.7 - 15.7 g/dL     HCT 35.0 - 47.0 %     MCV 78 - 100 fl     MCH 26.5 - 33.0 pg     MCHC 31.5 - 36.5 g/dL     RDW 10.0 - 15.0 %      - 450 10e9/L     ENDO THYROID LABS-UMP Latest Ref Rng & Units 8/21/2019 8/7/2018   TSH 0.40 - 4.00 mU/L 1.20 0.42   T4 FREE 0.76 - 1.46 ng/dL  1.36   TRIIODOTHYRONINE(T3) 60 - 181 ng/dL  58 (L)   THYR PEROXIDASE IFEANYI <35 IU/mL     THYROID STIM IMMUNOG          Recent Labs   Lab Test 06/16/20  0900 08/21/19  0910 08/21/19  0853 08/21/19  0831 08/07/18  1433  05/22/18  12/18/15  1023  07/07/14   A1C 5.4  --   --  5.4  --   --  5.4%  5.4  5.4%  5.4  5.4%  5.4  5.4%  5.4  5.4  5.4  5.4   < > 5.7   < >  --    HEMOGLOBINA1  --   --   --   --   --   --   --   --   --   --  5.7   TSH 1.22  --  1.20  --  0.42  --  0.15   < > 1.21  --   --    T4  --   --   --   --  1.36   --  1.8  --   --   --   --    LDL 83 130*  --   --   --    < >  --    < > 140*  --   --    HDL 51 95  --   --   --    < >  --    < > 108  --   --    TRIG 132 49  --   --   --    < >  --    < > 50  --   --    CR  --   --   --  0.8  --   --   --   --  0.86   < >  --     < > = values in this interval not displayed.     Assessment and plan:    1.prediabetes.  Her A1c is in the normal range.  She is following a plant-based diet.  I encouraged her to continue to do so.  I do not see any reason to check a C-peptide because her blood sugars clearly are normal.  She is at risk for progression of type 2 diabetes but a C-peptide level will not change our recommendations at this time.    2.hypothyroidism.  Her TSH is in target.  I recommend she stay on the current dose of T4.    3.history of osteopenia.  She appears to be getting enough calcium in her diet.  Her primary care doctor will review the DEXA after it is done in July.  A vitamin D level is pending    Follow-up PRN    I spent 24 minutes on the phone with this patient.    Salena Vann MD      Again, thank you for allowing me to participate in the care of your patient.      Sincerely,    Salena Vann MD

## 2020-06-16 NOTE — PROGRESS NOTES
"Keven Lopez is a 70 year old female who is being evaluated via a billable telephone visit.      The patient has been notified of following:     \"This telephone visit will be conducted via a call between you and your physician/provider. We have found that certain health care needs can be provided without the need for a physical exam.  This service lets us provide the care you need with a short phone conversation.  If a prescription is necessary we can send it directly to your pharmacy.  If lab work is needed we can place an order for that and you can then stop by our lab to have the test done at a later time.    Telephone visits are billed at different rates depending on your insurance coverage. During this emergency period, for some insurers they may be billed the same as an in-person visit.  Please reach out to your insurance provider with any questions.    If during the course of the call the physician/provider feels a telephone visit is not appropriate, you will not be charged for this service.\"    Patient has given verbal consent for Telephone visit?  Yes    What phone number would you like to be contacted at? [phone]    How would you like to obtain your AVS? Mail a copy     This 70 year old woman was called for f/u of her pre-diabetes and osteopenia.  She was first here several years ago to discuss dietary management of diabetes.  At that time, she said she changed her diet to a vegan diet because she was found to have diabetes based on a fasting sugar over 126.  She lost about 10 lbs on this diet and her diabetes resolved.  She had maintained that diet for several years and kept her A1cs in the 5 % range.  Her A1c was found to be 6.2% in the winter and she went back to eating her whole a plant-based diet.  She has been checking her blood sugars and found that the pre-meals are now below 100 whereas before she changed her diet, she was usually above 105.  She is very concerned about the " progression of diabetes and has several questions about how to best prevent her from progressing to diabetes.    With respect to her osteopenia, she is getting about 1000 mg of calcium a day from her food.  Her primary care doctor has ordered a repeat DEXA in July.  At this point she will follow-up with her primary care doctor about this.    She continues on 88 mcg of levothyroxine each day.  The dose was decreased from 100 mcg each day last year.  She did not have any changes in how she felt with the lower dose.    Today she reports she feels well.  She has no paresthesias or ulcers on her feet.  She denies chest pain, shortness of breath, GI complaints, mood disturbances.    Current Outpatient Medications   Medication     brinzolamide (AZOPT) 1 % ophthalmic suspension     estradiol (VAGIFEM) 10 MCG TABS vaginal tablet     latanoprost (XALATAN) 0.005 % ophthalmic solution     levothyroxine (SYNTHROID/LEVOTHROID) 88 MCG tablet     Magnesium 400 MG CAPS     medical cannabis (Patient's own supply.  Not a prescription)     medical cannabis (Patient's own supply.  Not a prescription)     metroNIDAZOLE 0.75 % LOTN     polyethylene glycol - propylene glycol (SYSTANE ULTRA) 0.4-0.3 % SOLN     psyllium (METAMUCIL/KONSYL) 58.6 % powder     SUMAtriptan (IMITREX) 100 MG tablet     cyanocobalamin (VITAMIN B-12) 1000 MCG SUBL sublingual tablet     No current facility-administered medications for this visit.      On exam she is in no acute distress.  Her mood appears good.  Her speech is logical and coherent.      Dexa scan from care everywhere:     2017    This patient's T-score meets the World Health Organization   (WHO) criteria for low bone density (osteopenia) at one or more measured   sites (T-score between less than -1.0 and greater than -2.5).  The risk of   osteoporotic fracture increases approximately two-fold for each 1.0 SD   decrease in T-score.    COMPARISON:  Comparison with previous study dated 04/02/2015  shows:    There was no statistically significant change in the bone mineral density   in the spine.    There was no statistically significant change in the bone mineral density   in the right hip.    There was no statistically significant change in the bone mineral density   in the left hip.      Comparison with baseline study dated 06/17/2003 shows:    There was a statistically significant decrease in the bone mineral density   in the spine.    There was a statistically significant decrease in the bone mineral density   in the right hip.    There was a statistically significant decrease in the bone mineral density   in the left hip.      Statistical significance is determined by the least significant change   (LSC) for the scanner and operators at this facility.    FRAX (WHO Fracture Risk Assessment Tool)    10-year Probability of Fracture:    Major Osteoporotic:  7.1%  Hip:  0.5%  Population:  [place] ()  Based on Dual Femur Left Neck BMD    More information and the calculation tool can be found at this website:    http://www.shef.ac.uk/FRAX/    The scan details are available in the patient's chart in [facility].    Diana Ca M.D.  Body/Breast Radiologist  [facility]   [email or web address]  TAO/sharmin  R:7/21/2017/T:7/21/2017      Result Narrative   DIAGNOSTIC DXA BONE MINERAL DENSITY, 7/21/2017    CLINICAL HISTORY:  This is a 67-year-old female patient.      RISK FACTORS:  The patient has estrogen deficiency.  The patient has a   history of low bone density based on a prior BMD study (osteopenia).  The   patient has a history of osteoporosis based on a prior BMD study.  The   patient has taken for at least one month the following medications in the   last year:  Synthroid.    TECHNIQUE:  Dual-energy x-ray absorptiometry system (axial skeleton).  The   patient was scanned on a GE Lunar Prodigy scanner, fast-array scan mode.    The study was technically  adequate.      FINDINGS:       BMD T-Score Z-Score   Lumbar Spine (L1 to L4) 0.934 g/cm2 -2.1 -0.2   Right Hip Femoral Neck 0.945 g/cm2 -0.7 1.0   Right Total Hip (BMD for comparison to prior) 0.969 g/cm2 -0.3 1.2   Left Hip Femoral Neck 0.934 g/cm2 -0.7 1.0   Left Total Hip (BMD for comparison to prior) 0.937 g/cm2 -0.6 0.9      ENDO CALCIUM LABS-UMP Latest Ref Rng & Units 8/21/2019 8/7/2018   CALCIUM 8.5 - 10.4 mg/dL 9.7    PHOSPHOROUS 2.5 - 4.5 mg/dL     MAGNESIUM 1.6 - 2.3 mg/dL     ALBUMIN 3.2 - 4.5 g/dL 3.7    BUN 7.0 - 30.0 mg/dL 17.0    CREATININE 0.6 - 1.3 mg/dL 0.8    PARATHYROID HORMONE INTACT 12 - 72 pg/mL     ALKPHOS 40.0 - 150.0 U/L 58.0    25 OH VIT D2 ug/L     25 OH VIT D3 ug/L     25 OH VIT D TOTAL 30 - 75 ug/L     VITAMIN D DEFICIENCY SCREENING 20 - 75 ug/L 44    PROTEIN, TOTAL 6.8 - 8.8 g/dL 7.1    CALCIUM URINE G/24 H 0.10 - 0.30 g/24 h     CALCIUM URINE G/G CR g/g Cr     CALCIUM URINE MG/DL mg/dL     ENDO DIABETES Latest Ref Rng & Units 8/21/2019    HEMOGLOBIN A1C 4.1 - 5.7 % 5.4    HEMOGLOBIN A1C, POC 4.3 - 6 %     HGB 11.7 - 15.7 g/dL     GLUCOSE 60.0 - 99.0 mg/dL 104.0 (H)    CHOLESTEROL <200 mg/dL 235 (H)    LDL CHOLESTEROL, CALCULATED <100 mg/dL 130 (H)    LDL CHOLESTEROL DIRECT 0.0 - 129.0     HDL CHOLESTEROL >49 mg/dL 95    VLDL-CHOLESTEROL 7.0 - 32.0     NON HDL CHOLESTEROL <130 mg/dL 140 (H)    TRIGLYCERIDES <150 mg/dL 49    CREATININE 0.6 - 1.3 mg/dL 0.8    POTASSIUM 3.4 - 5.3 mmol/L 4.0    ALT 0.0 - 50.0 U/L 20.0    AST 0.0 - 45.0 U/L 22.0    WBC 4.0 - 11.0 10e9/L     RBC 3.8 - 5.2 10e12/L     HGB 11.7 - 15.7 g/dL     HCT 35.0 - 47.0 %     MCV 78 - 100 fl     MCH 26.5 - 33.0 pg     MCHC 31.5 - 36.5 g/dL     RDW 10.0 - 15.0 %      - 450 10e9/L     ENDO THYROID LABS-UMP Latest Ref Rng & Units 8/21/2019 8/7/2018   TSH 0.40 - 4.00 mU/L 1.20 0.42   T4 FREE 0.76 - 1.46 ng/dL  1.36   TRIIODOTHYRONINE(T3) 60 - 181 ng/dL  58 (L)   THYR PEROXIDASE IFEANYI <35 IU/mL     THYROID STIM IMMUNOG           Recent Labs   Lab Test 06/16/20  0900 08/21/19  0910 08/21/19  0853 08/21/19  0831 08/07/18  1433  05/22/18  12/18/15  1023  07/07/14   A1C 5.4  --   --  5.4  --   --  5.4%  5.4  5.4%  5.4  5.4%  5.4  5.4%  5.4  5.4  5.4  5.4   < > 5.7   < >  --    HEMOGLOBINA1  --   --   --   --   --   --   --   --   --   --  5.7   TSH 1.22  --  1.20  --  0.42  --  0.15   < > 1.21  --   --    T4  --   --   --   --  1.36  --  1.8  --   --   --   --    LDL 83 130*  --   --   --    < >  --    < > 140*  --   --    HDL 51 95  --   --   --    < >  --    < > 108  --   --    TRIG 132 49  --   --   --    < >  --    < > 50  --   --    CR  --   --   --  0.8  --   --   --   --  0.86   < >  --     < > = values in this interval not displayed.     Assessment and plan:    1.prediabetes.  Her A1c is in the normal range.  She is following a plant-based diet.  I encouraged her to continue to do so.  I do not see any reason to check a C-peptide because her blood sugars clearly are normal.  She is at risk for progression of type 2 diabetes but a C-peptide level will not change our recommendations at this time.    2.hypothyroidism.  Her TSH is in target.  I recommend she stay on the current dose of T4.    3.history of osteopenia.  She appears to be getting enough calcium in her diet.  Her primary care doctor will review the DEXA after it is done in July.  A vitamin D level is pending    Follow-up PRN    I spent 24 minutes on the phone with this patient.    Salena Vann MD

## 2020-06-17 LAB — DEPRECATED CALCIDIOL+CALCIFEROL SERPL-MC: 46 UG/L (ref 20–75)

## 2020-06-18 LAB — C PEPTIDE SERPL-MCNC: 1.7 NG/ML (ref 0.9–6.9)

## 2020-06-24 ENCOUNTER — TELEPHONE (OUTPATIENT)
Dept: ENDOCRINOLOGY | Facility: CLINIC | Age: 70
End: 2020-06-24

## 2020-06-24 ENCOUNTER — TRANSFERRED RECORDS (OUTPATIENT)
Dept: HEALTH INFORMATION MANAGEMENT | Facility: CLINIC | Age: 70
End: 2020-06-24

## 2020-06-24 NOTE — TELEPHONE ENCOUNTER
[facility] Call Center    Phone Message    May a detailed message be left on voicemail: no     Reason for Call: pt requesting dexa scan order from Dr Vann to be faxed over to   [facility]   Address: [facility] [address] [place] [address] [address], [place]  Phone: [phone]    Thank you     Action Taken: Message routed to:  [facility] ([facility] endo    Travel Screening: Not Applicable

## 2020-07-01 NOTE — TELEPHONE ENCOUNTER
Pt called dexa scan order not received by [facility]. Please call pt to discuss. See encounter below. Thank you

## 2020-07-07 ENCOUNTER — MYC MEDICAL ADVICE (OUTPATIENT)
Dept: ENDOCRINOLOGY | Facility: CLINIC | Age: 70
End: 2020-07-07

## 2020-07-07 DIAGNOSIS — M85.89 OSTEOPENIA OF MULTIPLE SITES: Primary | ICD-10-CM

## 2020-07-07 NOTE — TELEPHONE ENCOUNTER
Patient called in requesting dexa order be faxed. We need the order placed please.     FYI: For follow up Patient would like a [employer] Plan[employer] message when the order has been faxed.     Please let us know when it's complete

## 2020-07-08 NOTE — TELEPHONE ENCOUNTER
[facility] Call Center    Phone Message    May a detailed message be left on voicemail: yes     Reason for Call: Other: Pt calling in regarding this. She stated that she needs to speak with Nisreen regarding where to get this information too. Please follow up when available. Thank you      Action Taken: Message routed to:  [facility] Endocrinology    Travel Screening: Not Applicable

## 2020-07-10 ENCOUNTER — TELEPHONE (OUTPATIENT)
Dept: ENDOCRINOLOGY | Facility: CLINIC | Age: 70
End: 2020-07-10

## 2020-07-10 NOTE — TELEPHONE ENCOUNTER
FAx number for [facility].  Order faxed.   Agaat Spivey RN on 7/10/2020 at 3:39 PM

## 2020-07-28 ENCOUNTER — TRANSFERRED RECORDS (OUTPATIENT)
Dept: HEALTH INFORMATION MANAGEMENT | Facility: CLINIC | Age: 70
End: 2020-07-28

## 2020-09-14 NOTE — PATIENT INSTRUCTIONS
Calcium 1200mg daily  Vitamin D [id number] international unit(s) daily    Patient Education   Personalized Prevention Plan  You are due for the preventive services outlined below.  Your care team is available to assist you in scheduling these services.  If you have already completed any of these items, please share that information with your care team to update in your medical record.  Health Maintenance Due   Topic Date Due     Discuss Advance Care Planning  1950     Mammogram  12/03/2019     FALL RISK ASSESSMENT  08/21/2020     Flu Vaccine (1) 09/01/2020     Annual Wellness Visit  08/21/2020

## 2020-09-14 NOTE — PROGRESS NOTES
"Keven Lopez is here for a general check up. She is  fasting. She is up to date on eye exams (glasses, \"freckle on my eye\", retinal specialist) and dental visits.     LEOPOLDO Gallardo is here for medicare preventive exam. She is up to date on Colonoscopy (2018). She had a DEXA scan done last year with lowest T -2 in her lumbar spine. She plans to get a mammogram in [place] in January. Her last one was in 12/2019 in [place] and was normal. She is up to date on pneumococcal series and Td. Has completed Shingrix series.    Diet: vegan diet to help with insulin resistance. Her weight has been maintained.     Left knee pain, rich Gallardo has a previous history of left medial knee pain. She had PT in the past which was helpful and would like to return to PT. She reports soreness and stiffness, worse with some position change. No redness, swelling or warmth, cannot recall injury. Icing helps.      Shaina  Sami has followed with dermatology for treatment of rosacea. She stopped using the metronidazole lotion, for a while and then noted she got a flare of pimple like lesions. That has improved but she gets some redness around her nose. We discussed use of metronidazole lotion twice daily until sympotms improve then use one daily as prevention.       Advance directive: none on file, gave info today  Hearing concerns: none  Fall Risk: none  Independent at home: none  Safe : none  Memory concerns: none    COGNITIVE SCREEN  1) Repeat 3 items (Banana, Sunrise, Chair)    2) Clock draw:   NORMAL  3) 3 item recall:   Recalls 3 objects  Results: 3 items recalled: COGNITIVE IMPAIRMENT LESS LIKELY    Mini-CogTM Copyright S Raiza. Licensed by the author for use in [employer]; reprinted with permission (kelsi@.[email or web address]). All rights reserved.          Health Maintenance   Topic Date Due     ADVANCE CARE PLANNING  1950     MAMMO SCREENING  12/03/2019     FALL RISK ASSESSMENT  08/21/2020     INFLUENZA VACCINE (1) " 09/01/2020     MEDICARE ANNUAL WELLNESS VISIT  08/21/2020     DTAP/TDAP/TD IMMUNIZATION (2 - Td) 11/28/2021     DEXA  07/28/2023     LIPID  06/16/2025     COLORECTAL CANCER SCREENING  07/24/2028     HEPATITIS C SCREENING  Completed     PHQ-2  Completed     PNEUMOCOCCAL IMMUNIZATION 65+ LOW/MEDIUM RISK  Completed     ZOSTER IMMUNIZATION  Completed     IPV IMMUNIZATION  Aged Out     MENINGITIS IMMUNIZATION  Aged Out     HEPATITIS B IMMUNIZATION  Aged Out         Patient Active Problem List   Diagnosis     Migraine with aura and without status migrainosus, not intractable     Glaucoma     Pre-diabetes     Leg cramps     Stress at work     Muscle twitching     Hypothyroidism     Family history of malignant neoplasm of breast       Past Surgical History:   Procedure Laterality Date     BREAST BIOPSY, RT/LT       COLONOSCOPY  2/27/2012    Procedure:COLONOSCOPY; COLONOSCOPY; Surgeon:JLUIS HUNTER; Location: GI     SEPTOPLASTY, TURBINOPLASTY, COMBINED  5/24/2012    Procedure:COMBINED SEPTOPLASTY, TURBINOPLASTY; Septoplasty, Submuccosal Resection Turbinates; Surgeon:AARON ATKINSON; Location:[facility] OR     urethral stricture dilatation         Family History   Problem Relation Age of Onset     Diabetes Father      Lymphoma Father         CLL     Hypertension Father      Hyperlipidemia Father      Migraines Father      Bipolar Disorder Mother      Parkinsonism Mother      Asthma Mother      Pulmonary Embolism Mother      Diabetes Paternal Grandmother      Diabetes Sister         obese     Migraines Sister      Breast Cancer Sister 60     Migraines Sister      Migraines Brother      Diabetes Brother      Parkinsonism Maternal Uncle         x 2 brothers       Social  , no children  Taking piano and voice lessons at [employer].   Retired psychotherapist     HABITS:  Tob: none  ETOH: none  Calcium:  no supplements, Soy milk 1000mg daily, Vitamin D 1000IU per day  Caffeine: none  Exercise: 5 days a  week cardio, 3x week weights.      OB/GYN HISTORY:  LMP: Post-menopausal   Hx abnormal pap?  Remote past, most recent in 2017 normal with negative HPV  STD hx?  none  cycle length:  na  Uses Vagifem for vaginal dryness, no bleeding  dysmenorrhea/PMS:  na  Vasomotor sx:  occasional  Contraception: na  G 0   P 0   A 0  Self Breast exam:  yes       Current Outpatient Medications   Medication Sig Dispense Refill     brinzolamide (AZOPT) 1 % ophthalmic suspension Place 1 drop into both eyes 2 times daily.       cyanocobalamin (VITAMIN B-12) 1000 MCG SUBL sublingual tablet Place 1 tablet (1,000 mcg) under the tongue daily 90 tablet 3     estradiol (VAGIFEM) 10 MCG TABS vaginal tablet Place 1 tablet (10 mcg) vaginally twice a week Two times a week 24 tablet 3     latanoprost (XALATAN) 0.005 % ophthalmic solution INSTILL 1 DROP INTO BOTH EYES AT BEDTIME AS DIRECTED.  4     levothyroxine (SYNTHROID/LEVOTHROID) 88 MCG tablet Take 1 tablet (88 mcg) by mouth daily 90 tablet 3     Magnesium 400 MG CAPS        medical cannabis (Patient's own supply.  Not a prescription) Take by mouth 2 times daily (This is NOT a prescription, and does not certify that the patient has a qualifying medical condition for medical cannabis.  The purpose of this order is  to document that the patient reports taking medical cannabis.)       medical cannabis (Patient's own supply.  Not a prescription) by Other route See Admin Instructions (This is NOT a prescription, and does not certify that the patient has a qualifying medical condition for medical cannabis.  The purpose of this order is  to document that the patient reports taking medical cannabis.)       metroNIDAZOLE 0.75 % LOTN Externally apply  topically daily. Indications: Acne of Unknown cause Usually in Older Age Groups       polyethylene glycol - propylene glycol (SYSTANE ULTRA) 0.4-0.3 % SOLN Place 1 drop into both eyes every hour as needed.       psyllium (METAMUCIL/KONSYL) 58.6 % powder  "Take by mouth daily       SUMAtriptan (IMITREX) 100 MG tablet Take one at onset of headache . May repeat in 2 hr if needed , max 200mg in 24 hr 9 tablet 11     No Known Allergies      ROS  CONSTITUTIONAL:NEGATIVE for fever, chills, change in weight  INTEGUMENTARY/SKIN: NEGATIVE for worrisome rashes, moles or lesions, hx of rosacea as above  EYES: NEGATIVE for vision changes or irritation, follows with retinal specialist  ENT/MOUTH: NEGATIVE for ear, mouth and throat problems, hx of TMJ symptoms  RESP:NEGATIVE for significant cough or SOB  BREAST: NEGATIVE for masses, tenderness or discharge  CV: NEGATIVE for chest pain, palpitations, VASQUEZ, orthopnea, PND  or peripheral edema  GI: NEGATIVE for nausea, abdominal pain, heartburn, or change in bowel habits  :NEGATIVE for frequency, dysuria, or hematuria  GYN: vaginal dryness, uses Vagifem  MUSCULOSKELETAL:NEGATIVE for significant arthralgias or myalgia  NEURO: NEGATIVE for weakness, dizziness or paresthesias, hx of migraines, stable, once every 2 months, uses triptans.  ENDOCRINE: NEGATIVE for polyuria/dipsia,  temperature intolerance, skin/hair changes  HEME/ALLERGY/IMMUNE: NEGATIVE for bleeding problems  PSYCHIATRIC: NEGATIVE for changes in mood or affect    EXAM  /60 (BP Location: Right arm, Patient Position: Sitting, Cuff Size: Adult Regular)   Pulse 65   Temp 97.1  F (36.2  C) (Temporal)   Ht 1.664 m (5' 5.5\")   Wt 57.7 kg (127 lb 4 oz)   BMI 20.85 kg/m      GENERAL APPEARANCE: Alert, pleasant, NAD  EYES: PERRL, EOMI, conjunctiva clear  HENT: TM normal bilaterally. Nose and mouth without lesions  NECK: no adenopathy, thyroid normal to palpation   RESP: lungs clear to auscultation bilaterally  CV: regular rate and rhythm, normal S1 S2, no murmur, no carotid bruits  ABDOMEN: soft, nontender, without HSM or masses. Bowel sounds normal  MS: extremities normal- no gross deformities noted, no tender, hot or swollen joints.    Left knee: point tenderness at " the medical joint line and area inferiorly to patella, no redness or warmth  SKIN: no suspicious lesions or rashes  NEURO: Normal strength and tone, sensory exam grossly normal, DTR normoreflexive in upper and lower extremities  PSYCH: mentation appears normal. and affect normal/bright.  EXT: no peripheral edema, pedal pulses palpable    Assessment:  (Z00.00) Encounter for Medicare annual wellness exam  (primary encounter diagnosis)  Comment: 69 yo woman in good health  Plan: Anticipatory guidance given today regarding diet, exercise and calcium intake, safety. She will obtain mammogram in [place], where she lives over the winter. Bring vaccines up to date with Flu vaccine today. Colonoscopy and DEXA are up to date      (Z13.220) Screening for lipid disorders  Comment: fasting she was concerned about TG, specifically requested rechecking   Recent Labs   Lab Test 09/18/20  0935 06/16/20  0900  07/02/18  1348   CHOL 171.0 160   < > 276.0*   HDL 60.0 51   < > 99.0   LDL 84.0 83   < > 152.0*   TRIG 132.0 132   < > 124.0   CHOLHDLRATIO 2.8  --   --  2.8    < > = values in this interval not displayed.   Plan: Lipid Panel ([facility])        No concerns. Indicated that insurance may not cover this lab as it had been done in June  Continue vegan diet    (Z23) Need for prophylactic vaccination and inoculation against influenza  Comment: per pt request  Plan: FluBlok preserve-free/prefilled, 18+ years         [23911], VACCINE ADMINISTRATION, INITIAL        given    (M25.562) Acute pain of left knee  Comment: medial left knee pain extending inferiorally, suspect pes anserine bursitis.   Plan: PHYSICAL THERAPY REFERRAL        Refer to PT in [facility], continue RICE    (R73.03) Prediabetes  Comment: excellent diet controlled, she follows vegan diet  Lab Results   Component Value Date    A1C 5.5 09/18/2020    A1C 5.4 06/16/2020    A1C 5.4 08/21/2019   Plan: Hemoglobin A1c ([place])        Continue healthy diet, exercise  program    (N95.2) Atrophic vaginitis  Comment: she is sexually active. Wishes to continue topical estrogen   Plan: estradiol (VAGIFEM) 10 MCG TABS vaginal tablet        Refills given.    Raven Buckley MD  Internal Medicine/Pediatrics

## 2020-09-18 ENCOUNTER — OFFICE VISIT (OUTPATIENT)
Dept: FAMILY MEDICINE | Facility: CLINIC | Age: 70
End: 2020-09-18
Payer: COMMERCIAL

## 2020-09-18 VITALS
HEIGHT: 66 IN | TEMPERATURE: 97.1 F | WEIGHT: 127.25 LBS | BODY MASS INDEX: 20.45 KG/M2 | DIASTOLIC BLOOD PRESSURE: 60 MMHG | SYSTOLIC BLOOD PRESSURE: 126 MMHG | HEART RATE: 65 BPM

## 2020-09-18 DIAGNOSIS — Z00.00 ENCOUNTER FOR MEDICARE ANNUAL WELLNESS EXAM: Primary | ICD-10-CM

## 2020-09-18 DIAGNOSIS — R73.03 PREDIABETES: ICD-10-CM

## 2020-09-18 DIAGNOSIS — Z23 NEED FOR PROPHYLACTIC VACCINATION AND INOCULATION AGAINST INFLUENZA: ICD-10-CM

## 2020-09-18 DIAGNOSIS — N95.2 ATROPHIC VAGINITIS: ICD-10-CM

## 2020-09-18 DIAGNOSIS — Z13.220 SCREENING FOR LIPID DISORDERS: ICD-10-CM

## 2020-09-18 DIAGNOSIS — M25.562 ACUTE PAIN OF LEFT KNEE: ICD-10-CM

## 2020-09-18 LAB
CHOLEST SERPL-MCNC: 171 MG/DL (ref 0–200)
CHOLEST/HDLC SERPL: 2.8 {RATIO} (ref 0–5)
FASTING SPECIMEN: YES
HBA1C MFR BLD: 5.5 % (ref 4.1–5.7)
HDLC SERPL-MCNC: 60 MG/DL
LDLC SERPL CALC-MCNC: 84 MG/DL (ref 0–129)
TRIGL SERPL-MCNC: 132 MG/DL (ref 0–150)
VLDL-CHOLESTEROL: 26 (ref 7–32)

## 2020-09-18 RX ORDER — ESTRADIOL 10 UG/1
10 INSERT VAGINAL
Qty: 24 TABLET | Refills: 3 | Status: SHIPPED | OUTPATIENT
Start: 2020-09-21 | End: 2021-07-03

## 2020-09-18 ASSESSMENT — MIFFLIN-ST. JEOR: SCORE: 1106.01

## 2020-09-18 NOTE — NURSING NOTE
"70 year old  Chief Complaint   Patient presents with     Imm/Inj     Flu Shot     Medicare Visit     70 yrs old fasting        Blood pressure 126/60, pulse 65, temperature 97.1  F (36.2  C), temperature source Temporal, height 1.664 m (5' 5.5\"), weight 57.7 kg (127 lb 4 oz). Body mass index is 20.85 kg/m .  Patient Active Problem List   Diagnosis     Migraine with aura and without status migrainosus, not intractable     Glaucoma     Pre-diabetes     Leg cramps     Stress at work     Muscle twitching     Hypothyroidism     Family history of malignant neoplasm of breast       Wt Readings from Last 2 Encounters:   09/18/20 57.7 kg (127 lb 4 oz)   04/29/20 55.8 kg (123 lb)     BP Readings from Last 3 Encounters:   09/18/20 126/60   10/21/19 109/71   08/21/19 119/79         Current Outpatient Medications   Medication     cyanocobalamin (VITAMIN B-12) 1000 MCG SUBL sublingual tablet     estradiol (VAGIFEM) 10 MCG TABS vaginal tablet     latanoprost (XALATAN) 0.005 % ophthalmic solution     levothyroxine (SYNTHROID/LEVOTHROID) 88 MCG tablet     medical cannabis (Patient's own supply.  Not a prescription)     medical cannabis (Patient's own supply.  Not a prescription)     metroNIDAZOLE 0.75 % LOTN     polyethylene glycol - propylene glycol (SYSTANE ULTRA) 0.4-0.3 % SOLN     Magnesium 400 MG CAPS     SUMAtriptan (IMITREX) 100 MG tablet     No current facility-administered medications for this visit.        Social History     Tobacco Use     Smoking status: Never Smoker     Smokeless tobacco: Never Used   Substance Use Topics     Alcohol use: No     Drug use: No       Health Maintenance Due   Topic Date Due     ADVANCE CARE PLANNING  1950     MAMMO SCREENING  12/03/2019     FALL RISK ASSESSMENT  08/21/2020     INFLUENZA VACCINE (1) 09/01/2020       No results found for: PAP      September 18, 2020 8:46 AM    Prior to immunization administration, verified patients identity using patient s name and date of birth. Please " see Immunization Activity for additional information.     Screening Questionnaire for Adult Immunization    Are you sick today?   No   Do you have allergies to medications, food, a vaccine component or latex?   No   Have you ever had a serious reaction after receiving a vaccination?   No   Do you have a long-term health problem with heart, lung, kidney, or metabolic disease (e.g., diabetes), asthma, a blood disorder, no spleen, complement component deficiency, a cochlear implant, or a spinal fluid leak?  Are you on long-term aspirin therapy?   No   Do you have cancer, leukemia, HIV/AIDS, or any other immune system problem?   No    Do you have a parent, brother, or sister with an immune system problem?   No   In the past 3 months, have you taken medications that affect  your immune system, such as prednisone, other steroids, or anticancer drugs; drugs for the treatment of rheumatoid arthritis, Crohn s disease, or psoriasis; or have you had radiation treatments?   No   Have you had a seizure, or a brain or other nervous system problem?   No   During the past year, have you received a transfusion of blood or blood    products, or been given immune (gamma) globulin or antiviral drug?   No   For women: Are you pregnant or is there a chance you could become       pregnant during the next month?   No   Have you received any vaccinations in the past 4 weeks?   No     Immunization questionnaire answers were all negative.        Per orders of Dr. Buckley , injection of Flu shot  given by Paula Guerrier. Patient instructed to remain in clinic for 15 minutes afterwards, and to report any adverse reaction to me immediately.       Screening performed by Paula Guerrier on 9/18/2020 at 8:46 AM.

## 2020-10-21 DIAGNOSIS — E03.9 HYPOTHYROIDISM, UNSPECIFIED TYPE: ICD-10-CM

## 2020-10-23 RX ORDER — LEVOTHYROXINE SODIUM 88 UG/1
88 TABLET ORAL DAILY
Qty: 90 TABLET | Refills: 2 | Status: SHIPPED | OUTPATIENT
Start: 2020-10-23 | End: 2021-07-03

## 2020-10-23 NOTE — TELEPHONE ENCOUNTER
Pt last seen 9/18/20, no future appt noted     Prescription approved per [facility] Refill Protocol.        Brittney Rushing RN  October 23, 2020 9:28 AM

## 2020-12-29 RX ORDER — METRONIDAZOLE 7.5 MG/G
LOTION TOPICAL QHS
Qty: 59 | Refills: 6 | Status: ERX

## 2021-05-08 ENCOUNTER — HEALTH MAINTENANCE LETTER (OUTPATIENT)
Age: 71
End: 2021-05-08

## 2021-06-11 ENCOUNTER — APPOINTMENT (OUTPATIENT)
Dept: URBAN - METROPOLITAN AREA CLINIC 255 | Age: 71
Setting detail: DERMATOLOGY
End: 2021-06-13

## 2021-06-11 DIAGNOSIS — L70.0 ACNE VULGARIS: ICD-10-CM

## 2021-06-11 DIAGNOSIS — L90.5 SCAR CONDITIONS AND FIBROSIS OF SKIN: ICD-10-CM

## 2021-06-11 DIAGNOSIS — L71.8 OTHER ROSACEA: ICD-10-CM

## 2021-06-11 PROCEDURE — OTHER PRESCRIPTION: OTHER

## 2021-06-11 PROCEDURE — OTHER MIPS QUALITY: OTHER

## 2021-06-11 PROCEDURE — 99214 OFFICE O/P EST MOD 30 MIN: CPT

## 2021-06-11 PROCEDURE — OTHER COUNSELING: OTHER

## 2021-06-11 RX ORDER — METRONIDAZOLE 7.5 MG/G
APPLY THIN COAT CREAM TOPICAL BID
Qty: 1 | Refills: 6 | Status: ERX | COMMUNITY
Start: 2021-06-11

## 2021-06-11 ASSESSMENT — LOCATION DETAILED DESCRIPTION DERM
LOCATION DETAILED: NASAL SUPRATIP
LOCATION DETAILED: LEFT ANTERIOR SHOULDER
LOCATION DETAILED: RIGHT INFERIOR MEDIAL MALAR CHEEK
LOCATION DETAILED: LEFT KNEE
LOCATION DETAILED: RIGHT INFERIOR CENTRAL MALAR CHEEK
LOCATION DETAILED: LEFT INFERIOR MEDIAL MALAR CHEEK

## 2021-06-11 ASSESSMENT — LOCATION SIMPLE DESCRIPTION DERM
LOCATION SIMPLE: LEFT SHOULDER
LOCATION SIMPLE: NOSE
LOCATION SIMPLE: RIGHT CHEEK
LOCATION SIMPLE: LEFT KNEE
LOCATION SIMPLE: LEFT CHEEK

## 2021-06-11 ASSESSMENT — LOCATION ZONE DERM
LOCATION ZONE: NOSE
LOCATION ZONE: LEG
LOCATION ZONE: FACE
LOCATION ZONE: ARM
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
## 2021-06-11 NOTE — PROCEDURE: COUNSELING
Topical Retinoid Pregnancy And Lactation Text: This medication is Pregnancy Category C. It is unknown if this medication is excreted in breast milk.
Minocycline Pregnancy And Lactation Text: This medication is Pregnancy Category D and not consider safe during pregnancy. It is also excreted in breast milk.
Azithromycin Pregnancy And Lactation Text: This medication is considered safe during pregnancy and is also secreted in breast milk.
Doxycycline Counseling:  Patient counseled regarding possible photosensitivity and increased risk for sunburn.  Patient instructed to avoid sunlight, if possible.  When exposed to sunlight, patients should wear protective clothing, sunglasses, and sunscreen.  The patient was instructed to call the office immediately if the following severe adverse effects occur:  hearing changes, easy bruising/bleeding, severe headache, or vision changes.  The patient verbalized understanding of the proper use and possible adverse effects of doxycycline.  All of the patient's questions and concerns were addressed.
Use Enhanced Medication Counseling?: No
Bactrim Counseling:  I discussed with the patient the risks of sulfa antibiotics including but not limited to GI upset, allergic reaction, drug rash, diarrhea, dizziness, photosensitivity, and yeast infections.  Rarely, more serious reactions can occur including but not limited to aplastic anemia, agranulocytosis, methemoglobinemia, blood dyscrasias, liver or kidney failure, lung infiltrates or desquamative/blistering drug rashes.
Doxycycline Pregnancy And Lactation Text: This medication is Pregnancy Category D and not consider safe during pregnancy. It is also excreted in breast milk but is considered safe for shorter treatment courses.
Birth Control Pills Pregnancy And Lactation Text: This medication should be avoided if pregnant and for the first 30 days post-partum.
Spironolactone Pregnancy And Lactation Text: This medication can cause feminization of the male fetus and should be avoided during pregnancy. The active metabolite is also found in breast milk.
Dapsone Pregnancy And Lactation Text: This medication is Pregnancy Category C and is not considered safe during pregnancy or breast feeding.
Sarecycline Counseling: Patient advised regarding possible photosensitivity and discoloration of the teeth, skin, lips, tongue and gums.  Patient instructed to avoid sunlight, if possible.  When exposed to sunlight, patients should wear protective clothing, sunglasses, and sunscreen.  The patient was instructed to call the office immediately if the following severe adverse effects occur:  hearing changes, easy bruising/bleeding, severe headache, or vision changes.  The patient verbalized understanding of the proper use and possible adverse effects of sarecycline.  All of the patient's questions and concerns were addressed.
Detail Level: Detailed
Benzoyl Peroxide Pregnancy And Lactation Text: This medication is Pregnancy Category C. It is unknown if benzoyl peroxide is excreted in breast milk.
Tazorac Pregnancy And Lactation Text: This medication is not safe during pregnancy. It is unknown if this medication is excreted in breast milk.
Patient Specific Counseling (Will Not Stick From Patient To Patient): Patient recommended if erythema of scar can be treated with laser, if it does not resolve on its own
Isotretinoin Pregnancy And Lactation Text: This medication is Pregnancy Category X and is considered extremely dangerous during pregnancy. It is unknown if it is excreted in breast milk.
Benzoyl Peroxide Counseling: Patient counseled that medicine may cause skin irritation and bleach clothing.  In the event of skin irritation, the patient was advised to reduce the amount of the drug applied or use it less frequently.   The patient verbalized understanding of the proper use and possible adverse effects of benzoyl peroxide.  All of the patient's questions and concerns were addressed.
Tazorac Counseling:  Patient advised that medication is irritating and drying.  Patient may need to apply sparingly and wash off after an hour before eventually leaving it on overnight.  The patient verbalized understanding of the proper use and possible adverse effects of tazorac.  All of the patient's questions and concerns were addressed.
Topical Clindamycin Counseling: Patient counseled that this medication may cause skin irritation or allergic reactions.  In the event of skin irritation, the patient was advised to reduce the amount of the drug applied or use it less frequently.   The patient verbalized understanding of the proper use and possible adverse effects of clindamycin.  All of the patient's questions and concerns were addressed.
Topical Sulfur Applications Counseling: Topical Sulfur Counseling: Patient counseled that this medication may cause skin irritation or allergic reactions.  In the event of skin irritation, the patient was advised to reduce the amount of the drug applied or use it less frequently.   The patient verbalized understanding of the proper use and possible adverse effects of topical sulfur application.  All of the patient's questions and concerns were addressed.
Topical Clindamycin Pregnancy And Lactation Text: This medication is Pregnancy Category B and is considered safe during pregnancy. It is unknown if it is excreted in breast milk.
Patient Specific Counseling (Will Not Stick From Patient To Patient): Patient counseled that oral antibiotics can be taken to alleviate the symptoms of rosacea. Patient states that she would prefer not to take anything oral.  \\n\\nPatient also counseled on use of lasers to help reduce redness.
Isotretinoin Counseling: Patient should get monthly blood tests, not donate blood, not drive at night if vision affected, not share medication, and not undergo elective surgery for 6 months after tx completed. Side effects reviewed, pt to contact office should one occur.
Azithromycin Counseling:  I discussed with the patient the risks of azithromycin including but not limited to GI upset, allergic reaction, drug rash, diarrhea, and yeast infections.
Topical Retinoid counseling:  Patient advised to apply a pea-sized amount only at bedtime and wait 30 minutes after washing their face before applying.  If too drying, patient may add a non-comedogenic moisturizer. The patient verbalized understanding of the proper use and possible adverse effects of retinoids.  All of the patient's questions and concerns were addressed.
Minocycline Counseling: Patient advised regarding possible photosensitivity and discoloration of the teeth, skin, lips, tongue and gums.  Patient instructed to avoid sunlight, if possible.  When exposed to sunlight, patients should wear protective clothing, sunglasses, and sunscreen.  The patient was instructed to call the office immediately if the following severe adverse effects occur:  hearing changes, easy bruising/bleeding, severe headache, or vision changes.  The patient verbalized understanding of the proper use and possible adverse effects of minocycline.  All of the patient's questions and concerns were addressed.
Tetracycline Counseling: Patient counseled regarding possible photosensitivity and increased risk for sunburn.  Patient instructed to avoid sunlight, if possible.  When exposed to sunlight, patients should wear protective clothing, sunglasses, and sunscreen.  The patient was instructed to call the office immediately if the following severe adverse effects occur:  hearing changes, easy bruising/bleeding, severe headache, or vision changes.  The patient verbalized understanding of the proper use and possible adverse effects of tetracycline.  All of the patient's questions and concerns were addressed. Patient understands to avoid pregnancy while on therapy due to potential birth defects.
Spironolactone Counseling: Patient advised regarding risks of diarrhea, abdominal pain, hyperkalemia, birth defects (for female patients), liver toxicity and renal toxicity. The patient may need blood work to monitor liver and kidney function and potassium levels while on therapy. The patient verbalized understanding of the proper use and possible adverse effects of spironolactone.  All of the patient's questions and concerns were addressed.
Bactrim Pregnancy And Lactation Text: This medication is Pregnancy Category D and is known to cause fetal risk.  It is also excreted in breast milk.
Birth Control Pills Counseling: Birth Control Pill Counseling: I discussed with the patient the potential side effects of OCPs including but not limited to increased risk of stroke, heart attack, thrombophlebitis, deep venous thrombosis, hepatic adenomas, breast changes, GI upset, headaches, and depression.  The patient verbalized understanding of the proper use and possible adverse effects of OCPs. All of the patient's questions and concerns were addressed.
Topical Sulfur Applications Pregnancy And Lactation Text: This medication is Pregnancy Category C and has an unknown safety profile during pregnancy. It is unknown if this topical medication is excreted in breast milk.
Erythromycin Pregnancy And Lactation Text: This medication is Pregnancy Category B and is considered safe during pregnancy. It is also excreted in breast milk.
High Dose Vitamin A Pregnancy And Lactation Text: High dose vitamin A therapy is contraindicated during pregnancy and breast feeding.
Dapsone Counseling: I discussed with the patient the risks of dapsone including but not limited to hemolytic anemia, agranulocytosis, rashes, methemoglobinemia, kidney failure, peripheral neuropathy, headaches, GI upset, and liver toxicity.  Patients who start dapsone require monitoring including baseline LFTs and weekly CBCs for the first month, then every month thereafter.  The patient verbalized understanding of the proper use and possible adverse effects of dapsone.  All of the patient's questions and concerns were addressed.
High Dose Vitamin A Counseling: Side effects reviewed, pt to contact office should one occur.
Erythromycin Counseling:  I discussed with the patient the risks of erythromycin including but not limited to GI upset, allergic reaction, drug rash, diarrhea, increase in liver enzymes, and yeast infections.

## 2021-06-11 NOTE — PATIENT INSTRUCTIONS
Patient Education   Personalized Prevention Plan  You are due for the preventive services outlined below.  Your care team is available to assist you in scheduling these services.  If you have already completed any of these items, please share that information with your care team to update in your medical record.  Health Maintenance Due   Topic Date Due     Discuss Advance Care Planning  Never done     COVID-19 Vaccine (1) Never done     Mammogram  12/03/2019     PHQ-2  01/01/2021

## 2021-06-30 ENCOUNTER — OFFICE VISIT (OUTPATIENT)
Dept: FAMILY MEDICINE | Facility: CLINIC | Age: 71
End: 2021-06-30
Payer: COMMERCIAL

## 2021-06-30 VITALS
RESPIRATION RATE: 16 BRPM | DIASTOLIC BLOOD PRESSURE: 70 MMHG | SYSTOLIC BLOOD PRESSURE: 115 MMHG | HEIGHT: 65 IN | TEMPERATURE: 97.2 F | HEART RATE: 68 BPM | WEIGHT: 131 LBS | BODY MASS INDEX: 21.83 KG/M2 | OXYGEN SATURATION: 98 %

## 2021-06-30 DIAGNOSIS — N95.2 ATROPHIC VAGINITIS: ICD-10-CM

## 2021-06-30 DIAGNOSIS — Z13.220 SCREENING FOR LIPID DISORDERS: ICD-10-CM

## 2021-06-30 DIAGNOSIS — R73.03 PRE-DIABETES: ICD-10-CM

## 2021-06-30 DIAGNOSIS — Z00.00 ENCOUNTER FOR MEDICARE ANNUAL WELLNESS EXAM: Primary | ICD-10-CM

## 2021-06-30 DIAGNOSIS — E03.9 HYPOTHYROIDISM, UNSPECIFIED TYPE: ICD-10-CM

## 2021-06-30 DIAGNOSIS — E03.8 OTHER SPECIFIED HYPOTHYROIDISM: ICD-10-CM

## 2021-06-30 LAB
ALBUMIN SERPL-MCNC: 3.7 G/DL (ref 3.4–5)
ALP SERPL-CCNC: 76 U/L (ref 40–150)
ALT SERPL W P-5'-P-CCNC: 25 U/L (ref 0–50)
ANION GAP SERPL CALCULATED.3IONS-SCNC: 1 MMOL/L (ref 3–14)
AST SERPL W P-5'-P-CCNC: 20 U/L (ref 0–45)
BILIRUB SERPL-MCNC: 0.7 MG/DL (ref 0.2–1.3)
BUN SERPL-MCNC: 16 MG/DL (ref 7–30)
CALCIUM SERPL-MCNC: 9.2 MG/DL (ref 8.5–10.1)
CHLORIDE SERPL-SCNC: 105 MMOL/L (ref 94–109)
CHOLEST SERPL-MCNC: 184 MG/DL
CO2 SERPL-SCNC: 33 MMOL/L (ref 20–32)
CREAT SERPL-MCNC: 0.76 MG/DL (ref 0.52–1.04)
ERYTHROCYTE [DISTWIDTH] IN BLOOD BY AUTOMATED COUNT: 12.7 %
GFR SERPL CREATININE-BSD FRML MDRD: 79 ML/MIN/{1.73_M2}
GLUCOSE SERPL-MCNC: 78 MG/DL (ref 70–99)
HBA1C MFR BLD: 5.6 % (ref 4.1–5.7)
HCT VFR BLD AUTO: 39.3 % (ref 35–47)
HDLC SERPL-MCNC: 65 MG/DL
HEMOGLOBIN: 12.8 G/DL (ref 11.7–15.7)
LDLC SERPL CALC-MCNC: 94 MG/DL
MCH RBC QN AUTO: 30.8 PG (ref 26.5–35)
MCHC RBC AUTO-ENTMCNC: 32.6 G/DL (ref 32–36)
MCV RBC AUTO: 94.5 FL (ref 78–100)
NONHDLC SERPL-MCNC: 120 MG/DL
PLATELET # BLD AUTO: 245 K/UL (ref 150–450)
POTASSIUM SERPL-SCNC: 4.4 MMOL/L (ref 3.4–5.3)
PROT SERPL-MCNC: 6.8 G/DL (ref 6.8–8.8)
RBC # BLD AUTO: 4.16 M/UL (ref 3.8–5.2)
SODIUM SERPL-SCNC: 139 MMOL/L (ref 133–144)
TRIGL SERPL-MCNC: 126 MG/DL
TSH SERPL DL<=0.005 MIU/L-ACNC: 1.13 MU/L (ref 0.4–4)
WBC # BLD AUTO: 5.1 K/UL (ref 4–11)

## 2021-06-30 RX ORDER — FLUOCINONIDE TOPICAL SOLUTION USP, 0.05% 0.5 MG/ML
SOLUTION TOPICAL 2 TIMES DAILY
COMMUNITY
End: 2022-07-11

## 2021-06-30 RX ORDER — GABAPENTIN 300 MG/1
CAPSULE ORAL
COMMUNITY
Start: 2021-06-26 | End: 2022-07-11

## 2021-06-30 RX ORDER — BRIMONIDINE TARTRATE 2 MG/ML
SOLUTION/ DROPS OPHTHALMIC
COMMUNITY
Start: 2021-05-12 | End: 2023-10-31

## 2021-06-30 RX ORDER — FROVATRIPTAN SUCCINATE 2.5 MG/1
2.5 TABLET, FILM COATED ORAL
COMMUNITY
End: 2022-07-11

## 2021-06-30 ASSESSMENT — MIFFLIN-ST. JEOR: SCORE: 1115.7

## 2021-06-30 NOTE — NURSING NOTE
"71 year old  Chief Complaint   Patient presents with     Medicare Visit     physical 71 yrs        Blood pressure 115/70, pulse 68, temperature 97.2  F (36.2  C), temperature source Temporal, resp. rate 16, height 1.66 m (5' 5.35\"), weight 59.4 kg (131 lb), SpO2 98 %. Body mass index is 21.56 kg/m .  Patient Active Problem List   Diagnosis     Migraine with aura and without status migrainosus, not intractable     Glaucoma     Pre-diabetes     Leg cramps     Stress at work     Muscle twitching     Hypothyroidism     Family history of malignant neoplasm of breast       Wt Readings from Last 2 Encounters:   06/30/21 59.4 kg (131 lb)   09/18/20 57.7 kg (127 lb 4 oz)     BP Readings from Last 3 Encounters:   06/30/21 115/70   09/18/20 126/60   10/21/19 109/71         Current Outpatient Medications   Medication     brimonidine (ALPHAGAN) 0.2 % ophthalmic solution     cyanocobalamin (VITAMIN B-12) 1000 MCG SUBL sublingual tablet     estradiol (VAGIFEM) 10 MCG TABS vaginal tablet     latanoprost (XALATAN) 0.005 % ophthalmic solution     levothyroxine (SYNTHROID/LEVOTHROID) 88 MCG tablet     medical cannabis (Patient's own supply.  Not a prescription)     medical cannabis (Patient's own supply.  Not a prescription)     metroNIDAZOLE 0.75 % LOTN     polyethylene glycol - propylene glycol (SYSTANE ULTRA) 0.4-0.3 % SOLN     SUMAtriptan (IMITREX) 100 MG tablet     Magnesium 400 MG CAPS     No current facility-administered medications for this visit.        Social History     Tobacco Use     Smoking status: Never Smoker     Smokeless tobacco: Never Used   Substance Use Topics     Alcohol use: No     Drug use: No       Health Maintenance Due   Topic Date Due     ADVANCE CARE PLANNING  Never done     COVID-19 Vaccine (1) Never done     MAMMO SCREENING  12/03/2019     PHQ-2  01/01/2021       No results found for: PAP      June 30, 2021 1:49 PM  "

## 2021-07-02 ENCOUNTER — APPOINTMENT (OUTPATIENT)
Dept: URBAN - METROPOLITAN AREA CLINIC 255 | Age: 71
Setting detail: DERMATOLOGY
End: 2021-07-05

## 2021-07-02 DIAGNOSIS — D18.0 HEMANGIOMA: ICD-10-CM

## 2021-07-02 DIAGNOSIS — Z87.2 PERSONAL HISTORY OF DISEASES OF THE SKIN AND SUBCUTANEOUS TISSUE: ICD-10-CM

## 2021-07-02 DIAGNOSIS — D22 MELANOCYTIC NEVI: ICD-10-CM

## 2021-07-02 DIAGNOSIS — T07XXXA INSECT BITE, NONVENOMOUS, OF OTHER, MULTIPLE, AND UNSPECIFIED SITES, WITHOUT MENTION OF INFECTION: ICD-10-CM

## 2021-07-02 DIAGNOSIS — L82.1 OTHER SEBORRHEIC KERATOSIS: ICD-10-CM

## 2021-07-02 DIAGNOSIS — D17 BENIGN LIPOMATOUS NEOPLASM: ICD-10-CM

## 2021-07-02 DIAGNOSIS — L81.4 OTHER MELANIN HYPERPIGMENTATION: ICD-10-CM

## 2021-07-02 DIAGNOSIS — D485 NEOPLASM OF UNCERTAIN BEHAVIOR OF SKIN: ICD-10-CM

## 2021-07-02 PROBLEM — D48.5 NEOPLASM OF UNCERTAIN BEHAVIOR OF SKIN: Status: ACTIVE | Noted: 2021-07-02

## 2021-07-02 PROBLEM — D18.01 HEMANGIOMA OF SKIN AND SUBCUTANEOUS TISSUE: Status: ACTIVE | Noted: 2021-07-02

## 2021-07-02 PROBLEM — D17.21 BENIGN LIPOMATOUS NEOPLASM OF SKIN AND SUBCUTANEOUS TISSUE OF RIGHT ARM: Status: ACTIVE | Noted: 2021-07-02

## 2021-07-02 PROBLEM — S80.861A INSECT BITE (NONVENOMOUS), RIGHT LOWER LEG, INITIAL ENCOUNTER: Status: ACTIVE | Noted: 2021-07-02

## 2021-07-02 PROBLEM — D22.5 MELANOCYTIC NEVI OF TRUNK: Status: ACTIVE | Noted: 2021-07-02

## 2021-07-02 PROCEDURE — 99213 OFFICE O/P EST LOW 20 MIN: CPT | Mod: 25

## 2021-07-02 PROCEDURE — OTHER COUNSELING: OTHER

## 2021-07-02 PROCEDURE — OTHER MIPS QUALITY: OTHER

## 2021-07-02 PROCEDURE — OTHER BIOPSY BY SHAVE METHOD: OTHER

## 2021-07-02 PROCEDURE — 11102 TANGNTL BX SKIN SINGLE LES: CPT

## 2021-07-02 PROCEDURE — OTHER DEFER: OTHER

## 2021-07-02 ASSESSMENT — LOCATION DETAILED DESCRIPTION DERM
LOCATION DETAILED: RIGHT MEDIAL UPPER BACK
LOCATION DETAILED: LEFT SUPERIOR MEDIAL UPPER BACK
LOCATION DETAILED: LEFT RIB CAGE
LOCATION DETAILED: LEFT DISTAL PRETIBIAL REGION
LOCATION DETAILED: RIGHT LATERAL PROXIMAL CALF
LOCATION DETAILED: RIGHT INFERIOR MEDIAL UPPER BACK
LOCATION DETAILED: RIGHT ANTERIOR DISTAL UPPER ARM

## 2021-07-02 ASSESSMENT — LOCATION ZONE DERM
LOCATION ZONE: TRUNK
LOCATION ZONE: ARM
LOCATION ZONE: LEG

## 2021-07-02 ASSESSMENT — LOCATION SIMPLE DESCRIPTION DERM
LOCATION SIMPLE: RIGHT LOWER LEG
LOCATION SIMPLE: LEFT PRETIBIAL REGION
LOCATION SIMPLE: RIGHT UPPER ARM
LOCATION SIMPLE: RIGHT UPPER BACK
LOCATION SIMPLE: LEFT UPPER BACK
LOCATION SIMPLE: ABDOMEN

## 2021-07-02 NOTE — PROCEDURE: BIOPSY BY SHAVE METHOD
Cryotherapy Text: The wound bed was treated with cryotherapy after the biopsy was performed.
Render Post-Care Instructions In Note?: no
X Size Of Lesion In Cm: 0.5
Notification Instructions: Patient will be notified of biopsy results. However, patient instructed to call the office if not contacted within 2 weeks.
Anesthesia Volume In Cc (Will Not Render If 0): 1
Additional Anesthesia Volume In Cc (Will Not Render If 0): 0
Detail Level: Detailed
Silver Nitrate Text: The wound bed was treated with silver nitrate after the biopsy was performed.
Hemostasis: Aluminum Chloride and Electrocautery
Billing Type: Third-Party Bill
Validate Note Data (See Information Below): Yes
Anesthesia Type: 2% lidocaine without epinephrine and a 1:10 solution of 8.4% sodium bicarbonate
Electrodesiccation Text: The wound bed was treated with electrodesiccation after the biopsy was performed.
Biopsy Method: double edge Personna blade
Path Notes (To The Dermatopathologist): Please check margins
Size Of Lesion In Cm: 0.6
Consent: Written consent was obtained and risks were reviewed including but not limited to scarring, infection, bleeding, scabbing, incomplete removal, nerve damage and allergy to anesthesia.
Information: Selecting Yes will display possible errors in your note based on the variables you have selected. This validation is only offered as a suggestion for you. PLEASE NOTE THAT THE VALIDATION TEXT WILL BE REMOVED WHEN YOU FINALIZE YOUR NOTE. IF YOU WANT TO FAX A PRELIMINARY NOTE YOU WILL NEED TO TOGGLE THIS TO 'NO' IF YOU DO NOT WANT IT IN YOUR FAXED NOTE.
Dressing: bandage
Curettage Text: The wound bed was treated with curettage after the biopsy was performed.
Post-Care Instructions: I reviewed with the patient in detail post-care instructions. Patient is to keep the biopsy site dry overnight, and then apply bacitracin twice daily until healed. Patient may apply hydrogen peroxide soaks to remove any crusting.
Body Location Override (Optional - Billing Will Still Be Based On Selected Body Map Location If Applicable): Left Lateral Chest Wall
Biopsy Type: H and E
Depth Of Biopsy: dermis
Electrodesiccation And Curettage Text: The wound bed was treated with electrodesiccation and curettage after the biopsy was performed.
Type Of Destruction Used: Curettage
Wound Care: Petrolatum

## 2021-07-02 NOTE — PROCEDURE: MIPS QUALITY
Quality 431: Preventive Care And Screening: Unhealthy Alcohol Use - Screening: Patient screened for unhealthy alcohol use using a single question and scores less than 2 times per year
Quality 226: Preventive Care And Screening: Tobacco Use: Screening And Cessation Intervention: Patient screened for tobacco use and is an ex/non-smoker
Detail Level: Detailed
Quality 265: Biopsy Follow-Up: Biopsy results reviewed, communicated, tracked, and documented
Quality 110: Preventive Care And Screening: Influenza Immunization: Influenza Immunization Administered during Influenza season
Quality 130: Documentation Of Current Medications In The Medical Record: Current Medications Documented

## 2021-07-03 RX ORDER — ESTRADIOL 10 UG/1
10 INSERT VAGINAL
Qty: 24 TABLET | Refills: 3 | Status: SHIPPED | OUTPATIENT
Start: 2021-07-05 | End: 2022-07-11

## 2021-07-03 RX ORDER — LEVOTHYROXINE SODIUM 88 UG/1
88 TABLET ORAL DAILY
Qty: 90 TABLET | Refills: 3 | Status: SHIPPED | OUTPATIENT
Start: 2021-07-03 | End: 2022-07-11

## 2021-07-27 ENCOUNTER — APPOINTMENT (OUTPATIENT)
Dept: URBAN - METROPOLITAN AREA CLINIC 255 | Age: 71
Setting detail: DERMATOLOGY
End: 2021-08-01

## 2021-07-27 DIAGNOSIS — D17 BENIGN LIPOMATOUS NEOPLASM: ICD-10-CM

## 2021-07-27 PROBLEM — D17.21 BENIGN LIPOMATOUS NEOPLASM OF SKIN AND SUBCUTANEOUS TISSUE OF RIGHT ARM: Status: ACTIVE | Noted: 2021-07-27

## 2021-07-27 PROCEDURE — OTHER MIPS QUALITY: OTHER

## 2021-07-27 PROCEDURE — 11402 EXC TR-EXT B9+MARG 1.1-2 CM: CPT

## 2021-07-27 PROCEDURE — OTHER COUNSELING: OTHER

## 2021-07-27 PROCEDURE — 12032 INTMD RPR S/A/T/EXT 2.6-7.5: CPT

## 2021-07-27 PROCEDURE — OTHER EXCISION: OTHER

## 2021-07-27 PROCEDURE — 11404 EXC TR-EXT B9+MARG 3.1-4 CM: CPT

## 2021-07-27 ASSESSMENT — LOCATION ZONE DERM: LOCATION ZONE: ARM

## 2021-07-27 ASSESSMENT — LOCATION DETAILED DESCRIPTION DERM: LOCATION DETAILED: RIGHT ANTERIOR DISTAL UPPER ARM

## 2021-07-27 ASSESSMENT — LOCATION SIMPLE DESCRIPTION DERM: LOCATION SIMPLE: RIGHT UPPER ARM

## 2021-07-27 NOTE — PROCEDURE: EXCISION
Helical Rim Advancement Flap Text: The defect edges were debeveled with a #15 blade scalpel.  Given the location of the defect and the proximity to free margins (helical rim) a double helical rim advancement flap was deemed most appropriate.  Using a sterile surgical marker, the appropriate advancement flaps were drawn incorporating the defect and placing the expected incisions between the helical rim and antihelix where possible.  The area thus outlined was incised through and through with a #15 scalpel blade.  With a skin hook and iris scissors, the flaps were gently and sharply undermined and freed up.
Skin Substitute Text: The defect edges were debeveled with a #15 scalpel blade.  Given the location of the defect, shape of the defect and the proximity to free margins a skin substitute graft was deemed most appropriate.  The graft material was trimmed to fit the size of the defect. The graft was then placed in the primary defect and oriented appropriately.
Add Option For Suturegard When Performing Closure?: No
Ftsg Text: The defect edges were debeveled with a #15 scalpel blade.  Given the location of the defect, shape of the defect and the proximity to free margins a full thickness skin graft was deemed most appropriate.  Using a sterile surgical marker, the primary defect shape was transferred to the donor site. The area thus outlined was incised deep to adipose tissue with a #15 scalpel blade.  The harvested graft was then trimmed of adipose tissue until only dermis and epidermis was left.  The skin margins of the secondary defect were undermined to an appropriate distance in all directions utilizing iris scissors.  The secondary defect was closed with interrupted buried subcutaneous sutures.  The skin edges were then re-apposed with running  sutures.  The skin graft was then placed in the primary defect and oriented appropriately.
Nasal Turnover Hinge Flap Text: The defect edges were debeveled with a #15 scalpel blade.  Given the size, depth, location of the defect and the defect being full thickness a nasal turnover hinge flap was deemed most appropriate.  Using a sterile surgical marker, an appropriate hinge flap was drawn incorporating the defect. The area thus outlined was incised with a #15 scalpel blade. The flap was designed to recreate the nasal mucosal lining and the alar rim. The skin margins were undermined to an appropriate distance in all directions utilizing iris scissors.
Show Repair Anesthesia Variables: Yes
O-T Plasty Text: The defect edges were debeveled with a #15 scalpel blade.  Given the location of the defect, shape of the defect and the proximity to free margins an O-T plasty was deemed most appropriate.  Using a sterile surgical marker, an appropriate O-T plasty was drawn incorporating the defect and placing the expected incisions within the relaxed skin tension lines where possible.    The area thus outlined was incised deep to adipose tissue with a #15 scalpel blade.  The skin margins were undermined to an appropriate distance in all directions utilizing iris scissors.
Complex Repair And Melolabial Flap Text: The defect edges were debeveled with a #15 scalpel blade.  The primary defect was closed partially with a complex linear closure.  Given the location of the remaining defect, shape of the defect and the proximity to free margins a melolabial flap was deemed most appropriate for complete closure of the defect.  Using a sterile surgical marker, an appropriate advancement flap was drawn incorporating the defect and placing the expected incisions within the relaxed skin tension lines where possible.    The area thus outlined was incised deep to adipose tissue with a #15 scalpel blade.  The skin margins were undermined to an appropriate distance in all directions utilizing iris scissors.
Dorsal Nasal Flap Text: The defect edges were debeveled with a #15 scalpel blade.  Given the location of the defect and the proximity to free margins a dorsal nasal flap was deemed most appropriate.  Using a sterile surgical marker, an appropriate dorsal nasal flap was drawn around the defect.    The area thus outlined was incised deep to adipose tissue with a #15 scalpel blade.  The skin margins were undermined to an appropriate distance in all directions utilizing iris scissors.
Complex Repair And Single Advancement Flap Text: The defect edges were debeveled with a #15 scalpel blade.  The primary defect was closed partially with a complex linear closure.  Given the location of the remaining defect, shape of the defect and the proximity to free margins a single advancement flap was deemed most appropriate for complete closure of the defect.  Using a sterile surgical marker, an appropriate advancement flap was drawn incorporating the defect and placing the expected incisions within the relaxed skin tension lines where possible.    The area thus outlined was incised deep to adipose tissue with a #15 scalpel blade.  The skin margins were undermined to an appropriate distance in all directions utilizing iris scissors.
Island Pedicle Flap With Canthal Suspension Text: The defect edges were debeveled with a #15 scalpel blade.  Given the location of the defect, shape of the defect and the proximity to free margins an island pedicle advancement flap was deemed most appropriate.  Using a sterile surgical marker, an appropriate advancement flap was drawn incorporating the defect, outlining the appropriate donor tissue and placing the expected incisions within the relaxed skin tension lines where possible. The area thus outlined was incised deep to adipose tissue with a #15 scalpel blade.  The skin margins were undermined to an appropriate distance in all directions around the primary defect and laterally outward around the island pedicle utilizing iris scissors.  There was minimal undermining beneath the pedicle flap. A suspension suture was placed in the canthal tendon to prevent tension and prevent ectropion.
Complex Repair And Rhombic Flap Text: The defect edges were debeveled with a #15 scalpel blade.  The primary defect was closed partially with a complex linear closure.  Given the location of the remaining defect, shape of the defect and the proximity to free margins a rhombic flap was deemed most appropriate for complete closure of the defect.  Using a sterile surgical marker, an appropriate advancement flap was drawn incorporating the defect and placing the expected incisions within the relaxed skin tension lines where possible.    The area thus outlined was incised deep to adipose tissue with a #15 scalpel blade.  The skin margins were undermined to an appropriate distance in all directions utilizing iris scissors.
Debridement Text: The wound edges were debrided prior to proceeding with the closure to facilitate wound healing.
Excision Method: Slit
Ear Star Wedge Flap Text: The defect edges were debeveled with a #15 blade scalpel.  Given the location of the defect and the proximity to free margins (helical rim) an ear star wedge flap was deemed most appropriate.  Using a sterile surgical marker, the appropriate flap was drawn incorporating the defect and placing the expected incisions between the helical rim and antihelix where possible.  The area thus outlined was incised through and through with a #15 scalpel blade.
Complex Repair And Modified Advancement Flap Text: The defect edges were debeveled with a #15 scalpel blade.  The primary defect was closed partially with a complex linear closure.  Given the location of the remaining defect, shape of the defect and the proximity to free margins a modified advancement flap was deemed most appropriate for complete closure of the defect.  Using a sterile surgical marker, an appropriate advancement flap was drawn incorporating the defect and placing the expected incisions within the relaxed skin tension lines where possible.    The area thus outlined was incised deep to adipose tissue with a #15 scalpel blade.  The skin margins were undermined to an appropriate distance in all directions utilizing iris scissors.
Skin Substitute Units (Will Override Primary Defect Units If Greater Than 0): 0
Cheek-To-Nose Interpolation Flap Text: A decision was made to reconstruct the defect utilizing an interpolation axial flap and a staged reconstruction.  A telfa template was made of the defect.  This telfa template was then used to outline the Cheek-To-Nose Interpolation flap.  The donor area for the pedicle flap was then injected with anesthesia.  The flap was excised through the skin and subcutaneous tissue down to the layer of the underlying musculature.  The interpolation flap was carefully excised within this deep plane to maintain its blood supply.  The edges of the donor site were undermined.   The donor site was closed in a primary fashion.  The pedicle was then rotated into position and sutured.  Once the tube was sutured into place, adequate blood supply was confirmed with blanching and refill.  The pedicle was then wrapped with xeroform gauze and dressed appropriately with a telfa and gauze bandage to ensure continued blood supply and protect the attached pedicle.
Complex Repair And Xenograft Text: The defect edges were debeveled with a #15 scalpel blade.  The primary defect was closed partially with a complex linear closure.  Given the location of the defect, shape of the defect and the proximity to free margins a xenograft was deemed most appropriate to repair the remaining defect.  The graft was trimmed to fit the size of the remaining defect.  The graft was then placed in the primary defect, oriented appropriately, and sutured into place.
Medical Necessity Information: It is in your best interest to select a reason for this procedure from the list below. All of these items fulfill various CMS LCD requirements except lesion extends to a margin.
Double O-Z Plasty Text: The defect edges were debeveled with a #15 scalpel blade.  Given the location of the defect, shape of the defect and the proximity to free margins a Double O-Z plasty (double transposition flap) was deemed most appropriate.  Using a sterile surgical marker, the appropriate transposition flaps were drawn incorporating the defect and placing the expected incisions within the relaxed skin tension lines where possible. The area thus outlined was incised deep to adipose tissue with a #15 scalpel blade.  The skin margins were undermined to an appropriate distance in all directions utilizing iris scissors.  Hemostasis was achieved with electrocautery.  The flaps were then transposed into place, one clockwise and the other counterclockwise, and anchored with interrupted buried subcutaneous sutures.
Complex Repair And Dorsal Nasal Flap Text: The defect edges were debeveled with a #15 scalpel blade.  The primary defect was closed partially with a complex linear closure.  Given the location of the remaining defect, shape of the defect and the proximity to free margins a dorsal nasal flap was deemed most appropriate for complete closure of the defect.  Using a sterile surgical marker, an appropriate flap was drawn incorporating the defect and placing the expected incisions within the relaxed skin tension lines where possible.    The area thus outlined was incised deep to adipose tissue with a #15 scalpel blade.  The skin margins were undermined to an appropriate distance in all directions utilizing iris scissors.
Crescentic Advancement Flap Text: The defect edges were debeveled with a #15 scalpel blade.  Given the location of the defect and the proximity to free margins a crescentic advancement flap was deemed most appropriate.  Using a sterile surgical marker, the appropriate advancement flap was drawn incorporating the defect and placing the expected incisions within the relaxed skin tension lines where possible.    The area thus outlined was incised deep to adipose tissue with a #15 scalpel blade.  The skin margins were undermined to an appropriate distance in all directions utilizing iris scissors.
Length To Time In Minutes Device Was In Place: 10
Anesthesia Type: 1% lidocaine with 1:100,000 epinephrine and a 1:10 solution of 8.4% sodium bicarbonate
Suturegard Body: The suture ends were repeatedly re-tightened and re-clamped to achieve the desired tissue expansion.
Post-Care Instructions: I reviewed with the patient in detail post-care instructions. Patient is not to engage in any heavy lifting, exercise, or swimming for the next 14 days. Should the patient develop any fevers, chills, bleeding, severe pain patient will contact the office immediately.
Spiral Flap Text: The defect edges were debeveled with a #15 scalpel blade.  Given the location of the defect, shape of the defect and the proximity to free margins a spiral flap was deemed most appropriate.  Using a sterile surgical marker, an appropriate rotation flap was drawn incorporating the defect and placing the expected incisions within the relaxed skin tension lines where possible. The area thus outlined was incised deep to adipose tissue with a #15 scalpel blade.  The skin margins were undermined to an appropriate distance in all directions utilizing iris scissors.
Repair Type: Intermediate
Advancement-Rotation Flap Text: The defect edges were debeveled with a #15 scalpel blade.  Given the location of the defect, shape of the defect and the proximity to free margins an advancement-rotation flap was deemed most appropriate.  Using a sterile surgical marker, an appropriate flap was drawn incorporating the defect and placing the expected incisions within the relaxed skin tension lines where possible. The area thus outlined was incised deep to adipose tissue with a #15 scalpel blade.  The skin margins were undermined to an appropriate distance in all directions utilizing iris scissors.
Number Of Hemigard Strips Per Side: 1
Retention Suture Bite Size: 3 mm
Scalpel Size: 15 blade
Cartilage Graft Text: The defect edges were debeveled with a #15 scalpel blade.  Given the location of the defect, shape of the defect, the fact the defect involved a full thickness cartilage defect a cartilage graft was deemed most appropriate.  An appropriate donor site was identified, cleansed, and anesthetized. The cartilage graft was then harvested and transferred to the recipient site, oriented appropriately and then sutured into place.  The secondary defect was then repaired using a primary closure.
Modified Advancement Flap Text: The defect edges were debeveled with a #15 scalpel blade.  Given the location of the defect, shape of the defect and the proximity to free margins a modified advancement flap was deemed most appropriate.  Using a sterile surgical marker, an appropriate advancement flap was drawn incorporating the defect and placing the expected incisions within the relaxed skin tension lines where possible.    The area thus outlined was incised deep to adipose tissue with a #15 scalpel blade.  The skin margins were undermined to an appropriate distance in all directions utilizing iris scissors.
Mastoid Interpolation Flap Text: A decision was made to reconstruct the defect utilizing an interpolation axial flap and a staged reconstruction.  A telfa template was made of the defect.  This telfa template was then used to outline the mastoid interpolation flap.  The donor area for the pedicle flap was then injected with anesthesia.  The flap was excised through the skin and subcutaneous tissue down to the layer of the underlying musculature.  The pedicle flap was carefully excised within this deep plane to maintain its blood supply.  The edges of the donor site were undermined.   The donor site was closed in a primary fashion.  The pedicle was then rotated into position and sutured.  Once the tube was sutured into place, adequate blood supply was confirmed with blanching and refill.  The pedicle was then wrapped with xeroform gauze and dressed appropriately with a telfa and gauze bandage to ensure continued blood supply and protect the attached pedicle.
No Repair - Repaired With Adjacent Surgical Defect Text (Leave Blank If You Do Not Want): After the excision the defect was repaired concurrently with another surgical defect which was in close approximation.
O-T Advancement Flap Text: The defect edges were debeveled with a #15 scalpel blade.  Given the location of the defect, shape of the defect and the proximity to free margins an O-T advancement flap was deemed most appropriate.  Using a sterile surgical marker, an appropriate advancement flap was drawn incorporating the defect and placing the expected incisions within the relaxed skin tension lines where possible.    The area thus outlined was incised deep to adipose tissue with a #15 scalpel blade.  The skin margins were undermined to an appropriate distance in all directions utilizing iris scissors.
Hemigard Postcare Instructions: The HEMIGARD strips are to remain completely dry for at least 5-7 days.
Double M-Plasty Intermediate Repair Preamble Text (Leave Blank If You Do Not Want): Undermining was performed with blunt dissection.
Hemigard Retention Suture: 2-0 Nylon
Crescentic Complex Repair Preamble Text (Leave Blank If You Do Not Want): Extensive wide undermining was performed.
Size Of Margin In Cm: 0.1
Star Wedge Flap Text: The defect edges were debeveled with a #15 scalpel blade.  Given the location of the defect, shape of the defect and the proximity to free margins a star wedge flap was deemed most appropriate.  Using a sterile surgical marker, an appropriate rotation flap was drawn incorporating the defect and placing the expected incisions within the relaxed skin tension lines where possible. The area thus outlined was incised deep to adipose tissue with a #15 scalpel blade.  The skin margins were undermined to an appropriate distance in all directions utilizing iris scissors.
Where Do You Want The Question To Include Opioid Counseling Located?: Case Summary Tab
Helical Rim Text: The closure involved the helical rim.
Complex Repair And O-L Flap Text: The defect edges were debeveled with a #15 scalpel blade.  The primary defect was closed partially with a complex linear closure.  Given the location of the remaining defect, shape of the defect and the proximity to free margins an O-L flap was deemed most appropriate for complete closure of the defect.  Using a sterile surgical marker, an appropriate flap was drawn incorporating the defect and placing the expected incisions within the relaxed skin tension lines where possible.    The area thus outlined was incised deep to adipose tissue with a #15 scalpel blade.  The skin margins were undermined to an appropriate distance in all directions utilizing iris scissors.
W Plasty Text: The lesion was extirpated to the level of the fat with a #15 scalpel blade.  Given the location of the defect, shape of the defect and the proximity to free margins a W-plasty was deemed most appropriate for repair.  Using a sterile surgical marker, the appropriate transposition arms of the W-plasty were drawn incorporating the defect and placing the expected incisions within the relaxed skin tension lines where possible.    The area thus outlined was incised deep to adipose tissue with a #15 scalpel blade.  The skin margins were undermined to an appropriate distance in all directions utilizing iris scissors.  The opposing transposition arms were then transposed into place in opposite direction and anchored with interrupted buried subcutaneous sutures.
Purse String (Intermediate) Text: Given the location of the defect and the characteristics of the surrounding skin a purse string intermediate closure was deemed most appropriate.  Undermining was performed circumferentially around the surgical defect.  A purse string suture was then placed and tightened.
Island Pedicle Flap-Requiring Vessel Identification Text: The defect edges were debeveled with a #15 scalpel blade.  Given the location of the defect, shape of the defect and the proximity to free margins an island pedicle advancement flap was deemed most appropriate.  Using a sterile surgical marker, an appropriate advancement flap was drawn, based on the axial vessel mentioned above, incorporating the defect, outlining the appropriate donor tissue and placing the expected incisions within the relaxed skin tension lines where possible.    The area thus outlined was incised deep to adipose tissue with a #15 scalpel blade.  The skin margins were undermined to an appropriate distance in all directions around the primary defect and laterally outward around the island pedicle utilizing iris scissors.  There was minimal undermining beneath the pedicle flap.
Complex Repair And Split-Thickness Skin Graft Text: The defect edges were debeveled with a #15 scalpel blade.  The primary defect was closed partially with a complex linear closure.  Given the location of the defect, shape of the defect and the proximity to free margins a split thickness skin graft was deemed most appropriate to repair the remaining defect.  The graft was trimmed to fit the size of the remaining defect.  The graft was then placed in the primary defect, oriented appropriately, and sutured into place.
Complex Repair And M Plasty Text: The defect edges were debeveled with a #15 scalpel blade.  The primary defect was closed partially with a complex linear closure.  Given the location of the remaining defect, shape of the defect and the proximity to free margins an M plasty was deemed most appropriate for complete closure of the defect.  Using a sterile surgical marker, an appropriate advancement flap was drawn incorporating the defect and placing the expected incisions within the relaxed skin tension lines where possible.    The area thus outlined was incised deep to adipose tissue with a #15 scalpel blade.  The skin margins were undermined to an appropriate distance in all directions utilizing iris scissors.
Eliptical Excision Additional Text (Leave Blank If You Do Not Want): The margin was drawn around the clinically apparent lesion.  An elliptical shape was then drawn on the skin incorporating the lesion and margins.  Incisions were then made along these lines to the appropriate tissue plane and the lesion was extirpated.
Billing Type: Third-Party Bill
Paramedian Forehead Flap Text: A decision was made to reconstruct the defect utilizing an interpolation axial flap and a staged reconstruction.  A telfa template was made of the defect.  This telfa template was then used to outline the paramedian forehead pedicle flap.  The donor area for the pedicle flap was then injected with anesthesia.  The flap was excised through the skin and subcutaneous tissue down to the layer of the underlying musculature.  The pedicle flap was carefully excised within this deep plane to maintain its blood supply.  The edges of the donor site were undermined.   The donor site was closed in a primary fashion.  The pedicle was then rotated into position and sutured.  Once the tube was sutured into place, adequate blood supply was confirmed with blanching and refill.  The pedicle was then wrapped with xeroform gauze and dressed appropriately with a telfa and gauze bandage to ensure continued blood supply and protect the attached pedicle.
Zygomaticofacial Flap Text: Given the location of the defect, shape of the defect and the proximity to free margins a zygomaticofacial flap was deemed most appropriate for repair.  Using a sterile surgical marker, the appropriate flap was drawn incorporating the defect and placing the expected incisions within the relaxed skin tension lines where possible. The area thus outlined was incised deep to adipose tissue with a #15 scalpel blade with preservation of a vascular pedicle.  The skin margins were undermined to an appropriate distance in all directions utilizing iris scissors.  The flap was then placed into the defect and anchored with interrupted buried subcutaneous sutures.
Complex Repair And Dermal Autograft Text: The defect edges were debeveled with a #15 scalpel blade.  The primary defect was closed partially with a complex linear closure.  Given the location of the defect, shape of the defect and the proximity to free margins an dermal autograft was deemed most appropriate to repair the remaining defect.  The graft was trimmed to fit the size of the remaining defect.  The graft was then placed in the primary defect, oriented appropriately, and sutured into place.
Bilobed Transposition Flap Text: The defect edges were debeveled with a #15 scalpel blade.  Given the location of the defect and the proximity to free margins a bilobed transposition flap was deemed most appropriate.  Using a sterile surgical marker, an appropriate bilobe flap drawn around the defect.    The area thus outlined was incised deep to adipose tissue with a #15 scalpel blade.  The skin margins were undermined to an appropriate distance in all directions utilizing iris scissors.
Consent was obtained from the patient. The risks and benefits to therapy were discussed in detail. Specifically, the risks of infection, scarring, bleeding, prolonged wound healing, incomplete removal, allergy to anesthesia, nerve injury and recurrence were addressed. Prior to the procedure, the treatment site was clearly identified and confirmed by the patient. All components of Universal Protocol/PAUSE Rule completed.
Epidermal Autograft Text: The defect edges were debeveled with a #15 scalpel blade.  Given the location of the defect, shape of the defect and the proximity to free margins an epidermal autograft was deemed most appropriate.  Using a sterile surgical marker, the primary defect shape was transferred to the donor site. The epidermal graft was then harvested.  The skin graft was then placed in the primary defect and oriented appropriately.
Rhomboid Transposition Flap Text: The defect edges were debeveled with a #15 scalpel blade.  Given the location of the defect and the proximity to free margins a rhomboid transposition flap was deemed most appropriate.  Using a sterile surgical marker, an appropriate rhomboid flap was drawn incorporating the defect.    The area thus outlined was incised deep to adipose tissue with a #15 scalpel blade.  The skin margins were undermined to an appropriate distance in all directions utilizing iris scissors.
Body Location Override (Optional - Billing Will Still Be Based On Selected Body Map Location If Applicable): Right Medial Upper Arm
Hatchet Flap Text: The defect edges were debeveled with a #15 scalpel blade.  Given the location of the defect, shape of the defect and the proximity to free margins a hatchet flap was deemed most appropriate.  Using a sterile surgical marker, an appropriate hatchet flap was drawn incorporating the defect and placing the expected incisions within the relaxed skin tension lines where possible.    The area thus outlined was incised deep to adipose tissue with a #15 scalpel blade.  The skin margins were undermined to an appropriate distance in all directions utilizing iris scissors.
Complex Repair And W Plasty Text: The defect edges were debeveled with a #15 scalpel blade.  The primary defect was closed partially with a complex linear closure.  Given the location of the remaining defect, shape of the defect and the proximity to free margins a W plasty was deemed most appropriate for complete closure of the defect.  Using a sterile surgical marker, an appropriate advancement flap was drawn incorporating the defect and placing the expected incisions within the relaxed skin tension lines where possible.    The area thus outlined was incised deep to adipose tissue with a #15 scalpel blade.  The skin margins were undermined to an appropriate distance in all directions utilizing iris scissors.
Orbicularis Oris Muscle Flap Text: The defect edges were debeveled with a #15 scalpel blade.  Given that the defect affected the competency of the oral sphincter an orbicularis oris muscle flap was deemed most appropriate to restore this competency and normal muscle function.  Using a sterile surgical marker, an appropriate flap was drawn incorporating the defect. The area thus outlined was incised with a #15 scalpel blade.
Excisional Biopsy Additional Text (Leave Blank If You Do Not Want): The margin was drawn around the clinically apparent lesion. An elliptical shape was then drawn on the skin incorporating the lesion and margins.  Incisions were then made along these lines to the appropriate tissue plane and the lesion was extirpated.
Burow's Advancement Flap Text: The defect edges were debeveled with a #15 scalpel blade.  Given the location of the defect and the proximity to free margins a Burow's advancement flap was deemed most appropriate.  Using a sterile surgical marker, the appropriate advancement flap was drawn incorporating the defect and placing the expected incisions within the relaxed skin tension lines where possible.    The area thus outlined was incised deep to adipose tissue with a #15 scalpel blade.  The skin margins were undermined to an appropriate distance in all directions utilizing iris scissors.
Double O-Z Flap Text: The defect edges were debeveled with a #15 scalpel blade.  Given the location of the defect, shape of the defect and the proximity to free margins a Double O-Z flap was deemed most appropriate.  Using a sterile surgical marker, an appropriate transposition flap was drawn incorporating the defect and placing the expected incisions within the relaxed skin tension lines where possible. The area thus outlined was incised deep to adipose tissue with a #15 scalpel blade.  The skin margins were undermined to an appropriate distance in all directions utilizing iris scissors.
Anesthesia Volume In Cc: 3.5
Complex Repair And Skin Substitute Graft Text: The defect edges were debeveled with a #15 scalpel blade.  The primary defect was closed partially with a complex linear closure.  Given the location of the remaining defect, shape of the defect and the proximity to free margins a skin substitute graft was deemed most appropriate to repair the remaining defect.  The graft was trimmed to fit the size of the remaining defect.  The graft was then placed in the primary defect, oriented appropriately, and sutured into place.
Interpolation Flap Text: A decision was made to reconstruct the defect utilizing an interpolation axial flap and a staged reconstruction.  A telfa template was made of the defect.  This telfa template was then used to outline the interpolation flap.  The donor area for the pedicle flap was then injected with anesthesia.  The flap was excised through the skin and subcutaneous tissue down to the layer of the underlying musculature.  The interpolation flap was carefully excised within this deep plane to maintain its blood supply.  The edges of the donor site were undermined.   The donor site was closed in a primary fashion.  The pedicle was then rotated into position and sutured.  Once the tube was sutured into place, adequate blood supply was confirmed with blanching and refill.  The pedicle was then wrapped with xeroform gauze and dressed appropriately with a telfa and gauze bandage to ensure continued blood supply and protect the attached pedicle.
Complex Repair And Ftsg Text: The defect edges were debeveled with a #15 scalpel blade.  The primary defect was closed partially with a complex linear closure.  Given the location of the defect, shape of the defect and the proximity to free margins a full thickness skin graft was deemed most appropriate to repair the remaining defect.  The graft was trimmed to fit the size of the remaining defect.  The graft was then placed in the primary defect, oriented appropriately, and sutured into place.
Split-Thickness Skin Graft Text: The defect edges were debeveled with a #15 scalpel blade.  Given the location of the defect, shape of the defect and the proximity to free margins a split thickness skin graft was deemed most appropriate.  Using a sterile surgical marker, the primary defect shape was transferred to the donor site. The split thickness graft was then harvested.  The skin graft was then placed in the primary defect and oriented appropriately.
Banner Transposition Flap Text: The defect edges were debeveled with a #15 scalpel blade.  Given the location of the defect and the proximity to free margins a Banner transposition flap was deemed most appropriate.  Using a sterile surgical marker, an appropriate flap drawn around the defect. The area thus outlined was incised deep to adipose tissue with a #15 scalpel blade.  The skin margins were undermined to an appropriate distance in all directions utilizing iris scissors.
Tissue Cultured Epidermal Autograft Text: The defect edges were debeveled with a #15 scalpel blade.  Given the location of the defect, shape of the defect and the proximity to free margins a tissue cultured epidermal autograft was deemed most appropriate.  The graft was then trimmed to fit the size of the defect.  The graft was then placed in the primary defect and oriented appropriately.
Melolabial Transposition Flap Text: The defect edges were debeveled with a #15 scalpel blade.  Given the location of the defect and the proximity to free margins a melolabial flap was deemed most appropriate.  Using a sterile surgical marker, an appropriate melolabial transposition flap was drawn incorporating the defect.    The area thus outlined was incised deep to adipose tissue with a #15 scalpel blade.  The skin margins were undermined to an appropriate distance in all directions utilizing iris scissors.
V-Y Plasty Text: The defect edges were debeveled with a #15 scalpel blade.  Given the location of the defect, shape of the defect and the proximity to free margins an V-Y advancement flap was deemed most appropriate.  Using a sterile surgical marker, an appropriate advancement flap was drawn incorporating the defect and placing the expected incisions within the relaxed skin tension lines where possible.    The area thus outlined was incised deep to adipose tissue with a #15 scalpel blade.  The skin margins were undermined to an appropriate distance in all directions utilizing iris scissors.
Complex Repair And Transposition Flap Text: The defect edges were debeveled with a #15 scalpel blade.  The primary defect was closed partially with a complex linear closure.  Given the location of the remaining defect, shape of the defect and the proximity to free margins a transposition flap was deemed most appropriate for complete closure of the defect.  Using a sterile surgical marker, an appropriate advancement flap was drawn incorporating the defect and placing the expected incisions within the relaxed skin tension lines where possible.    The area thus outlined was incised deep to adipose tissue with a #15 scalpel blade.  The skin margins were undermined to an appropriate distance in all directions utilizing iris scissors.
Alar Island Pedicle Flap Text: The defect edges were debeveled with a #15 scalpel blade.  Given the location of the defect, shape of the defect and the proximity to the alar rim an island pedicle advancement flap was deemed most appropriate.  Using a sterile surgical marker, an appropriate advancement flap was drawn incorporating the defect, outlining the appropriate donor tissue and placing the expected incisions within the nasal ala running parallel to the alar rim. The area thus outlined was incised with a #15 scalpel blade.  The skin margins were undermined minimally to an appropriate distance in all directions around the primary defect and laterally outward around the island pedicle utilizing iris scissors.  There was minimal undermining beneath the pedicle flap.
Complex Repair And A-T Advancement Flap Text: The defect edges were debeveled with a #15 scalpel blade.  The primary defect was closed partially with a complex linear closure.  Given the location of the remaining defect, shape of the defect and the proximity to free margins an A-T advancement flap was deemed most appropriate for complete closure of the defect.  Using a sterile surgical marker, an appropriate advancement flap was drawn incorporating the defect and placing the expected incisions within the relaxed skin tension lines where possible.    The area thus outlined was incised deep to adipose tissue with a #15 scalpel blade.  The skin margins were undermined to an appropriate distance in all directions utilizing iris scissors.
Epidermal Sutures: 5-0 Surgipro
Dressing: pressure dressing with telfa
Eye Clamp Note Details: An eye clamp was used during the procedure.
Epidermal Closure: running and interrupted
O-L Flap Text: The defect edges were debeveled with a #15 scalpel blade.  Given the location of the defect, shape of the defect and the proximity to free margins an O-L flap was deemed most appropriate.  Using a sterile surgical marker, an appropriate advancement flap was drawn incorporating the defect and placing the expected incisions within the relaxed skin tension lines where possible.    The area thus outlined was incised deep to adipose tissue with a #15 scalpel blade.  The skin margins were undermined to an appropriate distance in all directions utilizing iris scissors.
Graft Donor Site Bandage (Optional-Leave Blank If You Don't Want In Note): Steri-strips and a pressure bandage were applied to the donor site.
Advancement Flap (Single) Text: The defect edges were debeveled with a #15 scalpel blade.  Given the location of the defect and the proximity to free margins a single advancement flap was deemed most appropriate.  Using a sterile surgical marker, an appropriate advancement flap was drawn incorporating the defect and placing the expected incisions within the relaxed skin tension lines where possible.    The area thus outlined was incised deep to adipose tissue with a #15 scalpel blade.  The skin margins were undermined to an appropriate distance in all directions utilizing iris scissors.
Undermining Type: Entire Wound
Anesthesia Type: 1% lidocaine with epinephrine and a 1:10 solution of 8.4% sodium bicarbonate
Transposition Flap Text: The defect edges were debeveled with a #15 scalpel blade.  Given the location of the defect and the proximity to free margins a transposition flap was deemed most appropriate.  Using a sterile surgical marker, an appropriate transposition flap was drawn incorporating the defect.    The area thus outlined was incised deep to adipose tissue with a #15 scalpel blade.  The skin margins were undermined to an appropriate distance in all directions utilizing iris scissors.
Mucosal Advancement Flap Text: Given the location of the defect, shape of the defect and the proximity to free margins a mucosal advancement flap was deemed most appropriate. Incisions were made with a 15 blade scalpel in the appropriate fashion along the cutaneous vermillion border and the mucosal lip. The remaining actinically damaged mucosal tissue was excised.  The mucosal advancement flap was then elevated to the gingival sulcus with care taken to preserve the neurovascular structures and advanced into the primary defect. Care was taken to ensure that precise realignment of the vermilion border was achieved.
Lip Wedge Excision Repair Text: Given the location of the defect and the proximity to free margins a full thickness wedge repair was deemed most appropriate.  Using a sterile surgical marker, the appropriate repair was drawn incorporating the defect and placing the expected incisions perpendicular to the vermilion border.  The vermilion border was also meticulously outlined to ensure appropriate reapproximation during the repair.  The area thus outlined was incised through and through with a #15 scalpel blade.  The muscularis and dermis were reaproximated with deep sutures following hemostasis. Care was taken to realign the vermilion border before proceeding with the superficial closure.  Once the vermilion was realigned the superfical and mucosal closure was finished.
Saucerization Excision Additional Text (Leave Blank If You Do Not Want): The margin was drawn around the clinically apparent lesion.  Incisions were then made along these lines, in a tangential fashion, to the appropriate tissue plane and the lesion was extirpated.
X Size Of Lesion In Cm (Optional): 1.5
Complex Repair And Rotation Flap Text: The defect edges were debeveled with a #15 scalpel blade.  The primary defect was closed partially with a complex linear closure.  Given the location of the remaining defect, shape of the defect and the proximity to free margins a rotation flap was deemed most appropriate for complete closure of the defect.  Using a sterile surgical marker, an appropriate advancement flap was drawn incorporating the defect and placing the expected incisions within the relaxed skin tension lines where possible.    The area thus outlined was incised deep to adipose tissue with a #15 scalpel blade.  The skin margins were undermined to an appropriate distance in all directions utilizing iris scissors.
Cheek Interpolation Flap Text: A decision was made to reconstruct the defect utilizing an interpolation axial flap and a staged reconstruction.  A telfa template was made of the defect.  This telfa template was then used to outline the Cheek Interpolation flap.  The donor area for the pedicle flap was then injected with anesthesia.  The flap was excised through the skin and subcutaneous tissue down to the layer of the underlying musculature.  The interpolation flap was carefully excised within this deep plane to maintain its blood supply.  The edges of the donor site were undermined.   The donor site was closed in a primary fashion.  The pedicle was then rotated into position and sutured.  Once the tube was sutured into place, adequate blood supply was confirmed with blanching and refill.  The pedicle was then wrapped with xeroform gauze and dressed appropriately with a telfa and gauze bandage to ensure continued blood supply and protect the attached pedicle.
Complex Repair And Z Plasty Text: The defect edges were debeveled with a #15 scalpel blade.  The primary defect was closed partially with a complex linear closure.  Given the location of the remaining defect, shape of the defect and the proximity to free margins a Z plasty was deemed most appropriate for complete closure of the defect.  Using a sterile surgical marker, an appropriate advancement flap was drawn incorporating the defect and placing the expected incisions within the relaxed skin tension lines where possible.    The area thus outlined was incised deep to adipose tissue with a #15 scalpel blade.  The skin margins were undermined to an appropriate distance in all directions utilizing iris scissors.
Intermediate / Complex Repair - Final Wound Length In Cm: 1.8
Complex Repair And Double Advancement Flap Text: The defect edges were debeveled with a #15 scalpel blade.  The primary defect was closed partially with a complex linear closure.  Given the location of the remaining defect, shape of the defect and the proximity to free margins a double advancement flap was deemed most appropriate for complete closure of the defect.  Using a sterile surgical marker, an appropriate advancement flap was drawn incorporating the defect and placing the expected incisions within the relaxed skin tension lines where possible.    The area thus outlined was incised deep to adipose tissue with a #15 scalpel blade.  The skin margins were undermined to an appropriate distance in all directions utilizing iris scissors.
O-Z Plasty Text: The defect edges were debeveled with a #15 scalpel blade.  Given the location of the defect, shape of the defect and the proximity to free margins an O-Z plasty (double transposition flap) was deemed most appropriate.  Using a sterile surgical marker, the appropriate transposition flaps were drawn incorporating the defect and placing the expected incisions within the relaxed skin tension lines where possible.    The area thus outlined was incised deep to adipose tissue with a #15 scalpel blade.  The skin margins were undermined to an appropriate distance in all directions utilizing iris scissors.  Hemostasis was achieved with electrocautery.  The flaps were then transposed into place, one clockwise and the other counterclockwise, and anchored with interrupted buried subcutaneous sutures.
Estimated Blood Loss (Cc): minimal
Complex Repair And Tissue Cultured Epidermal Autograft Text: The defect edges were debeveled with a #15 scalpel blade.  The primary defect was closed partially with a complex linear closure.  Given the location of the defect, shape of the defect and the proximity to free margins an tissue cultured epidermal autograft was deemed most appropriate to repair the remaining defect.  The graft was trimmed to fit the size of the remaining defect.  The graft was then placed in the primary defect, oriented appropriately, and sutured into place.
Nostril Rim Text: The closure involved the nostril rim.
Nasalis-Muscle-Based Myocutaneous Island Pedicle Flap Text: Using a #15 blade, an incision was made around the donor flap to the level of the nasalis muscle. Wide lateral undermining was then performed in both the subcutaneous plane above the nasalis muscle, and in a submuscular plane just above periosteum. This allowed the formation of a free nasalis muscle axial pedicle (based on the angular artery) which was still attached to the actual cutaneous flap, increasing its mobility and vascular viability. Hemostasis was obtained with pinpoint electrocoagulation. The flap was mobilized into position and the pivotal anchor points positioned and stabilized with buried interrupted sutures. Subcutaneous and dermal tissues were closed in a multilayered fashion with sutures. Tissue redundancies were excised, and the epidermal edges were apposed without significant tension and sutured with sutures.
Dermal Autograft Text: The defect edges were debeveled with a #15 scalpel blade.  Given the location of the defect, shape of the defect and the proximity to free margins a dermal autograft was deemed most appropriate.  Using a sterile surgical marker, the primary defect shape was transferred to the donor site. The area thus outlined was incised deep to adipose tissue with a #15 scalpel blade.  The harvested graft was then trimmed of adipose and epidermal tissue until only dermis was left.  The skin graft was then placed in the primary defect and oriented appropriately.
Island Pedicle Flap Text: The defect edges were debeveled with a #15 scalpel blade.  Given the location of the defect, shape of the defect and the proximity to free margins an island pedicle advancement flap was deemed most appropriate.  Using a sterile surgical marker, an appropriate advancement flap was drawn incorporating the defect, outlining the appropriate donor tissue and placing the expected incisions within the relaxed skin tension lines where possible.    The area thus outlined was incised deep to adipose tissue with a #15 scalpel blade.  The skin margins were undermined to an appropriate distance in all directions around the primary defect and laterally outward around the island pedicle utilizing iris scissors.  There was minimal undermining beneath the pedicle flap.
Bilateral Helical Rim Advancement Flap Text: The defect edges were debeveled with a #15 blade scalpel.  Given the location of the defect and the proximity to free margins (helical rim) a bilateral helical rim advancement flap was deemed most appropriate.  Using a sterile surgical marker, the appropriate advancement flaps were drawn incorporating the defect and placing the expected incisions between the helical rim and antihelix where possible.  The area thus outlined was incised through and through with a #15 scalpel blade.  With a skin hook and iris scissors, the flaps were gently and sharply undermined and freed up.
Excision Depth: adipose tissue
Home Suture Removal Text: Patient was provided a home suture removal kit and will remove their sutures at home.  If they have any questions or difficulties they will call the office.
Epidermal Closure: running
Hemigard Intro: Due to skin fragility and wound tension, it was decided to use HEMIGARD adhesive retention suture devices to permit a linear closure. The skin was cleaned and dried for a 6cm distance away from the wound. Excessive hair, if present, was removed to allow for adhesion.
Staged Advancement Flap Text: The defect edges were debeveled with a #15 scalpel blade.  Given the location of the defect, shape of the defect and the proximity to free margins a staged advancement flap was deemed most appropriate.  Using a sterile surgical marker, an appropriate advancement flap was drawn incorporating the defect and placing the expected incisions within the relaxed skin tension lines where possible. The area thus outlined was incised deep to adipose tissue with a #15 scalpel blade.  The skin margins were undermined to an appropriate distance in all directions utilizing iris scissors.
Fusiform Excision Additional Text (Leave Blank If You Do Not Want): The margin was drawn around the clinically apparent lesion.  A fusiform shape was then drawn on the skin incorporating the lesion and margins.  Incisions were then made along these lines to the appropriate tissue plane and the lesion was extirpated.
Repair Performed By Another Provider Text (Leave Blank If You Do Not Want): After the tissue was excised the defect was repaired by another provider.
Mercedes Flap Text: The defect edges were debeveled with a #15 scalpel blade.  Given the location of the defect, shape of the defect and the proximity to free margins a Mercedes flap was deemed most appropriate.  Using a sterile surgical marker, an appropriate advancement flap was drawn incorporating the defect and placing the expected incisions within the relaxed skin tension lines where possible. The area thus outlined was incised deep to adipose tissue with a #15 scalpel blade.  The skin margins were undermined to an appropriate distance in all directions utilizing iris scissors.
A-T Advancement Flap Text: The defect edges were debeveled with a #15 scalpel blade.  Given the location of the defect, shape of the defect and the proximity to free margins an A-T advancement flap was deemed most appropriate.  Using a sterile surgical marker, an appropriate advancement flap was drawn incorporating the defect and placing the expected incisions within the relaxed skin tension lines where possible.    The area thus outlined was incised deep to adipose tissue with a #15 scalpel blade.  The skin margins were undermined to an appropriate distance in all directions utilizing iris scissors.
Retention Suture Text: Retention sutures were placed to support the closure and prevent dehiscence.
Posterior Auricular Interpolation Flap Text: A decision was made to reconstruct the defect utilizing an interpolation axial flap and a staged reconstruction.  A telfa template was made of the defect.  This telfa template was then used to outline the posterior auricular interpolation flap.  The donor area for the pedicle flap was then injected with anesthesia.  The flap was excised through the skin and subcutaneous tissue down to the layer of the underlying musculature.  The pedicle flap was carefully excised within this deep plane to maintain its blood supply.  The edges of the donor site were undermined.   The donor site was closed in a primary fashion.  The pedicle was then rotated into position and sutured.  Once the tube was sutured into place, adequate blood supply was confirmed with blanching and refill.  The pedicle was then wrapped with xeroform gauze and dressed appropriately with a telfa and gauze bandage to ensure continued blood supply and protect the attached pedicle.
Complex Repair And Epidermal Autograft Text: The defect edges were debeveled with a #15 scalpel blade.  The primary defect was closed partially with a complex linear closure.  Given the location of the defect, shape of the defect and the proximity to free margins an epidermal autograft was deemed most appropriate to repair the remaining defect.  The graft was trimmed to fit the size of the remaining defect.  The graft was then placed in the primary defect, oriented appropriately, and sutured into place.
Composite Graft Text: The defect edges were debeveled with a #15 scalpel blade.  Given the location of the defect, shape of the defect, the proximity to free margins and the fact the defect was full thickness a composite graft was deemed most appropriate.  The defect was outline and then transferred to the donor site.  A full thickness graft was then excised from the donor site. The graft was then placed in the primary defect, oriented appropriately and then sutured into place.  The secondary defect was then repaired using a primary closure.
Purse String (Simple) Text: Given the location of the defect and the characteristics of the surrounding skin a purse string simple closure was deemed most appropriate.  Undermining was performed circumferentially around the surgical defect.  A purse string suture was then placed and tightened.
Medical Necessity Clause: This procedure was medically necessary because the lesion that was treated was:
Keystone Flap Text: The defect edges were debeveled with a #15 scalpel blade.  Given the location of the defect, shape of the defect a keystone flap was deemed most appropriate.  Using a sterile surgical marker, an appropriate keystone flap was drawn incorporating the defect, outlining the appropriate donor tissue and placing the expected incisions within the relaxed skin tension lines where possible. The area thus outlined was incised deep to adipose tissue with a #15 scalpel blade.  The skin margins were undermined to an appropriate distance in all directions around the primary defect and laterally outward around the flap utilizing iris scissors.
Trilobed Flap Text: The defect edges were debeveled with a #15 scalpel blade.  Given the location of the defect and the proximity to free margins a trilobed flap was deemed most appropriate.  Using a sterile surgical marker, an appropriate trilobed flap drawn around the defect.    The area thus outlined was incised deep to adipose tissue with a #15 scalpel blade.  The skin margins were undermined to an appropriate distance in all directions utilizing iris scissors.
Complex Repair And Double M Plasty Text: The defect edges were debeveled with a #15 scalpel blade.  The primary defect was closed partially with a complex linear closure.  Given the location of the remaining defect, shape of the defect and the proximity to free margins a double M plasty was deemed most appropriate for complete closure of the defect.  Using a sterile surgical marker, an appropriate advancement flap was drawn incorporating the defect and placing the expected incisions within the relaxed skin tension lines where possible.    The area thus outlined was incised deep to adipose tissue with a #15 scalpel blade.  The skin margins were undermined to an appropriate distance in all directions utilizing iris scissors.
Suture Removal: 10 days
Complex Repair And Bilobe Flap Text: The defect edges were debeveled with a #15 scalpel blade.  The primary defect was closed partially with a complex linear closure.  Given the location of the remaining defect, shape of the defect and the proximity to free margins a bilobe flap was deemed most appropriate for complete closure of the defect.  Using a sterile surgical marker, an appropriate advancement flap was drawn incorporating the defect and placing the expected incisions within the relaxed skin tension lines where possible.    The area thus outlined was incised deep to adipose tissue with a #15 scalpel blade.  The skin margins were undermined to an appropriate distance in all directions utilizing iris scissors.
Z Plasty Text: The lesion was extirpated to the level of the fat with a #15 scalpel blade.  Given the location of the defect, shape of the defect and the proximity to free margins a Z-plasty was deemed most appropriate for repair.  Using a sterile surgical marker, the appropriate transposition arms of the Z-plasty were drawn incorporating the defect and placing the expected incisions within the relaxed skin tension lines where possible.    The area thus outlined was incised deep to adipose tissue with a #15 scalpel blade.  The skin margins were undermined to an appropriate distance in all directions utilizing iris scissors.  The opposing transposition arms were then transposed into place in opposite direction and anchored with interrupted buried subcutaneous sutures.
Vermilion Border Text: The closure involved the vermilion border.
Information: Selecting Yes will display possible errors in your note based on the variables you have selected. This validation is only offered as a suggestion for you. PLEASE NOTE THAT THE VALIDATION TEXT WILL BE REMOVED WHEN YOU FINALIZE YOUR NOTE. IF YOU WANT TO FAX A PRELIMINARY NOTE YOU WILL NEED TO TOGGLE THIS TO 'NO' IF YOU DO NOT WANT IT IN YOUR FAXED NOTE.
Bi-Rhombic Flap Text: The defect edges were debeveled with a #15 scalpel blade.  Given the location of the defect and the proximity to free margins a bi-rhombic flap was deemed most appropriate.  Using a sterile surgical marker, an appropriate rhombic flap was drawn incorporating the defect. The area thus outlined was incised deep to adipose tissue with a #15 scalpel blade.  The skin margins were undermined to an appropriate distance in all directions utilizing iris scissors.
V-Y Flap Text: The defect edges were debeveled with a #15 scalpel blade.  Given the location of the defect, shape of the defect and the proximity to free margins a V-Y flap was deemed most appropriate.  Using a sterile surgical marker, an appropriate advancement flap was drawn incorporating the defect and placing the expected incisions within the relaxed skin tension lines where possible.    The area thus outlined was incised deep to adipose tissue with a #15 scalpel blade.  The skin margins were undermined to an appropriate distance in all directions utilizing iris scissors.
Suturegard Intro: Intraoperative tissue expansion was performed, utilizing the SUTUREGARD device, in order to reduce wound tension.
Chonodrocutaneous Helical Advancement Flap Text: The defect edges were debeveled with a #15 scalpel blade.  Given the location of the defect and the proximity to free margins a chondrocutaneous helical advancement flap was deemed most appropriate.  Using a sterile surgical marker, the appropriate advancement flap was drawn incorporating the defect and placing the expected incisions within the relaxed skin tension lines where possible.    The area thus outlined was incised deep to adipose tissue with a #15 scalpel blade.  The skin margins were undermined to an appropriate distance in all directions utilizing iris scissors.
Wound Care: Petrolatum
Rotation Flap Text: The defect edges were debeveled with a #15 scalpel blade.  Given the location of the defect, shape of the defect and the proximity to free margins a rotation flap was deemed most appropriate.  Using a sterile surgical marker, an appropriate rotation flap was drawn incorporating the defect and placing the expected incisions within the relaxed skin tension lines where possible.    The area thus outlined was incised deep to adipose tissue with a #15 scalpel blade.  The skin margins were undermined to an appropriate distance in all directions utilizing iris scissors.
Deep Sutures: 5-0 Monocryl
Additional Anesthesia Volume In Cc: 6
Perilesional Excision Additional Text (Leave Blank If You Do Not Want): The margin was drawn around the clinically apparent lesion. Incisions were then made along these lines to the appropriate tissue plane and the lesion was extirpated.
Complex Repair And Burow's Graft Text: The defect edges were debeveled with a #15 scalpel blade.  The primary defect was closed partially with a complex linear closure.  Given the location of the defect, shape of the defect, the proximity to free margins and the presence of a standing cone deformity a Burow's graft was deemed most appropriate to repair the remaining defect.  The graft was trimmed to fit the size of the remaining defect.  The graft was then placed in the primary defect, oriented appropriately, and sutured into place.
H Plasty Text: Given the location of the defect, shape of the defect and the proximity to free margins a H-plasty was deemed most appropriate for repair.  Using a sterile surgical marker, the appropriate advancement arms of the H-plasty were drawn incorporating the defect and placing the expected incisions within the relaxed skin tension lines where possible. The area thus outlined was incised deep to adipose tissue with a #15 scalpel blade. The skin margins were undermined to an appropriate distance in all directions utilizing iris scissors.  The opposing advancement arms were then advanced into place in opposite direction and anchored with interrupted buried subcutaneous sutures.
Complex Repair And V-Y Plasty Text: The defect edges were debeveled with a #15 scalpel blade.  The primary defect was closed partially with a complex linear closure.  Given the location of the remaining defect, shape of the defect and the proximity to free margins a V-Y plasty was deemed most appropriate for complete closure of the defect.  Using a sterile surgical marker, an appropriate advancement flap was drawn incorporating the defect and placing the expected incisions within the relaxed skin tension lines where possible.    The area thus outlined was incised deep to adipose tissue with a #15 scalpel blade.  The skin margins were undermined to an appropriate distance in all directions utilizing iris scissors.
Melolabial Interpolation Flap Text: A decision was made to reconstruct the defect utilizing an interpolation axial flap and a staged reconstruction.  A telfa template was made of the defect.  This telfa template was then used to outline the melolabial interpolation flap.  The donor area for the pedicle flap was then injected with anesthesia.  The flap was excised through the skin and subcutaneous tissue down to the layer of the underlying musculature.  The pedicle flap was carefully excised within this deep plane to maintain its blood supply.  The edges of the donor site were undermined.   The donor site was closed in a primary fashion.  The pedicle was then rotated into position and sutured.  Once the tube was sutured into place, adequate blood supply was confirmed with blanching and refill.  The pedicle was then wrapped with xeroform gauze and dressed appropriately with a telfa and gauze bandage to ensure continued blood supply and protect the attached pedicle.
Epidermal Closure Graft Donor Site (Optional): simple interrupted
Burow's Graft Text: The defect edges were debeveled with a #15 scalpel blade.  Given the location of the defect, shape of the defect, the proximity to free margins and the presence of a standing cone deformity a Burow's skin graft was deemed most appropriate. The standing cone was removed and this tissue was then trimmed to the shape of the primary defect. The adipose tissue was also removed until only dermis and epidermis were left.  The skin margins of the secondary defect were undermined to an appropriate distance in all directions utilizing iris scissors.  The secondary defect was closed with interrupted buried subcutaneous sutures.  The skin edges were then re-apposed with running  sutures.  The skin graft was then placed in the primary defect and oriented appropriately.
Rhombic Flap Text: The defect edges were debeveled with a #15 scalpel blade.  Given the location of the defect and the proximity to free margins a rhombic flap was deemed most appropriate.  Using a sterile surgical marker, an appropriate rhombic flap was drawn incorporating the defect.    The area thus outlined was incised deep to adipose tissue with a #15 scalpel blade.  The skin margins were undermined to an appropriate distance in all directions utilizing iris scissors.
Intermediate / Complex Repair - Final Wound Length In Cm: 1.1
Double Island Pedicle Flap Text: The defect edges were debeveled with a #15 scalpel blade.  Given the location of the defect, shape of the defect and the proximity to free margins a double island pedicle advancement flap was deemed most appropriate.  Using a sterile surgical marker, an appropriate advancement flap was drawn incorporating the defect, outlining the appropriate donor tissue and placing the expected incisions within the relaxed skin tension lines where possible.    The area thus outlined was incised deep to adipose tissue with a #15 scalpel blade.  The skin margins were undermined to an appropriate distance in all directions around the primary defect and laterally outward around the island pedicle utilizing iris scissors.  There was minimal undermining beneath the pedicle flap.
Complex Repair And O-T Advancement Flap Text: The defect edges were debeveled with a #15 scalpel blade.  The primary defect was closed partially with a complex linear closure.  Given the location of the remaining defect, shape of the defect and the proximity to free margins an O-T advancement flap was deemed most appropriate for complete closure of the defect.  Using a sterile surgical marker, an appropriate advancement flap was drawn incorporating the defect and placing the expected incisions within the relaxed skin tension lines where possible.    The area thus outlined was incised deep to adipose tissue with a #15 scalpel blade.  The skin margins were undermined to an appropriate distance in all directions utilizing iris scissors.
Bilobed Flap Text: The defect edges were debeveled with a #15 scalpel blade.  Given the location of the defect and the proximity to free margins a bilobe flap was deemed most appropriate.  Using a sterile surgical marker, an appropriate bilobe flap drawn around the defect.    The area thus outlined was incised deep to adipose tissue with a #15 scalpel blade.  The skin margins were undermined to an appropriate distance in all directions utilizing iris scissors.
Xenograft Text: The defect edges were debeveled with a #15 scalpel blade.  Given the location of the defect, shape of the defect and the proximity to free margins a xenograft was deemed most appropriate.  The graft was then trimmed to fit the size of the defect.  The graft was then placed in the primary defect and oriented appropriately.
Hemostasis: Electrodesiccation
Positioning (Leave Blank If You Do Not Want): The patient was placed in a comfortable position exposing the surgical site.
Muscle Hinge Flap Text: The defect edges were debeveled with a #15 scalpel blade.  Given the size, depth and location of the defect and the proximity to free margins a muscle hinge flap was deemed most appropriate.  Using a sterile surgical marker, an appropriate hinge flap was drawn incorporating the defect. The area thus outlined was incised with a #15 scalpel blade.  The skin margins were undermined to an appropriate distance in all directions utilizing iris scissors.
Advancement Flap (Double) Text: The defect edges were debeveled with a #15 scalpel blade.  Given the location of the defect and the proximity to free margins a double advancement flap was deemed most appropriate.  Using a sterile surgical marker, the appropriate advancement flaps were drawn incorporating the defect and placing the expected incisions within the relaxed skin tension lines where possible.    The area thus outlined was incised deep to adipose tissue with a #15 scalpel blade.  The skin margins were undermined to an appropriate distance in all directions utilizing iris scissors.
Slit Excision Additional Text (Leave Blank If You Do Not Want): A linear line was drawn on the skin overlying the lesion. An incision was made slowly until the lesion was visualized.  Once visualized, the lesion was removed with blunt dissection.
Detail Level: Detailed
Peng Advancement Flap Text: The defect edges were debeveled with a #15 scalpel blade.  Given the location of the defect, shape of the defect and the proximity to free margins a Peng advancement flap was deemed most appropriate.  Using a sterile surgical marker, an appropriate advancement flap was drawn incorporating the defect and placing the expected incisions within the relaxed skin tension lines where possible. The area thus outlined was incised deep to adipose tissue with a #15 scalpel blade.  The skin margins were undermined to an appropriate distance in all directions utilizing iris scissors.
O-Z Flap Text: The defect edges were debeveled with a #15 scalpel blade.  Given the location of the defect, shape of the defect and the proximity to free margins an O-Z flap was deemed most appropriate.  Using a sterile surgical marker, an appropriate transposition flap was drawn incorporating the defect and placing the expected incisions within the relaxed skin tension lines where possible. The area thus outlined was incised deep to adipose tissue with a #15 scalpel blade.  The skin margins were undermined to an appropriate distance in all directions utilizing iris scissors.
Body Location Override (Optional - Billing Will Still Be Based On Selected Body Map Location If Applicable): Right Distal Upper Arm
Size Of Lesion In Cm: 3

## 2021-07-27 NOTE — PROCEDURE: MIPS QUALITY
Quality 130: Documentation Of Current Medications In The Medical Record: Current Medications Documented
Quality 431: Preventive Care And Screening: Unhealthy Alcohol Use - Screening: Patient screened for unhealthy alcohol use using a single question and scores less than 2 times per year
Quality 226: Preventive Care And Screening: Tobacco Use: Screening And Cessation Intervention: Patient screened for tobacco use and is an ex/non-smoker
Quality 110: Preventive Care And Screening: Influenza Immunization: Influenza Immunization Administered during Influenza season
Detail Level: Detailed
Quality 265: Biopsy Follow-Up: Biopsy results reviewed, communicated, tracked, and documented

## 2021-08-05 ENCOUNTER — APPOINTMENT (OUTPATIENT)
Dept: URBAN - METROPOLITAN AREA CLINIC 255 | Age: 71
Setting detail: DERMATOLOGY
End: 2021-08-15

## 2021-08-05 DIAGNOSIS — Z48.02 ENCOUNTER FOR REMOVAL OF SUTURES: ICD-10-CM

## 2021-08-05 PROCEDURE — OTHER COUNSELING: OTHER

## 2021-08-05 PROCEDURE — OTHER SUTURE REMOVAL (GLOBAL PERIOD): OTHER

## 2021-08-05 PROCEDURE — OTHER MIPS QUALITY: OTHER

## 2021-08-05 PROCEDURE — OTHER DIAGNOSIS COMMENT: OTHER

## 2021-08-05 ASSESSMENT — LOCATION ZONE DERM: LOCATION ZONE: ARM

## 2021-08-05 ASSESSMENT — LOCATION DETAILED DESCRIPTION DERM: LOCATION DETAILED: RIGHT ANTERIOR DISTAL UPPER ARM

## 2021-08-05 ASSESSMENT — LOCATION SIMPLE DESCRIPTION DERM: LOCATION SIMPLE: RIGHT UPPER ARM

## 2021-08-05 NOTE — PROCEDURE: SUTURE REMOVAL (GLOBAL PERIOD)
Body Location Override (Optional - Billing Will Still Be Based On Selected Body Map Location If Applicable): Right Medial Upper Arm
Add 20244 Cpt? (Important Note: In 2017 The Use Of 70645 Is Being Tracked By Cms To Determine Future Global Period Reimbursement For Global Periods): no
Detail Level: Detailed
Body Location Override (Optional - Billing Will Still Be Based On Selected Body Map Location If Applicable): Right Distal Upper Arm

## 2021-08-05 NOTE — PROCEDURE: DIAGNOSIS COMMENT
Comment: Post Excision Lipoma (7/27/2021).
Render Risk Assessment In Note?: yes
Detail Level: Simple

## 2021-09-02 ENCOUNTER — TELEPHONE (OUTPATIENT)
Dept: FAMILY MEDICINE | Facility: CLINIC | Age: 71
End: 2021-09-02

## 2021-09-02 NOTE — TELEPHONE ENCOUNTER
Tuesday evening was with this friend who may have been exposed to COVID positive on Sunday at a wedding. Questions about where both can go for testing, provided information about [facility] community testing sites. All questions answered. Call back if questions, concerns, or symptoms.   Lesa Nayak MS RN-BC  09/02/21  12:40 PM

## 2021-09-02 NOTE — TELEPHONE ENCOUNTER
Called and LVM - if patient would still like to speak to a nurse, please call back and let the  staff know you missed a call from nurse. OK to transfer this call to RN.  Lesa Nayak MS RN-BC  09/02/21  10:11 AM

## 2021-09-07 ENCOUNTER — TELEPHONE (OUTPATIENT)
Dept: FAMILY MEDICINE | Facility: CLINIC | Age: 71
End: 2021-09-07

## 2021-09-07 NOTE — TELEPHONE ENCOUNTER
"Pt calling clinic - states she woke up this AM with \"extreme nausea\". States shortly after that she had a large loose BM. She has had 3 watery stools since then. Denies fever, illness. Is thinking this could be food poisoning or a viral illness. She has mild abdominal cramping at times.   Advised increasing fluids, use of gatorade or similar product. Also to eat light foods, such as BRAT diet. Pt agrees with plan.  She will call back prn with concerns.  Adelina Olea RN  [facility]  "

## 2021-09-24 ENCOUNTER — OFFICE VISIT (OUTPATIENT)
Dept: FAMILY MEDICINE | Facility: CLINIC | Age: 71
End: 2021-09-24
Payer: COMMERCIAL

## 2021-09-24 VITALS
OXYGEN SATURATION: 97 % | BODY MASS INDEX: 21.89 KG/M2 | WEIGHT: 133 LBS | RESPIRATION RATE: 13 BRPM | HEART RATE: 65 BPM | DIASTOLIC BLOOD PRESSURE: 60 MMHG | SYSTOLIC BLOOD PRESSURE: 116 MMHG | TEMPERATURE: 96.6 F

## 2021-09-24 DIAGNOSIS — H93.92 EAR PROBLEM, LEFT: Primary | ICD-10-CM

## 2021-09-24 NOTE — PROGRESS NOTES
ASSESSMENT AND PLAN:     (H93.92) Ear problem, left  (primary encounter diagnosis)  Comment: Sensation of movement/water in her left ear without any other real symptoms. Small amount of wax very close in proximity to left TM could theoretically be shifting around and causing the sensation. I cannot tell from her characterization (and nor can Sami) if this is an outer ear canal issue or an inner ear pressure/fluid problem. TM does not appear retracted or bulging to me today and there were no air-fluid levels I could see.    We irrigated her ear canal successfully to remove the loose wax.    If sensation resolves, no further intervention planned. If it doesn't, then I encouraged her to follow up with her usual ENT. I advised either Afrin nasal spray or Sudafed prophylacticly before her upcoming flight in case this is a eustachian tube/inner ear problem.   Plan:       Malik Brunner MD   [facility]  09/24/2021, 1:17 PM      SUBJECTIVE:   Sami is a 71 year old female who presents to clinic today for a return visit.    # Left Ear Problem  - feels like something moving in her ear like water  - going on for a few months  - was infrequent and subtle at first  - has worsened now in severity and frequency  - hasn't noticed hearing changes  - has chronic tinnitus, unchanged recently  - getting more uncomfortable with time, not quite painful  - no itching  - no discharge/drainage from the canal  - no sinus symptoms    - uses foam ear plugs frequently when using hair dryer or   - doesn't use q-tips    - tried home remedy with hydrogen peroxide and vinegar, caused vertigo        Patient Active Problem List   Diagnosis     Migraine with aura and without status migrainosus, not intractable     Glaucoma     Pre-diabetes     Leg cramps     Stress at work     Muscle twitching     Hypothyroidism     Family history of malignant neoplasm of breast     Current Outpatient Medications   Medication      brimonidine (ALPHAGAN) 0.2 % ophthalmic solution     cyanocobalamin (VITAMIN B-12) 1000 MCG SUBL sublingual tablet     estradiol (VAGIFEM) 10 MCG TABS vaginal tablet     frovatriptan (FROVA) 2.5 MG tablet     gabapentin (NEURONTIN) 300 MG capsule     latanoprost (XALATAN) 0.005 % ophthalmic solution     levothyroxine (SYNTHROID/LEVOTHROID) 88 MCG tablet     medical cannabis (Patient's own supply.  Not a prescription)     medical cannabis (Patient's own supply.  Not a prescription)     metroNIDAZOLE 0.75 % LOTN     polyethylene glycol - propylene glycol (SYSTANE ULTRA) 0.4-0.3 % SOLN     SUMAtriptan (IMITREX) 100 MG tablet     fluocinonide (LIDEX) 0.05 % external solution     Magnesium 400 MG CAPS     No current facility-administered medications for this visit.       I have reviewed the patient's relevant past medical history.     OBJECTIVE:   /60 (BP Location: Left arm, Patient Position: Sitting, Cuff Size: Adult Regular)   Pulse 65   Temp (!) 96.6  F (35.9  C) (Skin)   Resp 13   Wt 60.3 kg (133 lb)   SpO2 97%   BMI 21.89 kg/m      Constitutional: well-appearing, appears stated age  Eyes: conjunctivae without erythema, sclera anicteric.   ENT: right TM and canal normal. Left TM normal. Has some dried chunks of cerumen that might be leaning against her TM - difficult to say without depth perception through otoscope.  Skin: no rashes, lesions, or wounds  Psych: affect is full and appropriate, speech is fluent and non-pressured

## 2021-09-24 NOTE — NURSING NOTE
71 year old  Chief Complaint   Patient presents with     Ear Fullness     left side        Blood pressure 116/60, pulse 65, temperature (!) 96.6  F (35.9  C), temperature source Skin, resp. rate 13, weight 60.3 kg (133 lb), SpO2 97 %. Body mass index is 21.89 kg/m .  Patient Active Problem List   Diagnosis     Migraine with aura and without status migrainosus, not intractable     Glaucoma     Pre-diabetes     Leg cramps     Stress at work     Muscle twitching     Hypothyroidism     Family history of malignant neoplasm of breast       Wt Readings from Last 2 Encounters:   09/24/21 60.3 kg (133 lb)   06/30/21 59.4 kg (131 lb)     BP Readings from Last 3 Encounters:   09/24/21 116/60   06/30/21 115/70   09/18/20 126/60         Current Outpatient Medications   Medication     brimonidine (ALPHAGAN) 0.2 % ophthalmic solution     cyanocobalamin (VITAMIN B-12) 1000 MCG SUBL sublingual tablet     estradiol (VAGIFEM) 10 MCG TABS vaginal tablet     frovatriptan (FROVA) 2.5 MG tablet     gabapentin (NEURONTIN) 300 MG capsule     latanoprost (XALATAN) 0.005 % ophthalmic solution     levothyroxine (SYNTHROID/LEVOTHROID) 88 MCG tablet     medical cannabis (Patient's own supply.  Not a prescription)     medical cannabis (Patient's own supply.  Not a prescription)     metroNIDAZOLE 0.75 % LOTN     polyethylene glycol - propylene glycol (SYSTANE ULTRA) 0.4-0.3 % SOLN     SUMAtriptan (IMITREX) 100 MG tablet     fluocinonide (LIDEX) 0.05 % external solution     Magnesium 400 MG CAPS     No current facility-administered medications for this visit.       Social History     Tobacco Use     Smoking status: Never Smoker     Smokeless tobacco: Never Used   Substance Use Topics     Alcohol use: No     Drug use: No       Health Maintenance Due   Topic Date Due     ADVANCE CARE PLANNING  Never done     MAMMO SCREENING  12/03/2019     INFLUENZA VACCINE (1) 09/01/2021       No results found for: PAP      September 24, 2021 1:15 PM

## 2021-10-22 ENCOUNTER — TRANSFERRED RECORDS (OUTPATIENT)
Dept: HEALTH INFORMATION MANAGEMENT | Facility: CLINIC | Age: 71
End: 2021-10-22
Payer: COMMERCIAL

## 2021-10-23 ENCOUNTER — HEALTH MAINTENANCE LETTER (OUTPATIENT)
Age: 71
End: 2021-10-23

## 2021-10-24 ENCOUNTER — MYC MEDICAL ADVICE (OUTPATIENT)
Dept: FAMILY MEDICINE | Facility: CLINIC | Age: 71
End: 2021-10-24

## 2021-11-08 DIAGNOSIS — G43.109 MIGRAINE WITH AURA AND WITHOUT STATUS MIGRAINOSUS, NOT INTRACTABLE: ICD-10-CM

## 2021-11-08 NOTE — TELEPHONE ENCOUNTER
Who is calling? Patient  Medication name: SUMAtriptan (IMITREX) 100 MG tablet  Is this a refill request? Yes  Have they contacted the pharmacy?  Yes  Pharmacy:   [id number] - [place], [place] - [address] AT [facility] [address]  [address] [facility] [address]  Phone: [phone] Fax: [phone]  Question/Concern: refill request  Would patient like a call back? Yes

## 2021-11-09 RX ORDER — SUMATRIPTAN 100 MG/1
TABLET, FILM COATED ORAL
Qty: 9 TABLET | Refills: 4 | Status: SHIPPED | OUTPATIENT
Start: 2021-11-09 | End: 2022-09-27

## 2021-11-09 NOTE — TELEPHONE ENCOUNTER
Medication requested: SUMAtriptan (IMITREX) 100 MG tablet  Last office visit: 6/30/21  [facility] appointments: none  Medication last refilled: 4/27/20 #9 + 11 refills  Last qualifying labs: n/a    Routing refill request to provider for review/approval because: Drug not on the [facility] refill protocol     Lesa Nayak MS RN-BC  11/09/21  12:16 PM

## 2022-02-07 NOTE — NURSING NOTE
Chief Complaint   Patient presents with     RECHECK     Osteopenia      Sandra Li CMA   Continue with the Tylenol as needed for the pain. If not improving, needs an appointment in the office. Caregiver states an understanding.

## 2022-03-15 ENCOUNTER — TRANSFERRED RECORDS (OUTPATIENT)
Dept: HEALTH INFORMATION MANAGEMENT | Facility: CLINIC | Age: 72
End: 2022-03-15

## 2022-05-16 DIAGNOSIS — M85.89 OSTEOPENIA OF MULTIPLE SITES: Primary | ICD-10-CM

## 2022-05-20 ENCOUNTER — ANCILLARY PROCEDURE (OUTPATIENT)
Dept: BONE DENSITY | Facility: CLINIC | Age: 72
End: 2022-05-20
Attending: INTERNAL MEDICINE
Payer: COMMERCIAL

## 2022-05-20 DIAGNOSIS — M85.89 OSTEOPENIA OF MULTIPLE SITES: ICD-10-CM

## 2022-05-20 PROCEDURE — 77080 DXA BONE DENSITY AXIAL: CPT

## 2022-06-04 ENCOUNTER — HEALTH MAINTENANCE LETTER (OUTPATIENT)
Age: 72
End: 2022-06-04

## 2022-06-07 ENCOUNTER — OFFICE VISIT (OUTPATIENT)
Dept: ENDOCRINOLOGY | Facility: CLINIC | Age: 72
End: 2022-06-07
Payer: COMMERCIAL

## 2022-06-07 VITALS
TEMPERATURE: 98.8 F | WEIGHT: 128.3 LBS | HEART RATE: 64 BPM | DIASTOLIC BLOOD PRESSURE: 59 MMHG | OXYGEN SATURATION: 99 % | BODY MASS INDEX: 21.38 KG/M2 | HEIGHT: 65 IN | SYSTOLIC BLOOD PRESSURE: 107 MMHG

## 2022-06-07 DIAGNOSIS — R73.03 PRE-DIABETES: Primary | ICD-10-CM

## 2022-06-07 DIAGNOSIS — M85.89 OSTEOPENIA OF MULTIPLE SITES: ICD-10-CM

## 2022-06-07 PROCEDURE — 99215 OFFICE O/P EST HI 40 MIN: CPT | Performed by: INTERNAL MEDICINE

## 2022-06-07 ASSESSMENT — PAIN SCALES - GENERAL: PAINLEVEL: NO PAIN (0)

## 2022-06-07 NOTE — NURSING NOTE
"Chief Complaint   Patient presents with     Osteoporosis     Vital signs:  Temp: 98.8  F (37.1  C) Temp src: Oral BP: 107/59 Pulse: 64     SpO2: 99 %     Height: 165.1 cm (5' 5\") Weight: 58.2 kg (128 lb 4.8 oz)  Estimated body mass index is 21.35 kg/m  as calculated from the following:    Height as of this encounter: 1.651 m (5' 5\").    Weight as of this encounter: 58.2 kg (128 lb 4.8 oz).          "

## 2022-06-07 NOTE — LETTER
6/7/2022       RE: Keven Lopez  [address]  [address]  [place] [address]     Dear Colleague,    Thank you for referring your patient, Keven Lopez, to the [facility] [place] at [facility]. Please see a copy of my visit note below.      This 72 year old woman was called for f/u of her pre-diabetes and osteopenia.  She was first here several years ago to discuss dietary management of diabetes.  At that time, she said she changed her diet to a vegan diet because she was found to have diabetes based on a fasting sugar over 126.  She lost about 10 lbs on this diet and her diabetes resolved.  She had maintained that diet for several years and kept her A1cs in the 5 % range.    Most recently she had her A1c checked in [place].  This was done in December.  The value returned at 5.2.  She checks her blood sugars most mornings and reports that the values are about 90.  She continues to follow a plant-based diet.  She currently is following a plan that is comparable to the Shahzad Ornish diet.  Her weight has been stable.  She saw a retina specialist and a cardiologist while in [place] this winter.  Both of them are great proponents of the Shahzad Ornish diet.  She has been following this, primarily as a way to manage her LDL.  She does not want to go on a statin.  She reports that she ranges from 80-1 10 and her LDL.    With respect to her osteopenia, she is getting about 1000 mg of calcium a day from her food.  She drinks 3 glasses of soy milk a day at a minimum.  She also takes vitamin D 1000 international units once a day.  She recently had her DEXA scan repeated and the results are below.  She is concerned that her lumbar spine has fallen below the -2.5 level.  She reports she had an accident about a year ago and she was unable to do her weight lifting activity for many months.  She has now returned to doing  "that.  She also doing a lot of yoga.  She is very reluctant to consider taking medications for osteoporosis and is hoping that the weight bearing exercise will help with her bones.  She reminds me that none of her relatives have fractured a hip, although 1 grandmother did have a compression fracture.    She continues on 88 mcg of levothyroxine each day.  Her primary care doctor follows this and she will see the primary care doctor soon.    Today she reports she feels well.      Current Outpatient Medications   Medication     brimonidine (ALPHAGAN) 0.2 % ophthalmic solution     cyanocobalamin (VITAMIN B-12) 1000 MCG SUBL sublingual tablet     estradiol (VAGIFEM) 10 MCG TABS vaginal tablet     gabapentin (NEURONTIN) 300 MG capsule     latanoprost (XALATAN) 0.005 % ophthalmic solution     levothyroxine (SYNTHROID/LEVOTHROID) 88 MCG tablet     medical cannabis (Patient's own supply.  Not a prescription)     medical cannabis (Patient's own supply.  Not a prescription)     metroNIDAZOLE 0.75 % LOTN     polyethylene glycol-propylene glycol (SYSTANE ULTRA) 0.4-0.3 % SOLN ophthalmic solution     SUMAtriptan (IMITREX) 100 MG tablet     fluocinonide (LIDEX) 0.05 % external solution     frovatriptan (FROVA) 2.5 MG tablet     Magnesium 400 MG CAPS     No current facility-administered medications for this visit.     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    /59 (BP Location: Right arm, Patient Position: Sitting, Cuff Size: Adult Regular)   Pulse 64   Temp 98.8  F (37.1  C) (Oral)   Ht 1.651 m (5' 5\")   Wt 58.2 kg (128 lb 4.8 oz)   SpO2 99%   BMI 21.35 kg/m    She is in NAD.  Mood is upbeat    ENDO CALCIUM LABS-[facility] Latest Ref Rng & Units 6/30/2021   25 " OH VIT D TOTAL 30 - 75 ug/L    25 OH VIT D2 ug/L    25 OH VIT D3 ug/L    VITAMIN D DEFICIENCY SCREENING 20 - 75 ug/L    ALBUMIN 3.4 - 5.0 g/dL 3.7   ALKPHOS 40 - 150 U/L 76   CALCIUM 8.5 - 10.1 mg/dL 9.2   CALCIUM URINE G/24 H 0.10 - 0.30 g/24 h    CALCIUM URINE G/G CR g/g Cr    CALCIUM URINE MG/DL mg/dL    CREATININE 0.52 - 1.04 mg/dL 0.76   MAGNESIUM 1.6 - 2.3 mg/dL    PARATHYROID HORMONE INTACT 12 - 72 pg/mL            Order  Dexa hip/pelvis/spine [[id number]] (Order [id number])      Exam Information    Exam Date Exam Time Accession # Performing Department Results    5/20/22  1:17 PM [id number] [facility] [place]      PACS Images     Show images for Dexa hip/pelvis/spine           Study Result    Narrative & Impression      [facility] - [address] [address]  Phone: [phone]   Fax: [phone]        Patient name:                          Keven Lopez  Patient demographics:            72 year old  Female   History:                                    Evaluation of Bone Density, History of Fracture, Post Menopausal and Thyroid Condition  Current treatments:                 Antiseizure medications, Estrogen, Thyroid Medications, Vitamin D  Scan:                                       StormPins        Impression  The most negative and valid T-score of -2.7 at the level of the lumbar spine corresponds with osteoporosis according to WHO criteria for postmenopausal females and men age 50 and over. The risk of osteoporotic fracture increases approximately 2-fold for each 1.0 SD decrease in T-score.     Results   Lumbar spine   T-score -2.7 , BMD 0.853 g/cm2.      Left Femur neck  T-score -1.1 , BMD 0.886 g/cm2.  Left Total hip  T-score -0.9 , BMD 0.893 g/cm2.     Right Femur neck  T-score -1.0, BMD  0.894 g/cm2.  Right Total hip  T-score -0.8 , BMD  0.912 g/cm2.     Interval change  No  prior study available for comparison      Fracture risk   The risk of osteoporotic fracture increases approximately 2-fold for each 1.0 SD decrease in T-score.  Low bone density is not the only risk factor for fracture; consider factors such as patient's age, fall risk, injury risk, previous osteoporotic fracture, family history of osteoporosis, etc.       Repeat  Recommend repeat DXA in 2 years.  For patients eligible for Medicare, routine testing is allowed once every 2 years.      Clinical correlation recommended      Technical quality  Satisfactory.      Principal result :  Nichole Zayas MD, [facility]  Division of Endocrinology and Diabetes    Department of Medicine        References:  Ref. 1. WHO categories:          T-score > -1.0  = normal             .                                T-score -1.0 to -2.5 = low bone density  T-score < -2.5 = osteoporosis        .     Ref. 2. 2015 ISCD official position statements:  www.iscd.org.     Ref. 3.  Today's examination is compared to the technically similar prior study of the total hip and femur if available. Only changes deemed likely to be significant based on historical data are reported.  According to the ISCD position statements, total hip rather than femoral neck regions are to be compared because larger areas give better precision.  LSC = least significant changes at the [facility] (historical data)                          AP spine =  0.032 g/cm2  (6/26/2007)                          Left hip = 0.029 g/cm2  (6/15/2007)                          Right hip = 0.018 g/cm2  (6/15/2007)                          Left mid radius = 0.043 g/cm2  (6/26/2007)  Please note that the differential diagnosis of increase in bone density at the lumbar spine includes improvement due to pharmacotherapy vs inter-current progression of spine degeneration or fracture.     Ref. 4 Fracture risk is calculated in patients aged 40 to 90 years old with low bone density  not on osteoporosis treatment. A 10 year fracture risk of 3% and higher for hip fracture and 20% and higher for major osteoporotic fracture is considered higher than acceptable risk and might be an indication for medical treatment.     Ref. 5.  By definition, osteoporosis may be diagnosed in the presence or with the history of a low trauma or fragility fracture.  Fragility and low trauma fracture is defined as a fracture resulting from the force of a fall from a standing height or less or a bone that breaks under conditions that would not cause a normal bone to break.       Ref. 6. NOF Physician's Guideline Website address:  www.nof.org.                  Assessment and plan:    1.prediabetes.  She continues to follow a low calorie diet and has had stable weight for some time.  Her most recent A1c is in the normal range.  She has not checked about once a year but checks her fasting glucose most days.  I recommended she continue to do this and follow-up with her primary care doctor.    2.osteopenia.  Based on the most recent DEXA, she has moved from osteopenic to osteoporotic status at her lumbar spine only.  I reviewed the imaging and do not think she has a lumbar compression fracture that has changed the DEXA.  I note that her hips are just fine.  She is right on the end of needing to take a drug to reduce fracture risk in the future.  She is very reluctant to do so.  I encouraged her to continue to get adequate calcium and vitamin D, which the laboratory data suggest she is doing.  Also encouraged her to continue with weightbearing exercise.  We will plan to repeat the DEXA in 2 years and decide if she should start treatment then.    3.CVD risk.  She is trying to manage her LDL with diet.  She is following up with her primary care doctor for this.    4.hypothyroidism.  She has been on a stable dose of T4 for some time.  She will see her primary care doctor to have her TSH checked soon.    I will see her back in 2  years.    I spent 25 minutes with the patient today.  On the day of visit I spent an additional 20 minutes reviewing and interpreting her lab data, reviewing her chart, reviewing and individually interpreting her DEXA scan, and doing documentation.      Salena Vann MD

## 2022-06-07 NOTE — PROGRESS NOTES
This 72 year old woman was called for f/u of her pre-diabetes and osteopenia.  She was first here several years ago to discuss dietary management of diabetes.  At that time, she said she changed her diet to a vegan diet because she was found to have diabetes based on a fasting sugar over 126.  She lost about 10 lbs on this diet and her diabetes resolved.  She had maintained that diet for several years and kept her A1cs in the 5 % range.    Most recently she had her A1c checked in [place].  This was done in December.  The value returned at 5.2.  She checks her blood sugars most mornings and reports that the values are about 90.  She continues to follow a plant-based diet.  She currently is following a plan that is comparable to the Shahzad Ornish diet.  Her weight has been stable.  She saw a retina specialist and a cardiologist while in [place] this winter.  Both of them are great proponents of the Shahzad Ornish diet.  She has been following this, primarily as a way to manage her LDL.  She does not want to go on a statin.  She reports that she ranges from 80-1 10 and her LDL.    With respect to her osteopenia, she is getting about 1000 mg of calcium a day from her food.  She drinks 3 glasses of soy milk a day at a minimum.  She also takes vitamin D 1000 international units once a day.  She recently had her DEXA scan repeated and the results are below.  She is concerned that her lumbar spine has fallen below the -2.5 level.  She reports she had an accident about a year ago and she was unable to do her weight lifting activity for many months.  She has now returned to doing that.  She also doing a lot of yoga.  She is very reluctant to consider taking medications for osteoporosis and is hoping that the weight bearing exercise will help with her bones.  She reminds me that none of her relatives have fractured a hip, although 1 grandmother did have a compression fracture.    She continues on 88 mcg of levothyroxine each day.  " Her primary care doctor follows this and she will see the primary care doctor soon.    Today she reports she feels well.      Current Outpatient Medications   Medication     brimonidine (ALPHAGAN) 0.2 % ophthalmic solution     cyanocobalamin (VITAMIN B-12) 1000 MCG SUBL sublingual tablet     estradiol (VAGIFEM) 10 MCG TABS vaginal tablet     gabapentin (NEURONTIN) 300 MG capsule     latanoprost (XALATAN) 0.005 % ophthalmic solution     levothyroxine (SYNTHROID/LEVOTHROID) 88 MCG tablet     medical cannabis (Patient's own supply.  Not a prescription)     medical cannabis (Patient's own supply.  Not a prescription)     metroNIDAZOLE 0.75 % LOTN     polyethylene glycol-propylene glycol (SYSTANE ULTRA) 0.4-0.3 % SOLN ophthalmic solution     SUMAtriptan (IMITREX) 100 MG tablet     fluocinonide (LIDEX) 0.05 % external solution     frovatriptan (FROVA) 2.5 MG tablet     Magnesium 400 MG CAPS     No current facility-administered medications for this visit.     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    /59 (BP Location: Right arm, Patient Position: Sitting, Cuff Size: Adult Regular)   Pulse 64   Temp 98.8  F (37.1  C) (Oral)   Ht 1.651 m (5' 5\")   Wt 58.2 kg (128 lb 4.8 oz)   SpO2 99%   BMI 21.35 kg/m    She is in NAD.  Mood is upbeat    ENDO CALCIUM LABS-[facility] Latest Ref Rng & Units 6/30/2021   25 OH VIT D TOTAL 30 - 75 ug/L    25 OH VIT D2 ug/L    25 OH VIT D3 ug/L    VITAMIN D DEFICIENCY SCREENING 20 - 75 ug/L    ALBUMIN 3.4 - 5.0 g/dL 3.7   ALKPHOS 40 - 150 U/L 76   CALCIUM 8.5 - 10.1 mg/dL 9.2   CALCIUM URINE G/24 H 0.10 - 0.30 g/24 h    CALCIUM URINE G/G CR g/g Cr    CALCIUM URINE MG/DL mg/dL    CREATININE 0.52 - 1.04 mg/dL 0.76   MAGNESIUM " 1.6 - 2.3 mg/dL    PARATHYROID HORMONE INTACT 12 - 72 pg/mL            Order  Dexa hip/pelvis/spine [[id number]] (Order [id number])      Exam Information    Exam Date Exam Time Accession # Performing Department Results    5/20/22  1:17 PM [id number] [facility]      PACS Images     Show images for Dexa hip/pelvis/spine           Study Result    Narrative & Impression      [facility] - [address] [address]  Phone: [phone]   Fax: [phone]        Patient name:                          Keven Lopez  Patient demographics:            72 year old  Female   History:                                    Evaluation of Bone Density, History of Fracture, Post Menopausal and Thyroid Condition  Current treatments:                 Antiseizure medications, Estrogen, Thyroid Medications, Vitamin D  Scan:                                       [employer]        Impression  The most negative and valid T-score of -2.7 at the level of the lumbar spine corresponds with osteoporosis according to WHO criteria for postmenopausal females and men age 50 and over. The risk of osteoporotic fracture increases approximately 2-fold for each 1.0 SD decrease in T-score.     Results   Lumbar spine   T-score -2.7 , BMD 0.853 g/cm2.      Left Femur neck  T-score -1.1 , BMD 0.886 g/cm2.  Left Total hip  T-score -0.9 , BMD 0.893 g/cm2.     Right Femur neck  T-score -1.0, BMD  0.894 g/cm2.  Right Total hip  T-score -0.8 , BMD  0.912 g/cm2.     Interval change  No prior study available for comparison      Fracture risk   The risk of osteoporotic fracture increases approximately 2-fold for each 1.0 SD decrease in T-score.  Low bone density is not the only risk factor for fracture; consider factors such as patient's age, fall risk, injury risk, previous osteoporotic fracture, family history of osteoporosis, etc.        Repeat  Recommend repeat DXA in 2 years.  For patients eligible for Medicare, routine testing is allowed once every 2 years.      Clinical correlation recommended      Technical quality  Satisfactory.      Principal result :  Nichole Zayas MD, [facility]  Division of Endocrinology and Diabetes    Department of Medicine        References:  Ref. 1. WHO categories:          T-score > -1.0  = normal             .                                T-score -1.0 to -2.5 = low bone density  T-score < -2.5 = osteoporosis        .     Ref. 2. 2015 ISCD official position statements:  www.iscd.org.     Ref. 3.  Today's examination is compared to the technically similar prior study of the total hip and femur if available. Only changes deemed likely to be significant based on historical data are reported.  According to the ISCD position statements, total hip rather than femoral neck regions are to be compared because larger areas give better precision.  LSC = least significant changes at the [facility] (historical data)                          AP spine =  0.032 g/cm2  (6/26/2007)                          Left hip = 0.029 g/cm2  (6/15/2007)                          Right hip = 0.018 g/cm2  (6/15/2007)                          Left mid radius = 0.043 g/cm2  (6/26/2007)  Please note that the differential diagnosis of increase in bone density at the lumbar spine includes improvement due to pharmacotherapy vs inter-current progression of spine degeneration or fracture.     Ref. 4 Fracture risk is calculated in patients aged 40 to 90 years old with low bone density not on osteoporosis treatment. A 10 year fracture risk of 3% and higher for hip fracture and 20% and higher for major osteoporotic fracture is considered higher than acceptable risk and might be an indication for medical treatment.     Ref. 5.  By definition, osteoporosis may be diagnosed in the presence or with the history of a low trauma or fragility  fracture.  Fragility and low trauma fracture is defined as a fracture resulting from the force of a fall from a standing height or less or a bone that breaks under conditions that would not cause a normal bone to break.       Ref. 6. NOF Physician's Guideline Website address:  www.nof.org.                  Assessment and plan:    1.prediabetes.  She continues to follow a low calorie diet and has had stable weight for some time.  Her most recent A1c is in the normal range.  She has not checked about once a year but checks her fasting glucose most days.  I recommended she continue to do this and follow-up with her primary care doctor.    2.osteopenia.  Based on the most recent DEXA, she has moved from osteopenic to osteoporotic status at her lumbar spine only.  I reviewed the imaging and do not think she has a lumbar compression fracture that has changed the DEXA.  I note that her hips are just fine.  She is right on the end of needing to take a drug to reduce fracture risk in the future.  She is very reluctant to do so.  I encouraged her to continue to get adequate calcium and vitamin D, which the laboratory data suggest she is doing.  Also encouraged her to continue with weightbearing exercise.  We will plan to repeat the DEXA in 2 years and decide if she should start treatment then.    3.CVD risk.  She is trying to manage her LDL with diet.  She is following up with her primary care doctor for this.    4.hypothyroidism.  She has been on a stable dose of T4 for some time.  She will see her primary care doctor to have her TSH checked soon.    I will see her back in 2 years.    I spent 25 minutes with the patient today.  On the day of visit I spent an additional 20 minutes reviewing and interpreting her lab data, reviewing her chart, reviewing and individually interpreting her DEXA scan, and doing documentation.      Salena Vann MD

## 2022-07-08 NOTE — PATIENT INSTRUCTIONS
Go to pharmacy for Td booster    Patient Education   Personalized Prevention Plan  You are due for the preventive services outlined below.  Your care team is available to assist you in scheduling these services.  If you have already completed any of these items, please share that information with your care team to update in your medical record.  Health Maintenance Due   Topic Date Due    Discuss Advance Care Planning  Never done    Mammogram  12/03/2019    Diptheria Tetanus Pertussis (DTAP/TDAP/TD) Vaccine (2 - Td or Tdap) 11/28/2021    FALL RISK ASSESSMENT  06/30/2022     Preventive Health Recommendations    See your health care provider every year to  Review health changes.   Discuss preventive care.    Review your medicines if your doctor has prescribed any.  You no longer need a yearly Pap test unless you've had an abnormal Pap test in the past 10 years. If you have vaginal symptoms, such as bleeding or discharge, be sure to talk with your provider about a Pap test.  Every 1 to 2 years, have a mammogram.  If you are over 69, talk with your health care provider about whether or not you want to continue having screening mammograms.  Every 10 years, have a colonoscopy. Or, have a yearly FIT test (stool test). These exams will check for colon cancer.   Have a cholesterol test every 5 years, or more often if your doctor advises it.   Have a diabetes test (fasting glucose) every three years. If you are at risk for diabetes, you should have this test more often.   At age 65, have a bone density scan (DEXA) to check for osteoporosis (brittle bone disease).    Shots:  Get a flu shot each year.  Get a tetanus shot every 10 years.  Talk to your doctor about your pneumonia vaccines. There are now two you should receive - Pneumovax (PPSV 23) and Prevnar (PCV 13).  Talk to your pharmacist about the shingles vaccine.  Talk to your doctor about the hepatitis B vaccine.    Nutrition:   Eat at least 5 servings of fruits and  vegetables each day.  Eat whole-grain bread, whole-wheat pasta and brown rice instead of white grains and rice.  Get adequate Calcium and Vitamin D.     Lifestyle  Exercise at least 150 minutes a week (30 minutes a day, 5 days a week). This will help you control your weight and prevent disease.  Limit alcohol to one drink per day.  No smoking.   Wear sunscreen to prevent skin cancer.   See your dentist twice a year for an exam and cleaning.  See your eye doctor every 1 to 2 years to screen for conditions such as glaucoma, macular degeneration and cataracts.    Personalized Prevention Plan  You are due for the preventive services outlined below.  Your care team is available to assist you in scheduling these services.  If you have already completed any of these items, please share that information with your care team to update in your medical record.  Health Maintenance   Topic Date Due    ADVANCE CARE PLANNING  Never done    MAMMO SCREENING  12/03/2019    DTAP/TDAP/TD IMMUNIZATION (2 - Td or Tdap) 11/28/2021    FALL RISK ASSESSMENT  06/30/2022    INFLUENZA VACCINE (1) 09/01/2022    MEDICARE ANNUAL WELLNESS VISIT  07/11/2023    DEXA  05/20/2025    LIPID  06/30/2026    COLORECTAL CANCER SCREENING  07/24/2028    HEPATITIS C SCREENING  Completed    PHQ-2 (once per calendar year)  Completed    Pneumococcal Vaccine: 65+ Years  Completed    ZOSTER IMMUNIZATION  Completed    COVID-19 Vaccine  Completed    IPV IMMUNIZATION  Aged Out    MENINGITIS IMMUNIZATION  Aged Out    HEPATITIS B IMMUNIZATION  Aged Out

## 2022-07-09 ASSESSMENT — ENCOUNTER SYMPTOMS
JOINT SWELLING: 1
BREAST MASS: 0
PARESTHESIAS: 1
NAUSEA: 0
COUGH: 0
MYALGIAS: 0
PALPITATIONS: 1
HEMATURIA: 0
SHORTNESS OF BREATH: 0
FREQUENCY: 0
CHILLS: 0
WEAKNESS: 0
DYSURIA: 0
HEMATOCHEZIA: 0
SORE THROAT: 0
EYE PAIN: 0
HEADACHES: 1
CONSTIPATION: 0
ARTHRALGIAS: 1
HEARTBURN: 0
DIARRHEA: 0
ABDOMINAL PAIN: 0
DIZZINESS: 0
FEVER: 0
NERVOUS/ANXIOUS: 0

## 2022-07-09 ASSESSMENT — ACTIVITIES OF DAILY LIVING (ADL): CURRENT_FUNCTION: NO ASSISTANCE NEEDED

## 2022-07-11 ENCOUNTER — OFFICE VISIT (OUTPATIENT)
Dept: FAMILY MEDICINE | Facility: CLINIC | Age: 72
End: 2022-07-11
Payer: COMMERCIAL

## 2022-07-11 ENCOUNTER — TELEPHONE (OUTPATIENT)
Dept: FAMILY MEDICINE | Facility: CLINIC | Age: 72
End: 2022-07-11

## 2022-07-11 VITALS
BODY MASS INDEX: 21.17 KG/M2 | WEIGHT: 127.08 LBS | SYSTOLIC BLOOD PRESSURE: 133 MMHG | DIASTOLIC BLOOD PRESSURE: 76 MMHG | OXYGEN SATURATION: 96 % | HEART RATE: 86 BPM | TEMPERATURE: 97.2 F | HEIGHT: 65 IN

## 2022-07-11 DIAGNOSIS — M81.0 OSTEOPOROSIS OF LUMBAR SPINE: ICD-10-CM

## 2022-07-11 DIAGNOSIS — Z00.00 ENCOUNTER FOR MEDICARE ANNUAL WELLNESS EXAM: Primary | ICD-10-CM

## 2022-07-11 DIAGNOSIS — Z78.9 VEGAN: ICD-10-CM

## 2022-07-11 DIAGNOSIS — E03.9 HYPOTHYROIDISM, UNSPECIFIED TYPE: ICD-10-CM

## 2022-07-11 DIAGNOSIS — N95.2 ATROPHIC VAGINITIS: ICD-10-CM

## 2022-07-11 DIAGNOSIS — R73.03 PREDIABETES: ICD-10-CM

## 2022-07-11 DIAGNOSIS — Z13.220 LIPID SCREENING: ICD-10-CM

## 2022-07-11 DIAGNOSIS — M54.50 NONSPECIFIC LOW BACK PAIN: ICD-10-CM

## 2022-07-11 RX ORDER — LEVOTHYROXINE SODIUM 88 UG/1
88 TABLET ORAL DAILY
Qty: 90 TABLET | Refills: 3 | Status: SHIPPED | OUTPATIENT
Start: 2022-07-11 | End: 2023-05-31

## 2022-07-11 RX ORDER — ESTRADIOL 10 UG/1
10 INSERT VAGINAL
Qty: 24 TABLET | Refills: 0 | Status: SHIPPED | OUTPATIENT
Start: 2022-07-11 | End: 2022-07-11

## 2022-07-11 RX ORDER — GABAPENTIN 300 MG/1
CAPSULE ORAL
Qty: 270 CAPSULE | Refills: 3 | COMMUNITY
Start: 2022-07-11

## 2022-07-11 RX ORDER — ESTRADIOL 10 UG/1
10 INSERT VAGINAL
Qty: 24 TABLET | Refills: 3 | Status: SHIPPED | OUTPATIENT
Start: 2022-07-11 | End: 2023-07-13

## 2022-07-11 NOTE — TELEPHONE ENCOUNTER
Pt followed up with her OBGYN. Pt reported that OB said Dr. Buckley could follow Pt for any OB/GYN needs    Rosa

## 2022-07-11 NOTE — PROGRESS NOTES
"Keven Lopez is a 73 yo woman with hx of migraine with aura, glaucoma, pre-diabetes, hpyothyroidism, osteoporosis of lumbar spine. She is here for a preventive exam.  She is not fasting. She is up to date on eye exams-glaucoma and retinal specialist (winter 2021, [place] doctor) and dental visits. Difficulty getting regular appointments to monitor her glaucoma. Has noticed that her night vision is \"not great\" and thinks she will likely need cataract surgery at some point. Wears seat belt- yes. Bike helmet- yes.       HCM  Advanced Directive: none on file, will send in copy  COVID vaccine series: 4/4 completed  Other vaccinations Td due, go to pharmacy for this vaccine  Mammogram: last done 3/14/22 (in [place]) - normal  Colonoscopy: completed 2018, repeat 2028 if indicated  DEXA completed 5/2022, osteoporosis lumbar spine T-2.7, declined medication    Hearing concerns: none  Fall Risk: none  Independent at home: yes  Safe : yes  Memory concerns: none    COGNITIVE SCREEN  1) Repeat 3 items (Banana, Sunrise, Chair)    2) Clock draw: NORMAL  3) 3 item recall: Recalls 3 objects  Results: 3 items recalled: COGNITIVE IMPAIRMENT LESS LIKELY    Mini-CogTM Copyright S Raiza. Licensed by the author for use in [employer]; reprinted with permission (kelsi@.[email or web address]). All rights reserved.      Diet: IrishganShahzad diet  Wt Readings from Last 4 Encounters:   07/11/22 57.6 kg (127 lb 1.3 oz)   06/07/22 58.2 kg (128 lb 4.8 oz)   09/24/21 60.3 kg (133 lb)   06/30/21 59.4 kg (131 lb)     Body mass index is 20.94 kg/m .    Prediabetes   Debbie recently had her A1c checked in [place] and it was low. Family hx of diabetes. Eats a vegan diet. Due for A1c.    Osteoporosis   Most recent DEXA Scan found most negative T-score of -2.7 in lumbar spine indicative of osteoporosis; she declines medications but takes in 1200+mg of calcium daily and takes Vitamin D supplement. Does yoga and weights. " "    Hypothyroidism  Currently taking 88mcg daily dose, consistent with dosing. Due for labs, needs med refill.    Itchy Eyes  Sami reports upper eyelid margin has been \"red and itchy\". Wonders if this could be caused by her rosacea. Endorses use of dry-eye drops. Denies purulent drainage or crusting.    Migraine headaches  Debbie has a longstanding hx of migraines. Frequency is 2x/month. Sometimes goes 2-3 months without one. Endorses aura. Uses sumatriptan as rescue medication.     Was prescribed gabapentin following a collarbone fracture and reports it reduced frequency of migraine headaches. Continues with 300mg am, 600mg hs dosing.  Also uses CBD, 25mg q hs.     Low Back Pain  Sami reports that she \"threw out\" her back this morning. While bending over  she felt a sharp pain in her R lower back. No radiation to buttock or leg. No paresthesia. Feels muscle tension. Pain is triggered by twisting.     Sami has been using yoga to relieve muscle stiffness in her lower back, which works well for her. She is concerned that her back pain could be related to her recently diagnosed osteoporosis. She has not taken any medication for pain management.    Atrophic vaginitis  Uses Vagifem for vaginal dryness, no bleeding. Started taking after a bout of hematuria. Had full urologic evaluation and was prescribed estrogen cream for friable mucosa. Switched to vagifem tablets, preferable, less messy. No recurrence of urinary symptoms. She would like medication refill.     Health Maintenance   Topic Date Due     ADVANCE CARE PLANNING  Never done     MAMMO SCREENING  12/03/2019     DTAP/TDAP/TD IMMUNIZATION (2 - Td or Tdap) 11/28/2021     INFLUENZA VACCINE (1) 09/01/2022     MEDICARE ANNUAL WELLNESS VISIT  07/11/2023     FALL RISK ASSESSMENT  07/11/2023     DEXA  05/20/2025     LIPID  06/30/2026     COLORECTAL CANCER SCREENING  07/24/2028     HEPATITIS C SCREENING  Completed     PHQ-2 (once per calendar year)  Completed     " "Pneumococcal Vaccine: 65+ Years  Completed     ZOSTER IMMUNIZATION  Completed     COVID-19 Vaccine  Completed     IPV IMMUNIZATION  Aged Out     MENINGITIS IMMUNIZATION  Aged Out     HEPATITIS B IMMUNIZATION  Aged Out         Patient Active Problem List   Diagnosis     Migraine with aura and without status migrainosus, not intractable     Glaucoma     Pre-diabetes     Leg cramps     Muscle twitching     Hypothyroidism     Family history of malignant neoplasm of breast     Osteoporosis of lumbar spine       Past Surgical History:   Procedure Laterality Date     BREAST BIOPSY, RT/LT       CLAVICLE SURGERY Left 12/21/2020    surgery in [place], pinning, fixation     COLONOSCOPY  02/27/2012    Procedure:COLONOSCOPY; COLONOSCOPY; Surgeon:JLUIS HUNTER; Location: GI     SEPTOPLASTY, TURBINOPLASTY, COMBINED  05/24/2012    Procedure:COMBINED SEPTOPLASTY, TURBINOPLASTY; Septoplasty, Submuccosal Resection Turbinates; Surgeon:AARON ATKINSON; Location:[facility]     urethral stricture dilatation         Family History   Problem Relation Age of Onset     Bipolar Disorder Mother      Parkinsonism Mother      Asthma Mother      Pulmonary Embolism Mother      Diabetes Father      Lymphoma Father         CLL     Hypertension Father      Hyperlipidemia Father      Migraines Father      Diabetes Sister         prediabetes     Migraines Sister      Breast Cancer Sister 60     Migraines Sister      Diabetes Type 2  Sister         insulin dependent     Migraines Brother      Diabetes Brother      Hearing Loss Brother      Diabetes Paternal Grandmother      Parkinsonism Maternal Uncle         x 2 brothers     Social  , no children  Retired RN and psychologist     HABITS:  Tob: none  ETOH: none, \"it's a major migraine trigger\"  Calcium: Soy milk 3/day + Vitamin D 1000IU per day  B12 supplement 1000 mcg daily  Caffeine: decaf  Exercise: 4-5 x per week     OB/GYN HISTORY:  LMP: Post-menopausal   Hx abnormal pap?  Remote " past, most recent in 2017 normal with negative HPV  Vasomotor sx:  occasional. Lately she has been having them more frequently.   Urinary sx: no  G 0   P 0   A 0  Self Breast exam:  yes    Current Outpatient Medications   Medication Sig Dispense Refill     brimonidine (ALPHAGAN) 0.2 % ophthalmic solution PLACE 1 DROP INTO BOTH EYES TWICE DAILY       cyanocobalamin (VITAMIN B-12) 1000 MCG SUBL sublingual tablet Place 1 tablet (1,000 mcg) under the tongue daily 90 tablet 3     estradiol (VAGIFEM) 10 MCG TABS vaginal tablet Place 1 tablet (10 mcg) vaginally twice a week 24 tablet 0     gabapentin (NEURONTIN) 300 MG capsule Take 300mg in am , 600mg in evening, doctor in [facility] prescribes 270 capsule 3     latanoprost (XALATAN) 0.005 % ophthalmic solution INSTILL 1 DROP INTO BOTH EYES AT BEDTIME AS DIRECTED.  4     levothyroxine (SYNTHROID/LEVOTHROID) 88 MCG tablet Take 1 tablet (88 mcg) by mouth daily 90 tablet 3     medical cannabis (Patient's own supply.  Not a prescription) Take by mouth 2 times daily (This is NOT a prescription, and does not certify that the patient has a qualifying medical condition for medical cannabis.  The purpose of this order is  to document that the patient reports taking medical cannabis.)       medical cannabis (Patient's own supply.  Not a prescription) by Other route See Admin Instructions (This is NOT a prescription, and does not certify that the patient has a qualifying medical condition for medical cannabis.  The purpose of this order is  to document that the patient reports taking medical cannabis.)       metroNIDAZOLE 0.75 % LOTN Externally apply  topically daily. Indications: Acne of Unknown cause Usually in Older Age Groups       polyethylene glycol-propylene glycol (SYSTANE ULTRA) 0.4-0.3 % SOLN ophthalmic solution Place 1 drop into both eyes every hour as needed.       SUMAtriptan (IMITREX) 100 MG tablet Take one at onset of headache . May repeat in 2 hr if needed , max 200mg in 24  "hr 9 tablet 4     No Known Allergies      ROS  CONSTITUTIONAL:NEGATIVE for fever, chills, change in weight  INTEGUMENTARY/SKIN: NEGATIVE for worrisome rashes, moles or lesions  EYES: NEGATIVE for vision changes or irritation, upper eyelid itching, as above, hx glaucoma, retinal issues  ENT/MOUTH: NEGATIVE for ear, mouth and throat problems  RESP:NEGATIVE for significant cough or SOB  BREAST: NEGATIVE for masses, tenderness or discharge  CV: NEGATIVE for chest pain, palpitations, VASQUEZ, orthopnea, PND  or peripheral edema  GI: NEGATIVE for nausea, abdominal pain, heartburn, or change in bowel habits, previously had diarrhea, linked to Mg supplement, now resolved  :NEGATIVE for frequency, dysuria, or hematuria  MUSCULOSKELETAL:arthralgias  NEURO: NEGATIVE for weakness, dizziness . + paresthesias post op after left clavicle fracture repair, hx of migraines   ENDOCRINE: NEGATIVE for polyuria/dipsia,  temperature intolerance, skin/hair changes. + prediabetes, thyroid disease, osteoporosis  HEME/ALLERGY/IMMUNE: NEGATIVE for bleeding problems  PSYCHIATRIC: NEGATIVE for changes in mood or affect    EXAM  /76   Pulse 86   Temp 97.2  F (36.2  C)   Ht 1.659 m (5' 5.32\")   Wt 57.6 kg (127 lb 1.3 oz)   SpO2 96%   BMI 20.94 kg/m      GENERAL APPEARANCE: Alert, pleasant, NAD  EYES: PERRL, EOMI, conjunctiva clear. Eye lid margins without mattering or significant redness, no thickening.   HENT: TM normal bilaterally. Nose and mouth masked  NECK: no adenopathy, thyroid normal to palpation   RESP: lungs clear to auscultation bilaterally,   BREAST: normal without masses, no tenderness or nipple discharge and no palpable axillary masses or adenopathy   CV: regular rate and rhythm, normal S1 S2, no murmur, no carotid bruits  ABDOMEN: soft, nontender, without HSM or masses. Bowel sounds normal  MS: extremities normal- no gross deformities noted, no tender, hot or swollen joints.    SKIN: no suspicious lesions or rashes  NEURO: " Normal strength and tone, sensory exam grossly normal, DTR normoreflexive in upper and lower extremities  PSYCH: mentation appears normal. and affect normal/bright.  EXT: no peripheral edema, pedal pulses palpable    Assessment:  (Z00.00) Encounter for Medicare annual wellness exam  (primary encounter diagnosis)  Comment: 72 year old female in good health.  Plan: Today we discussed ways to maintain a healthy lifestyle with sensible eating, regular exercise and self cares. We dicussed calcium and Vitamin D intake, vaccinations and preventive health screens.  Go to pharmacy for Td booster. Return health care directive.    (E03.9) Hypothyroidism, unspecified type  Comment: Due for repeat labs. Compliant with meds.   Plan: levothyroxine (SYNTHROID/LEVOTHROID) 88 MCG         tablet, TSH with free T4 reflex        Levothyroxine refilled. TSH completed today.    (M81.0) Osteoporosis of lumbar spine  Comment: T -2.7 at lumbar spine, declines medication.   Plan: continue with calcium/D intake, weight bearing exercise    (Z13.220) Lipid screening  Comment: not fasting, wishes to return for fasting labs  Plan: Lipid Profile    (N95.2) Atrophic vaginitis  Comment: estradiol cream is working well for her.  Plan: estradiol (VAGIFEM) 10 MCG TABS vaginal tablet        Refilled x 1 yr    (Z78.9) Vegan  Comment: currently eating vegan diet, taking B12 and D supplements  Plan: Ferritin, Vitamin B12        Check for vitamin deficiencies.    (R73.03) Prediabetes  Comment: A1c is due, numbers have been in nondiabetic range for the past 4 yrs.   Plan: Hemoglobin A1c        Continue with current diet, exercise plan.    (M54.50) Nonspecific low back pain  Comment: no tenderness with palpation over bony thoracic or lumbar spine.  Compression fracture unlikely, suspect strain of lower thoracic spine  Plan: recommend gentle stretches, massage, she meets with chiropractor and will follow up if  Not improving over next 2-4 wks.       Raven  "MD [facility]  Internal Medicine/Pediatrics      I, Zaira Shah, am serving as a scribe to document services personally performed by Dr. Raven Buckley, based on data collection and the provider's statements to me. Dr. Buckley has reviewed, edited, and approved the above note.   Answers for HPI/ROS submitted by the patient on 7/9/2022  In general, how would you rate your overall physical health?: excellent  Frequency of exercise:: 4-5 days/week  Do you usually eat at least 4 servings of fruit and vegetables a day, include whole grains & fiber, and avoid regularly eating high fat or \"junk\" foods? : Yes  Taking medications regularly:: Yes  Medication side effects:: Not applicable  Activities of Daily Living: no assistance needed  Home safety: no safety concerns identified  Hearing Impairment:: no hearing concerns  In the past 6 months, have you been bothered by leaking of urine?: No  abdominal pain: No  Blood in stool: No  Blood in urine: No  chest pain: No  chills: No  congestion: No  constipation: No  cough: No  diarrhea: No  dizziness: No  ear pain: No  eye pain: No  nervous/anxious: No  fever: No  frequency: No  genital sores: No  headaches: Yes  hearing loss: No  heartburn: No  arthralgias: Yes  joint swelling: Yes  peripheral edema: No  mood changes: No  myalgias: No  nausea: No  dysuria: No  palpitations: Yes  Skin sensation changes: Yes  sore throat: No  urgency: No  rash: No  shortness of breath: No  visual disturbance: Yes  weakness: No  pelvic pain: No  vaginal bleeding: No  vaginal discharge: No  tenderness: No  breast mass: No  breast discharge: No  In general, how would you rate your overall mental or emotional health?: good  Additional concerns today:: Yes  Duration of exercise:: 45-60 minutes      "

## 2022-07-13 ENCOUNTER — LAB (OUTPATIENT)
Dept: LAB | Facility: CLINIC | Age: 72
End: 2022-07-13
Payer: COMMERCIAL

## 2022-07-13 DIAGNOSIS — E03.9 HYPOTHYROIDISM, UNSPECIFIED TYPE: ICD-10-CM

## 2022-07-13 DIAGNOSIS — Z78.9 VEGAN: ICD-10-CM

## 2022-07-13 DIAGNOSIS — Z13.220 LIPID SCREENING: ICD-10-CM

## 2022-07-13 DIAGNOSIS — R73.03 PREDIABETES: ICD-10-CM

## 2022-07-13 LAB
CHOLEST SERPL-MCNC: 199 MG/DL
FERRITIN SERPL-MCNC: 65 NG/ML (ref 11–328)
HBA1C MFR BLD: 5.5 % (ref 0–5.6)
HDLC SERPL-MCNC: 65 MG/DL
LDLC SERPL CALC-MCNC: 112 MG/DL
NONHDLC SERPL-MCNC: 134 MG/DL
TRIGL SERPL-MCNC: 109 MG/DL
TSH SERPL DL<=0.005 MIU/L-ACNC: 2.77 UIU/ML (ref 0.3–4.2)
VIT B12 SERPL-MCNC: 1390 PG/ML (ref 232–1245)

## 2022-07-13 PROCEDURE — 84443 ASSAY THYROID STIM HORMONE: CPT | Performed by: INTERNAL MEDICINE

## 2022-07-13 PROCEDURE — 80061 LIPID PANEL: CPT | Performed by: INTERNAL MEDICINE

## 2022-07-13 PROCEDURE — 82728 ASSAY OF FERRITIN: CPT | Mod: GZ | Performed by: INTERNAL MEDICINE

## 2022-07-13 PROCEDURE — 82607 VITAMIN B-12: CPT | Performed by: INTERNAL MEDICINE

## 2022-07-15 DIAGNOSIS — N95.2 ATROPHIC VAGINITIS: ICD-10-CM

## 2022-07-16 RX ORDER — ESTRADIOL 10 UG/1
10 INSERT VAGINAL
Qty: 24 TABLET | Refills: 3 | OUTPATIENT
Start: 2022-07-18

## 2022-07-22 ENCOUNTER — APPOINTMENT (OUTPATIENT)
Dept: URBAN - METROPOLITAN AREA CLINIC 255 | Age: 72
Setting detail: DERMATOLOGY
End: 2022-07-23

## 2022-07-22 DIAGNOSIS — D17 BENIGN LIPOMATOUS NEOPLASM: ICD-10-CM

## 2022-07-22 PROBLEM — D17.21 BENIGN LIPOMATOUS NEOPLASM OF SKIN AND SUBCUTANEOUS TISSUE OF RIGHT ARM: Status: ACTIVE | Noted: 2022-07-22

## 2022-07-22 PROCEDURE — OTHER EXCISION: OTHER

## 2022-07-22 PROCEDURE — OTHER MIPS QUALITY: OTHER

## 2022-07-22 PROCEDURE — 12031 INTMD RPR S/A/T/EXT 2.5 CM/<: CPT

## 2022-07-22 PROCEDURE — 11403 EXC TR-EXT B9+MARG 2.1-3CM: CPT

## 2022-07-22 ASSESSMENT — LOCATION ZONE DERM: LOCATION ZONE: ARM

## 2022-07-22 ASSESSMENT — LOCATION DETAILED DESCRIPTION DERM: LOCATION DETAILED: RIGHT PROXIMAL DORSAL FOREARM

## 2022-07-22 ASSESSMENT — LOCATION SIMPLE DESCRIPTION DERM: LOCATION SIMPLE: RIGHT FOREARM

## 2022-07-22 NOTE — PROCEDURE: MIPS QUALITY
Detail Level: Detailed
Quality 110: Preventive Care And Screening: Influenza Immunization: Influenza Immunization previously received during influenza season
Quality 130: Documentation Of Current Medications In The Medical Record: Current Medications Documented
Quality 111:Pneumonia Vaccination Status For Older Adults: Pneumococcal vaccine administered on or after patient’s 60th birthday and before the end of the measurement period
Quality 226: Preventive Care And Screening: Tobacco Use: Screening And Cessation Intervention: Patient screened for tobacco use and is an ex/non-smoker
Quality 431: Preventive Care And Screening: Unhealthy Alcohol Use - Screening: Patient not identified as an unhealthy alcohol user when screened for unhealthy alcohol use using a systematic screening method
Quality 265: Biopsy Follow-Up: Biopsy results reviewed, communicated, tracked, and documented

## 2022-07-22 NOTE — PROCEDURE: EXCISION
Slit Excision Additional Text (Leave Blank If You Do Not Want): A linear line was drawn on the skin overlying the lesion. An incision was made slowly until the lesion was visualized.  Once visualized, the lesion was removed with blunt dissection.
M-Plasty Intermediate Repair Preamble Text (Leave Blank If You Do Not Want): Undermining was performed with blunt dissection.
Banner Transposition Flap Text: The defect edges were debeveled with a #15 scalpel blade.  Given the location of the defect and the proximity to free margins a Banner transposition flap was deemed most appropriate.  Using a sterile surgical marker, an appropriate flap drawn around the defect. The area thus outlined was incised deep to adipose tissue with a #15 scalpel blade.  The skin margins were undermined to an appropriate distance in all directions utilizing iris scissors.
Advancement Flap (Single) Text: The defect edges were debeveled with a #15 scalpel blade.  Given the location of the defect and the proximity to free margins a single advancement flap was deemed most appropriate.  Using a sterile surgical marker, an appropriate advancement flap was drawn incorporating the defect and placing the expected incisions within the relaxed skin tension lines where possible.    The area thus outlined was incised deep to adipose tissue with a #15 scalpel blade.  The skin margins were undermined to an appropriate distance in all directions utilizing iris scissors.
Estimated Blood Loss (Cc): minimal
O-L Flap Text: The defect edges were debeveled with a #15 scalpel blade.  Given the location of the defect, shape of the defect and the proximity to free margins an O-L flap was deemed most appropriate.  Using a sterile surgical marker, an appropriate advancement flap was drawn incorporating the defect and placing the expected incisions within the relaxed skin tension lines where possible.    The area thus outlined was incised deep to adipose tissue with a #15 scalpel blade.  The skin margins were undermined to an appropriate distance in all directions utilizing iris scissors.
Medical Necessity Information: It is in your best interest to select a reason for this procedure from the list below. All of these items fulfill various CMS LCD requirements except lesion extends to a margin.
Did You Provide Opioid Counseling: No
Ftsg Text: The defect edges were debeveled with a #15 scalpel blade.  Given the location of the defect, shape of the defect and the proximity to free margins a full thickness skin graft was deemed most appropriate.  Using a sterile surgical marker, the primary defect shape was transferred to the donor site. The area thus outlined was incised deep to adipose tissue with a #15 scalpel blade.  The harvested graft was then trimmed of adipose tissue until only dermis and epidermis was left.  The skin margins of the secondary defect were undermined to an appropriate distance in all directions utilizing iris scissors.  The secondary defect was closed with interrupted buried subcutaneous sutures.  The skin edges were then re-apposed with running  sutures.  The skin graft was then placed in the primary defect and oriented appropriately.
Skin Substitute Text: The defect edges were debeveled with a #15 scalpel blade.  Given the location of the defect, shape of the defect and the proximity to free margins a skin substitute graft was deemed most appropriate.  The graft material was trimmed to fit the size of the defect. The graft was then placed in the primary defect and oriented appropriately.
Size Of Margin In Cm: 0.1
Helical Rim Text: The closure involved the helical rim.
Complex Repair And Dorsal Nasal Flap Text: The defect edges were debeveled with a #15 scalpel blade.  The primary defect was closed partially with a complex linear closure.  Given the location of the remaining defect, shape of the defect and the proximity to free margins a dorsal nasal flap was deemed most appropriate for complete closure of the defect.  Using a sterile surgical marker, an appropriate flap was drawn incorporating the defect and placing the expected incisions within the relaxed skin tension lines where possible.    The area thus outlined was incised deep to adipose tissue with a #15 scalpel blade.  The skin margins were undermined to an appropriate distance in all directions utilizing iris scissors.
Debridement Text: The wound edges were debrided prior to proceeding with the closure to facilitate wound healing.
Complex Repair And Modified Advancement Flap Text: The defect edges were debeveled with a #15 scalpel blade.  The primary defect was closed partially with a complex linear closure.  Given the location of the remaining defect, shape of the defect and the proximity to free margins a modified advancement flap was deemed most appropriate for complete closure of the defect.  Using a sterile surgical marker, an appropriate advancement flap was drawn incorporating the defect and placing the expected incisions within the relaxed skin tension lines where possible.    The area thus outlined was incised deep to adipose tissue with a #15 scalpel blade.  The skin margins were undermined to an appropriate distance in all directions utilizing iris scissors.
Rotation Flap Text: The defect edges were debeveled with a #15 scalpel blade.  Given the location of the defect, shape of the defect and the proximity to free margins a rotation flap was deemed most appropriate.  Using a sterile surgical marker, an appropriate rotation flap was drawn incorporating the defect and placing the expected incisions within the relaxed skin tension lines where possible.    The area thus outlined was incised deep to adipose tissue with a #15 scalpel blade.  The skin margins were undermined to an appropriate distance in all directions utilizing iris scissors.
Nasal Turnover Hinge Flap Text: The defect edges were debeveled with a #15 scalpel blade.  Given the size, depth, location of the defect and the defect being full thickness a nasal turnover hinge flap was deemed most appropriate.  Using a sterile surgical marker, an appropriate hinge flap was drawn incorporating the defect. The area thus outlined was incised with a #15 scalpel blade. The flap was designed to recreate the nasal mucosal lining and the alar rim. The skin margins were undermined to an appropriate distance in all directions utilizing iris scissors.
Suturegard Retention Suture: 2-0 Nylon
Complex Repair And Rhombic Flap Text: The defect edges were debeveled with a #15 scalpel blade.  The primary defect was closed partially with a complex linear closure.  Given the location of the remaining defect, shape of the defect and the proximity to free margins a rhombic flap was deemed most appropriate for complete closure of the defect.  Using a sterile surgical marker, an appropriate advancement flap was drawn incorporating the defect and placing the expected incisions within the relaxed skin tension lines where possible.    The area thus outlined was incised deep to adipose tissue with a #15 scalpel blade.  The skin margins were undermined to an appropriate distance in all directions utilizing iris scissors.
Posterior Auricular Interpolation Flap Text: A decision was made to reconstruct the defect utilizing an interpolation axial flap and a staged reconstruction.  A telfa template was made of the defect.  This telfa template was then used to outline the posterior auricular interpolation flap.  The donor area for the pedicle flap was then injected with anesthesia.  The flap was excised through the skin and subcutaneous tissue down to the layer of the underlying musculature.  The pedicle flap was carefully excised within this deep plane to maintain its blood supply.  The edges of the donor site were undermined.   The donor site was closed in a primary fashion.  The pedicle was then rotated into position and sutured.  Once the tube was sutured into place, adequate blood supply was confirmed with blanching and refill.  The pedicle was then wrapped with xeroform gauze and dressed appropriately with a telfa and gauze bandage to ensure continued blood supply and protect the attached pedicle.
Complex Repair And Xenograft Text: The defect edges were debeveled with a #15 scalpel blade.  The primary defect was closed partially with a complex linear closure.  Given the location of the defect, shape of the defect and the proximity to free margins a xenograft was deemed most appropriate to repair the remaining defect.  The graft was trimmed to fit the size of the remaining defect.  The graft was then placed in the primary defect, oriented appropriately, and sutured into place.
Anesthesia Type: 1% lidocaine with epinephrine and a 1:10 solution of 8.4% sodium bicarbonate
Skin Substitute Units (Will Override Primary Defect Units If Greater Than 0): 0
V-Y Flap Text: The defect edges were debeveled with a #15 scalpel blade.  Given the location of the defect, shape of the defect and the proximity to free margins a V-Y flap was deemed most appropriate.  Using a sterile surgical marker, an appropriate advancement flap was drawn incorporating the defect and placing the expected incisions within the relaxed skin tension lines where possible.    The area thus outlined was incised deep to adipose tissue with a #15 scalpel blade.  The skin margins were undermined to an appropriate distance in all directions utilizing iris scissors.
Show Referring Physician Variable: Yes
A-T Advancement Flap Text: The defect edges were debeveled with a #15 scalpel blade.  Given the location of the defect, shape of the defect and the proximity to free margins an A-T advancement flap was deemed most appropriate.  Using a sterile surgical marker, an appropriate advancement flap was drawn incorporating the defect and placing the expected incisions within the relaxed skin tension lines where possible.    The area thus outlined was incised deep to adipose tissue with a #15 scalpel blade.  The skin margins were undermined to an appropriate distance in all directions utilizing iris scissors.
Billing Type: Client Bill
O-Z Plasty Text: The defect edges were debeveled with a #15 scalpel blade.  Given the location of the defect, shape of the defect and the proximity to free margins an O-Z plasty (double transposition flap) was deemed most appropriate.  Using a sterile surgical marker, the appropriate transposition flaps were drawn incorporating the defect and placing the expected incisions within the relaxed skin tension lines where possible.    The area thus outlined was incised deep to adipose tissue with a #15 scalpel blade.  The skin margins were undermined to an appropriate distance in all directions utilizing iris scissors.  Hemostasis was achieved with electrocautery.  The flaps were then transposed into place, one clockwise and the other counterclockwise, and anchored with interrupted buried subcutaneous sutures.
Suturegard Intro: Intraoperative tissue expansion was performed, utilizing the SUTUREGARD device, in order to reduce wound tension.
Island Pedicle Flap Text: The defect edges were debeveled with a #15 scalpel blade.  Given the location of the defect, shape of the defect and the proximity to free margins an island pedicle advancement flap was deemed most appropriate.  Using a sterile surgical marker, an appropriate advancement flap was drawn incorporating the defect, outlining the appropriate donor tissue and placing the expected incisions within the relaxed skin tension lines where possible.    The area thus outlined was incised deep to adipose tissue with a #15 scalpel blade.  The skin margins were undermined to an appropriate distance in all directions around the primary defect and laterally outward around the island pedicle utilizing iris scissors.  There was minimal undermining beneath the pedicle flap.
Retention Suture Bite Size: 3 mm
No Repair - Repaired With Adjacent Surgical Defect Text (Leave Blank If You Do Not Want): After the excision the defect was repaired concurrently with another surgical defect which was in close approximation.
Eliptical Excision Additional Text (Leave Blank If You Do Not Want): The margin was drawn around the clinically apparent lesion.  An elliptical shape was then drawn on the skin incorporating the lesion and margins.  Incisions were then made along these lines to the appropriate tissue plane and the lesion was extirpated.
Consent was obtained from the patient. The risks and benefits to therapy were discussed in detail. Specifically, the risks of infection, scarring, bleeding, prolonged wound healing, incomplete removal, allergy to anesthesia, nerve injury and recurrence were addressed. Prior to the procedure, the treatment site was clearly identified and confirmed by the patient. All components of Universal Protocol/PAUSE Rule completed.
Estlander Flap (Lower To Upper Lip) Text: The defect of the lower lip was assessed and measured.  Given the location and size of the defect, an Estlander flap was deemed most appropriate.  Using a sterile surgical marker, an appropriate Estlander flap was measured and drawn on the upper lip. Local anesthesia was then infiltrated. A scalpel was then used to incise the lateral aspect of the flap, through skin, muscle and mucosa, leaving the flap pedicled medially.  The flap was then rotated and positioned to fill the lower lip defect.  The flap was then sutured into place with a three layer technique, closing the orbicularis oris muscle layer with subcutaneous buried sutures, followed by a mucosal layer and an epidermal layer.
Melolabial Interpolation Flap Text: A decision was made to reconstruct the defect utilizing an interpolation axial flap and a staged reconstruction.  A telfa template was made of the defect.  This telfa template was then used to outline the melolabial interpolation flap.  The donor area for the pedicle flap was then injected with anesthesia.  The flap was excised through the skin and subcutaneous tissue down to the layer of the underlying musculature.  The pedicle flap was carefully excised within this deep plane to maintain its blood supply.  The edges of the donor site were undermined.   The donor site was closed in a primary fashion.  The pedicle was then rotated into position and sutured.  Once the tube was sutured into place, adequate blood supply was confirmed with blanching and refill.  The pedicle was then wrapped with xeroform gauze and dressed appropriately with a telfa and gauze bandage to ensure continued blood supply and protect the attached pedicle.
Complex Repair And Dermal Autograft Text: The defect edges were debeveled with a #15 scalpel blade.  The primary defect was closed partially with a complex linear closure.  Given the location of the defect, shape of the defect and the proximity to free margins an dermal autograft was deemed most appropriate to repair the remaining defect.  The graft was trimmed to fit the size of the remaining defect.  The graft was then placed in the primary defect, oriented appropriately, and sutured into place.
Epidermal Autograft Text: The defect edges were debeveled with a #15 scalpel blade.  Given the location of the defect, shape of the defect and the proximity to free margins an epidermal autograft was deemed most appropriate.  Using a sterile surgical marker, the primary defect shape was transferred to the donor site. The epidermal graft was then harvested.  The skin graft was then placed in the primary defect and oriented appropriately.
Complex Repair And Single Advancement Flap Text: The defect edges were debeveled with a #15 scalpel blade.  The primary defect was closed partially with a complex linear closure.  Given the location of the remaining defect, shape of the defect and the proximity to free margins a single advancement flap was deemed most appropriate for complete closure of the defect.  Using a sterile surgical marker, an appropriate advancement flap was drawn incorporating the defect and placing the expected incisions within the relaxed skin tension lines where possible.    The area thus outlined was incised deep to adipose tissue with a #15 scalpel blade.  The skin margins were undermined to an appropriate distance in all directions utilizing iris scissors.
Peng Advancement Flap Text: The defect edges were debeveled with a #15 scalpel blade.  Given the location of the defect, shape of the defect and the proximity to free margins a Peng advancement flap was deemed most appropriate.  Using a sterile surgical marker, an appropriate advancement flap was drawn incorporating the defect and placing the expected incisions within the relaxed skin tension lines where possible. The area thus outlined was incised deep to adipose tissue with a #15 scalpel blade.  The skin margins were undermined to an appropriate distance in all directions utilizing iris scissors.
O-Z Flap Text: The defect edges were debeveled with a #15 scalpel blade.  Given the location of the defect, shape of the defect and the proximity to free margins an O-Z flap was deemed most appropriate.  Using a sterile surgical marker, an appropriate transposition flap was drawn incorporating the defect and placing the expected incisions within the relaxed skin tension lines where possible. The area thus outlined was incised deep to adipose tissue with a #15 scalpel blade.  The skin margins were undermined to an appropriate distance in all directions utilizing iris scissors.
Rhomboid Transposition Flap Text: The defect edges were debeveled with a #15 scalpel blade.  Given the location of the defect and the proximity to free margins a rhomboid transposition flap was deemed most appropriate.  Using a sterile surgical marker, an appropriate rhomboid flap was drawn incorporating the defect.    The area thus outlined was incised deep to adipose tissue with a #15 scalpel blade.  The skin margins were undermined to an appropriate distance in all directions utilizing iris scissors.
Complex Repair And W Plasty Text: The defect edges were debeveled with a #15 scalpel blade.  The primary defect was closed partially with a complex linear closure.  Given the location of the remaining defect, shape of the defect and the proximity to free margins a W plasty was deemed most appropriate for complete closure of the defect.  Using a sterile surgical marker, an appropriate advancement flap was drawn incorporating the defect and placing the expected incisions within the relaxed skin tension lines where possible.    The area thus outlined was incised deep to adipose tissue with a #15 scalpel blade.  The skin margins were undermined to an appropriate distance in all directions utilizing iris scissors.
Complex Repair And Melolabial Flap Text: The defect edges were debeveled with a #15 scalpel blade.  The primary defect was closed partially with a complex linear closure.  Given the location of the remaining defect, shape of the defect and the proximity to free margins a melolabial flap was deemed most appropriate for complete closure of the defect.  Using a sterile surgical marker, an appropriate advancement flap was drawn incorporating the defect and placing the expected incisions within the relaxed skin tension lines where possible.    The area thus outlined was incised deep to adipose tissue with a #15 scalpel blade.  The skin margins were undermined to an appropriate distance in all directions utilizing iris scissors.
Muscle Hinge Flap Text: The defect edges were debeveled with a #15 scalpel blade.  Given the size, depth and location of the defect and the proximity to free margins a muscle hinge flap was deemed most appropriate.  Using a sterile surgical marker, an appropriate hinge flap was drawn incorporating the defect. The area thus outlined was incised with a #15 scalpel blade.  The skin margins were undermined to an appropriate distance in all directions utilizing iris scissors.
Detail Level: Detailed
Length To Time In Minutes Device Was In Place: 10
Z Plasty Text: The lesion was extirpated to the level of the fat with a #15 scalpel blade.  Given the location of the defect, shape of the defect and the proximity to free margins a Z-plasty was deemed most appropriate for repair.  Using a sterile surgical marker, the appropriate transposition arms of the Z-plasty were drawn incorporating the defect and placing the expected incisions within the relaxed skin tension lines where possible.    The area thus outlined was incised deep to adipose tissue with a #15 scalpel blade.  The skin margins were undermined to an appropriate distance in all directions utilizing iris scissors.  The opposing transposition arms were then transposed into place in opposite direction and anchored with interrupted buried subcutaneous sutures.
Keystone Flap Text: The defect edges were debeveled with a #15 scalpel blade.  Given the location of the defect, shape of the defect a keystone flap was deemed most appropriate.  Using a sterile surgical marker, an appropriate keystone flap was drawn incorporating the defect, outlining the appropriate donor tissue and placing the expected incisions within the relaxed skin tension lines where possible. The area thus outlined was incised deep to adipose tissue with a #15 scalpel blade.  The skin margins were undermined to an appropriate distance in all directions around the primary defect and laterally outward around the flap utilizing iris scissors.
Intermediate / Complex Repair - Final Wound Length In Cm: 1.7
Crescentic Complex Repair Preamble Text (Leave Blank If You Do Not Want): Extensive wide undermining was performed.
Suture Removal: 14 days
Post-Care Instructions: I reviewed with the patient in detail post-care instructions. Patient is not to engage in any heavy lifting, exercise, or swimming for the next 14 days. Should the patient develop any fevers, chills, bleeding, severe pain patient will contact the office immediately.
Chonodrocutaneous Helical Advancement Flap Text: The defect edges were debeveled with a #15 scalpel blade.  Given the location of the defect and the proximity to free margins a chondrocutaneous helical advancement flap was deemed most appropriate.  Using a sterile surgical marker, the appropriate advancement flap was drawn incorporating the defect and placing the expected incisions within the relaxed skin tension lines where possible.    The area thus outlined was incised deep to adipose tissue with a #15 scalpel blade.  The skin margins were undermined to an appropriate distance in all directions utilizing iris scissors.
Trilobed Flap Text: The defect edges were debeveled with a #15 scalpel blade.  Given the location of the defect and the proximity to free margins a trilobed flap was deemed most appropriate.  Using a sterile surgical marker, an appropriate trilobed flap drawn around the defect.    The area thus outlined was incised deep to adipose tissue with a #15 scalpel blade.  The skin margins were undermined to an appropriate distance in all directions utilizing iris scissors.
Bilateral Helical Rim Advancement Flap Text: The defect edges were debeveled with a #15 blade scalpel.  Given the location of the defect and the proximity to free margins (helical rim) a bilateral helical rim advancement flap was deemed most appropriate.  Using a sterile surgical marker, the appropriate advancement flaps were drawn incorporating the defect and placing the expected incisions between the helical rim and antihelix where possible.  The area thus outlined was incised through and through with a #15 scalpel blade.  With a skin hook and iris scissors, the flaps were gently and sharply undermined and freed up.
Repair Type: Intermediate
Cheek-To-Nose Interpolation Flap Text: A decision was made to reconstruct the defect utilizing an interpolation axial flap and a staged reconstruction.  A telfa template was made of the defect.  This telfa template was then used to outline the Cheek-To-Nose Interpolation flap.  The donor area for the pedicle flap was then injected with anesthesia.  The flap was excised through the skin and subcutaneous tissue down to the layer of the underlying musculature.  The interpolation flap was carefully excised within this deep plane to maintain its blood supply.  The edges of the donor site were undermined.   The donor site was closed in a primary fashion.  The pedicle was then rotated into position and sutured.  Once the tube was sutured into place, adequate blood supply was confirmed with blanching and refill.  The pedicle was then wrapped with xeroform gauze and dressed appropriately with a telfa and gauze bandage to ensure continued blood supply and protect the attached pedicle.
Complex Repair And Split-Thickness Skin Graft Text: The defect edges were debeveled with a #15 scalpel blade.  The primary defect was closed partially with a complex linear closure.  Given the location of the defect, shape of the defect and the proximity to free margins a split thickness skin graft was deemed most appropriate to repair the remaining defect.  The graft was trimmed to fit the size of the remaining defect.  The graft was then placed in the primary defect, oriented appropriately, and sutured into place.
Melolabial Transposition Flap Text: The defect edges were debeveled with a #15 scalpel blade.  Given the location of the defect and the proximity to free margins a melolabial flap was deemed most appropriate.  Using a sterile surgical marker, an appropriate melolabial transposition flap was drawn incorporating the defect.    The area thus outlined was incised deep to adipose tissue with a #15 scalpel blade.  The skin margins were undermined to an appropriate distance in all directions utilizing iris scissors.
Complex Repair And M Plasty Text: The defect edges were debeveled with a #15 scalpel blade.  The primary defect was closed partially with a complex linear closure.  Given the location of the remaining defect, shape of the defect and the proximity to free margins an M plasty was deemed most appropriate for complete closure of the defect.  Using a sterile surgical marker, an appropriate advancement flap was drawn incorporating the defect and placing the expected incisions within the relaxed skin tension lines where possible.    The area thus outlined was incised deep to adipose tissue with a #15 scalpel blade.  The skin margins were undermined to an appropriate distance in all directions utilizing iris scissors.
Cartilage Graft Text: The defect edges were debeveled with a #15 scalpel blade.  Given the location of the defect, shape of the defect, the fact the defect involved a full thickness cartilage defect a cartilage graft was deemed most appropriate.  An appropriate donor site was identified, cleansed, and anesthetized. The cartilage graft was then harvested and transferred to the recipient site, oriented appropriately and then sutured into place.  The secondary defect was then repaired using a primary closure.
Abbe Flap (Lower To Upper Lip) Text: The defect of the upper lip was assessed and measured.  Given the location and size of the defect, an Abbe flap was deemed most appropriate.  Using a sterile surgical marker, an appropriate Abbe flap was measured and drawn on the lower lip. Local anesthesia was then infiltrated. A scalpel was then used to incise the upper lip through and through the skin, vermilion, muscle and mucosa, leaving the flap pedicled on the opposite side.  The flap was then rotated and transferred to the lower lip defect.  The flap was then sutured into place with a three layer technique, closing the orbicularis oris muscle layer with subcutaneous buried sutures, followed by a mucosal layer and an epidermal layer.
Number Of Hemigard Strips Per Side: 1
Modified Advancement Flap Text: The defect edges were debeveled with a #15 scalpel blade.  Given the location of the defect, shape of the defect and the proximity to free margins a modified advancement flap was deemed most appropriate.  Using a sterile surgical marker, an appropriate advancement flap was drawn incorporating the defect and placing the expected incisions within the relaxed skin tension lines where possible.    The area thus outlined was incised deep to adipose tissue with a #15 scalpel blade.  The skin margins were undermined to an appropriate distance in all directions utilizing iris scissors.
Complex Repair And O-L Flap Text: The defect edges were debeveled with a #15 scalpel blade.  The primary defect was closed partially with a complex linear closure.  Given the location of the remaining defect, shape of the defect and the proximity to free margins an O-L flap was deemed most appropriate for complete closure of the defect.  Using a sterile surgical marker, an appropriate flap was drawn incorporating the defect and placing the expected incisions within the relaxed skin tension lines where possible.    The area thus outlined was incised deep to adipose tissue with a #15 scalpel blade.  The skin margins were undermined to an appropriate distance in all directions utilizing iris scissors.
Star Wedge Flap Text: The defect edges were debeveled with a #15 scalpel blade.  Given the location of the defect, shape of the defect and the proximity to free margins a star wedge flap was deemed most appropriate.  Using a sterile surgical marker, an appropriate rotation flap was drawn incorporating the defect and placing the expected incisions within the relaxed skin tension lines where possible. The area thus outlined was incised deep to adipose tissue with a #15 scalpel blade.  The skin margins were undermined to an appropriate distance in all directions utilizing iris scissors.
Purse String (Intermediate) Text: Given the location of the defect and the characteristics of the surrounding skin a purse string intermediate closure was deemed most appropriate.  Undermining was performed circumferentially around the surgical defect.  A purse string suture was then placed and tightened.
Bilobed Flap Text: The defect edges were debeveled with a #15 scalpel blade.  Given the location of the defect and the proximity to free margins a bilobe flap was deemed most appropriate.  Using a sterile surgical marker, an appropriate bilobe flap drawn around the defect.    The area thus outlined was incised deep to adipose tissue with a #15 scalpel blade.  The skin margins were undermined to an appropriate distance in all directions utilizing iris scissors.
Deep Sutures: 5-0 Monocryl
H Plasty Text: Given the location of the defect, shape of the defect and the proximity to free margins a H-plasty was deemed most appropriate for repair.  Using a sterile surgical marker, the appropriate advancement arms of the H-plasty were drawn incorporating the defect and placing the expected incisions within the relaxed skin tension lines where possible. The area thus outlined was incised deep to adipose tissue with a #15 scalpel blade. The skin margins were undermined to an appropriate distance in all directions utilizing iris scissors.  The opposing advancement arms were then advanced into place in opposite direction and anchored with interrupted buried subcutaneous sutures.
Double Island Pedicle Flap Text: The defect edges were debeveled with a #15 scalpel blade.  Given the location of the defect, shape of the defect and the proximity to free margins a double island pedicle advancement flap was deemed most appropriate.  Using a sterile surgical marker, an appropriate advancement flap was drawn incorporating the defect, outlining the appropriate donor tissue and placing the expected incisions within the relaxed skin tension lines where possible.    The area thus outlined was incised deep to adipose tissue with a #15 scalpel blade.  The skin margins were undermined to an appropriate distance in all directions around the primary defect and laterally outward around the island pedicle utilizing iris scissors.  There was minimal undermining beneath the pedicle flap.
Advancement Flap (Double) Text: The defect edges were debeveled with a #15 scalpel blade.  Given the location of the defect and the proximity to free margins a double advancement flap was deemed most appropriate.  Using a sterile surgical marker, the appropriate advancement flaps were drawn incorporating the defect and placing the expected incisions within the relaxed skin tension lines where possible.    The area thus outlined was incised deep to adipose tissue with a #15 scalpel blade.  The skin margins were undermined to an appropriate distance in all directions utilizing iris scissors.
Saucerization Depth: dermis and superficial adipose tissue
Excisional Biopsy Additional Text (Leave Blank If You Do Not Want): The margin was drawn around the clinically apparent lesion. An elliptical shape was then drawn on the skin incorporating the lesion and margins.  Incisions were then made along these lines to the appropriate tissue plane and the lesion was extirpated.
Split-Thickness Skin Graft Text: The defect edges were debeveled with a #15 scalpel blade.  Given the location of the defect, shape of the defect and the proximity to free margins a split thickness skin graft was deemed most appropriate.  Using a sterile surgical marker, the primary defect shape was transferred to the donor site. The split thickness graft was then harvested.  The skin graft was then placed in the primary defect and oriented appropriately.
Paramedian Forehead Flap Text: A decision was made to reconstruct the defect utilizing an interpolation axial flap and a staged reconstruction.  A telfa template was made of the defect.  This telfa template was then used to outline the paramedian forehead pedicle flap.  The donor area for the pedicle flap was then injected with anesthesia.  The flap was excised through the skin and subcutaneous tissue down to the layer of the underlying musculature.  The pedicle flap was carefully excised within this deep plane to maintain its blood supply.  The edges of the donor site were undermined.   The donor site was closed in a primary fashion.  The pedicle was then rotated into position and sutured.  Once the tube was sutured into place, adequate blood supply was confirmed with blanching and refill.  The pedicle was then wrapped with xeroform gauze and dressed appropriately with a telfa and gauze bandage to ensure continued blood supply and protect the attached pedicle.
Complex Repair And A-T Advancement Flap Text: The defect edges were debeveled with a #15 scalpel blade.  The primary defect was closed partially with a complex linear closure.  Given the location of the remaining defect, shape of the defect and the proximity to free margins an A-T advancement flap was deemed most appropriate for complete closure of the defect.  Using a sterile surgical marker, an appropriate advancement flap was drawn incorporating the defect and placing the expected incisions within the relaxed skin tension lines where possible.    The area thus outlined was incised deep to adipose tissue with a #15 scalpel blade.  The skin margins were undermined to an appropriate distance in all directions utilizing iris scissors.
Spiral Flap Text: The defect edges were debeveled with a #15 scalpel blade.  Given the location of the defect, shape of the defect and the proximity to free margins a spiral flap was deemed most appropriate.  Using a sterile surgical marker, an appropriate rotation flap was drawn incorporating the defect and placing the expected incisions within the relaxed skin tension lines where possible. The area thus outlined was incised deep to adipose tissue with a #15 scalpel blade.  The skin margins were undermined to an appropriate distance in all directions utilizing iris scissors.
Tissue Cultured Epidermal Autograft Text: The defect edges were debeveled with a #15 scalpel blade.  Given the location of the defect, shape of the defect and the proximity to free margins a tissue cultured epidermal autograft was deemed most appropriate.  The graft was then trimmed to fit the size of the defect.  The graft was then placed in the primary defect and oriented appropriately.
Advancement-Rotation Flap Text: The defect edges were debeveled with a #15 scalpel blade.  Given the location of the defect, shape of the defect and the proximity to free margins an advancement-rotation flap was deemed most appropriate.  Using a sterile surgical marker, an appropriate flap was drawn incorporating the defect and placing the expected incisions within the relaxed skin tension lines where possible. The area thus outlined was incised deep to adipose tissue with a #15 scalpel blade.  The skin margins were undermined to an appropriate distance in all directions utilizing iris scissors.
Retention Suture Text: Retention sutures were placed to support the closure and prevent dehiscence.
Complex Repair And Ftsg Text: The defect edges were debeveled with a #15 scalpel blade.  The primary defect was closed partially with a complex linear closure.  Given the location of the defect, shape of the defect and the proximity to free margins a full thickness skin graft was deemed most appropriate to repair the remaining defect.  The graft was trimmed to fit the size of the remaining defect.  The graft was then placed in the primary defect, oriented appropriately, and sutured into place.
Complex Repair And Skin Substitute Graft Text: The defect edges were debeveled with a #15 scalpel blade.  The primary defect was closed partially with a complex linear closure.  Given the location of the remaining defect, shape of the defect and the proximity to free margins a skin substitute graft was deemed most appropriate to repair the remaining defect.  The graft was trimmed to fit the size of the remaining defect.  The graft was then placed in the primary defect, oriented appropriately, and sutured into place.
Nasalis-Muscle-Based Myocutaneous Island Pedicle Flap Text: Using a #15 blade, an incision was made around the donor flap to the level of the nasalis muscle. Wide lateral undermining was then performed in both the subcutaneous plane above the nasalis muscle, and in a submuscular plane just above periosteum. This allowed the formation of a free nasalis muscle axial pedicle (based on the angular artery) which was still attached to the actual cutaneous flap, increasing its mobility and vascular viability. Hemostasis was obtained with pinpoint electrocoagulation. The flap was mobilized into position and the pivotal anchor points positioned and stabilized with buried interrupted sutures. Subcutaneous and dermal tissues were closed in a multilayered fashion with sutures. Tissue redundancies were excised, and the epidermal edges were apposed without significant tension and sutured with sutures.
Complex Repair And Transposition Flap Text: The defect edges were debeveled with a #15 scalpel blade.  The primary defect was closed partially with a complex linear closure.  Given the location of the remaining defect, shape of the defect and the proximity to free margins a transposition flap was deemed most appropriate for complete closure of the defect.  Using a sterile surgical marker, an appropriate advancement flap was drawn incorporating the defect and placing the expected incisions within the relaxed skin tension lines where possible.    The area thus outlined was incised deep to adipose tissue with a #15 scalpel blade.  The skin margins were undermined to an appropriate distance in all directions utilizing iris scissors.
Body Location Override (Optional - Billing Will Still Be Based On Selected Body Map Location If Applicable): Right Dorsal Forearm
Excision Method: Perilesional
Size Of Lesion In Cm: 2.2
Scalpel Size: 15 blade
Double O-Z Plasty Text: The defect edges were debeveled with a #15 scalpel blade.  Given the location of the defect, shape of the defect and the proximity to free margins a Double O-Z plasty (double transposition flap) was deemed most appropriate.  Using a sterile surgical marker, the appropriate transposition flaps were drawn incorporating the defect and placing the expected incisions within the relaxed skin tension lines where possible. The area thus outlined was incised deep to adipose tissue with a #15 scalpel blade.  The skin margins were undermined to an appropriate distance in all directions utilizing iris scissors.  Hemostasis was achieved with electrocautery.  The flaps were then transposed into place, one clockwise and the other counterclockwise, and anchored with interrupted buried subcutaneous sutures.
Hemostasis: Electrodesiccation
O-T Advancement Flap Text: The defect edges were debeveled with a #15 scalpel blade.  Given the location of the defect, shape of the defect and the proximity to free margins an O-T advancement flap was deemed most appropriate.  Using a sterile surgical marker, an appropriate advancement flap was drawn incorporating the defect and placing the expected incisions within the relaxed skin tension lines where possible.    The area thus outlined was incised deep to adipose tissue with a #15 scalpel blade.  The skin margins were undermined to an appropriate distance in all directions utilizing iris scissors.
Adjacent Tissue Transfer Text: The defect edges were debeveled with a #15 scalpel blade.  Given the location of the defect and the proximity to free margins an adjacent tissue transfer was deemed most appropriate.  Using a sterile surgical marker, an appropriate flap was drawn incorporating the defect and placing the expected incisions within the relaxed skin tension lines where possible.    The area thus outlined was incised deep to adipose tissue with a #15 scalpel blade.  The skin margins were undermined to an appropriate distance in all directions utilizing iris scissors.
Island Pedicle Flap With Canthal Suspension Text: The defect edges were debeveled with a #15 scalpel blade.  Given the location of the defect, shape of the defect and the proximity to free margins an island pedicle advancement flap was deemed most appropriate.  Using a sterile surgical marker, an appropriate advancement flap was drawn incorporating the defect, outlining the appropriate donor tissue and placing the expected incisions within the relaxed skin tension lines where possible. The area thus outlined was incised deep to adipose tissue with a #15 scalpel blade.  The skin margins were undermined to an appropriate distance in all directions around the primary defect and laterally outward around the island pedicle utilizing iris scissors.  There was minimal undermining beneath the pedicle flap. A suspension suture was placed in the canthal tendon to prevent tension and prevent ectropion.
Ear Star Wedge Flap Text: The defect edges were debeveled with a #15 blade scalpel.  Given the location of the defect and the proximity to free margins (helical rim) an ear star wedge flap was deemed most appropriate.  Using a sterile surgical marker, the appropriate flap was drawn incorporating the defect and placing the expected incisions between the helical rim and antihelix where possible.  The area thus outlined was incised through and through with a #15 scalpel blade.
Information: Selecting Yes will display possible errors in your note based on the variables you have selected. This validation is only offered as a suggestion for you. PLEASE NOTE THAT THE VALIDATION TEXT WILL BE REMOVED WHEN YOU FINALIZE YOUR NOTE. IF YOU WANT TO FAX A PRELIMINARY NOTE YOU WILL NEED TO TOGGLE THIS TO 'NO' IF YOU DO NOT WANT IT IN YOUR FAXED NOTE.
Saucerization Excision Additional Text (Leave Blank If You Do Not Want): The margin was drawn around the clinically apparent lesion.  Incisions were then made along these lines, in a tangential fashion, to the appropriate tissue plane and the lesion was extirpated.
Medical Necessity Clause: This procedure was medically necessary because the lesion that was treated was:
Complex Repair And Tissue Cultured Epidermal Autograft Text: The defect edges were debeveled with a #15 scalpel blade.  The primary defect was closed partially with a complex linear closure.  Given the location of the defect, shape of the defect and the proximity to free margins an tissue cultured epidermal autograft was deemed most appropriate to repair the remaining defect.  The graft was trimmed to fit the size of the remaining defect.  The graft was then placed in the primary defect, oriented appropriately, and sutured into place.
Eye Clamp Note Details: An eye clamp was used during the procedure.
Mastoid Interpolation Flap Text: A decision was made to reconstruct the defect utilizing an interpolation axial flap and a staged reconstruction.  A telfa template was made of the defect.  This telfa template was then used to outline the mastoid interpolation flap.  The donor area for the pedicle flap was then injected with anesthesia.  The flap was excised through the skin and subcutaneous tissue down to the layer of the underlying musculature.  The pedicle flap was carefully excised within this deep plane to maintain its blood supply.  The edges of the donor site were undermined.   The donor site was closed in a primary fashion.  The pedicle was then rotated into position and sutured.  Once the tube was sutured into place, adequate blood supply was confirmed with blanching and refill.  The pedicle was then wrapped with xeroform gauze and dressed appropriately with a telfa and gauze bandage to ensure continued blood supply and protect the attached pedicle.
Complex Repair And Z Plasty Text: The defect edges were debeveled with a #15 scalpel blade.  The primary defect was closed partially with a complex linear closure.  Given the location of the remaining defect, shape of the defect and the proximity to free margins a Z plasty was deemed most appropriate for complete closure of the defect.  Using a sterile surgical marker, an appropriate advancement flap was drawn incorporating the defect and placing the expected incisions within the relaxed skin tension lines where possible.    The area thus outlined was incised deep to adipose tissue with a #15 scalpel blade.  The skin margins were undermined to an appropriate distance in all directions utilizing iris scissors.
Bi-Rhombic Flap Text: The defect edges were debeveled with a #15 scalpel blade.  Given the location of the defect and the proximity to free margins a bi-rhombic flap was deemed most appropriate.  Using a sterile surgical marker, an appropriate rhombic flap was drawn incorporating the defect. The area thus outlined was incised deep to adipose tissue with a #15 scalpel blade.  The skin margins were undermined to an appropriate distance in all directions utilizing iris scissors.
Dermal Autograft Text: The defect edges were debeveled with a #15 scalpel blade.  Given the location of the defect, shape of the defect and the proximity to free margins a dermal autograft was deemed most appropriate.  Using a sterile surgical marker, the primary defect shape was transferred to the donor site. The area thus outlined was incised deep to adipose tissue with a #15 scalpel blade.  The harvested graft was then trimmed of adipose and epidermal tissue until only dermis was left.  The skin graft was then placed in the primary defect and oriented appropriately.
Lip Wedge Excision Repair Text: Given the location of the defect and the proximity to free margins a full thickness wedge repair was deemed most appropriate.  Using a sterile surgical marker, the appropriate repair was drawn incorporating the defect and placing the expected incisions perpendicular to the vermilion border.  The vermilion border was also meticulously outlined to ensure appropriate reapproximation during the repair.  The area thus outlined was incised through and through with a #15 scalpel blade.  The muscularis and dermis were reaproximated with deep sutures following hemostasis. Care was taken to realign the vermilion border before proceeding with the superficial closure.  Once the vermilion was realigned the superfical and mucosal closure was finished.
Double O-Z Flap Text: The defect edges were debeveled with a #15 scalpel blade.  Given the location of the defect, shape of the defect and the proximity to free margins a Double O-Z flap was deemed most appropriate.  Using a sterile surgical marker, an appropriate transposition flap was drawn incorporating the defect and placing the expected incisions within the relaxed skin tension lines where possible. The area thus outlined was incised deep to adipose tissue with a #15 scalpel blade.  The skin margins were undermined to an appropriate distance in all directions utilizing iris scissors.
Anesthesia Type: 1% lidocaine with 1:100,000 epinephrine and a 1:10 solution of 8.4% sodium bicarbonate
Complex Repair And Rotation Flap Text: The defect edges were debeveled with a #15 scalpel blade.  The primary defect was closed partially with a complex linear closure.  Given the location of the remaining defect, shape of the defect and the proximity to free margins a rotation flap was deemed most appropriate for complete closure of the defect.  Using a sterile surgical marker, an appropriate advancement flap was drawn incorporating the defect and placing the expected incisions within the relaxed skin tension lines where possible.    The area thus outlined was incised deep to adipose tissue with a #15 scalpel blade.  The skin margins were undermined to an appropriate distance in all directions utilizing iris scissors.
Mustarde Flap Text: The defect edges were debeveled with a #15 scalpel blade.  Given the size, depth and location of the defect and the proximity to free margins a Mustarde flap was deemed most appropriate.  Using a sterile surgical marker, an appropriate flap was drawn incorporating the defect. The area thus outlined was incised with a #15 scalpel blade.  The skin margins were undermined to an appropriate distance in all directions utilizing iris scissors.
Hatchet Flap Text: The defect edges were debeveled with a #15 scalpel blade.  Given the location of the defect, shape of the defect and the proximity to free margins a hatchet flap was deemed most appropriate.  Using a sterile surgical marker, an appropriate hatchet flap was drawn incorporating the defect and placing the expected incisions within the relaxed skin tension lines where possible.    The area thus outlined was incised deep to adipose tissue with a #15 scalpel blade.  The skin margins were undermined to an appropriate distance in all directions utilizing iris scissors.
Complex Repair And Double Advancement Flap Text: The defect edges were debeveled with a #15 scalpel blade.  The primary defect was closed partially with a complex linear closure.  Given the location of the remaining defect, shape of the defect and the proximity to free margins a double advancement flap was deemed most appropriate for complete closure of the defect.  Using a sterile surgical marker, an appropriate advancement flap was drawn incorporating the defect and placing the expected incisions within the relaxed skin tension lines where possible.    The area thus outlined was incised deep to adipose tissue with a #15 scalpel blade.  The skin margins were undermined to an appropriate distance in all directions utilizing iris scissors.
Dorsal Nasal Flap Text: The defect edges were debeveled with a #15 scalpel blade.  Given the location of the defect and the proximity to free margins a dorsal nasal flap was deemed most appropriate.  Using a sterile surgical marker, an appropriate dorsal nasal flap was drawn around the defect.    The area thus outlined was incised deep to adipose tissue with a #15 scalpel blade.  The skin margins were undermined to an appropriate distance in all directions utilizing iris scissors.
O-T Plasty Text: The defect edges were debeveled with a #15 scalpel blade.  Given the location of the defect, shape of the defect and the proximity to free margins an O-T plasty was deemed most appropriate.  Using a sterile surgical marker, an appropriate O-T plasty was drawn incorporating the defect and placing the expected incisions within the relaxed skin tension lines where possible.    The area thus outlined was incised deep to adipose tissue with a #15 scalpel blade.  The skin margins were undermined to an appropriate distance in all directions utilizing iris scissors.
Epidermal Closure Graft Donor Site (Optional): simple interrupted
Zygomaticofacial Flap Text: Given the location of the defect, shape of the defect and the proximity to free margins a zygomaticofacial flap was deemed most appropriate for repair.  Using a sterile surgical marker, the appropriate flap was drawn incorporating the defect and placing the expected incisions within the relaxed skin tension lines where possible. The area thus outlined was incised deep to adipose tissue with a #15 scalpel blade with preservation of a vascular pedicle.  The skin margins were undermined to an appropriate distance in all directions utilizing iris scissors.  The flap was then placed into the defect and anchored with interrupted buried subcutaneous sutures.
Hemigard Postcare Instructions: The HEMIGARD strips are to remain completely dry for at least 5-7 days.
Fusiform Excision Additional Text (Leave Blank If You Do Not Want): The margin was drawn around the clinically apparent lesion.  A fusiform shape was then drawn on the skin incorporating the lesion and margins.  Incisions were then made along these lines to the appropriate tissue plane and the lesion was extirpated.
Nostril Rim Text: The closure involved the nostril rim.
Crescentic Advancement Flap Text: The defect edges were debeveled with a #15 scalpel blade.  Given the location of the defect and the proximity to free margins a crescentic advancement flap was deemed most appropriate.  Using a sterile surgical marker, the appropriate advancement flap was drawn incorporating the defect and placing the expected incisions within the relaxed skin tension lines where possible.    The area thus outlined was incised deep to adipose tissue with a #15 scalpel blade.  The skin margins were undermined to an appropriate distance in all directions utilizing iris scissors.
Repair Performed By Another Provider Text (Leave Blank If You Do Not Want): After the tissue was excised the defect was repaired by another provider.
Home Suture Removal Text: Patient was provided a home suture removal kit and will remove their sutures at home.  If they have any questions or difficulties they will call the office.
Composite Graft Text: The defect edges were debeveled with a #15 scalpel blade.  Given the location of the defect, shape of the defect, the proximity to free margins and the fact the defect was full thickness a composite graft was deemed most appropriate.  The defect was outline and then transferred to the donor site.  A full thickness graft was then excised from the donor site. The graft was then placed in the primary defect, oriented appropriately and then sutured into place.  The secondary defect was then repaired using a primary closure.
Dressing: pressure dressing with telfa
Complex Repair And Bilobe Flap Text: The defect edges were debeveled with a #15 scalpel blade.  The primary defect was closed partially with a complex linear closure.  Given the location of the remaining defect, shape of the defect and the proximity to free margins a bilobe flap was deemed most appropriate for complete closure of the defect.  Using a sterile surgical marker, an appropriate advancement flap was drawn incorporating the defect and placing the expected incisions within the relaxed skin tension lines where possible.    The area thus outlined was incised deep to adipose tissue with a #15 scalpel blade.  The skin margins were undermined to an appropriate distance in all directions utilizing iris scissors.
Transposition Flap Text: The defect edges were debeveled with a #15 scalpel blade.  Given the location of the defect and the proximity to free margins a transposition flap was deemed most appropriate.  Using a sterile surgical marker, an appropriate transposition flap was drawn incorporating the defect.    The area thus outlined was incised deep to adipose tissue with a #15 scalpel blade.  The skin margins were undermined to an appropriate distance in all directions utilizing iris scissors.
Complex Repair And Double M Plasty Text: The defect edges were debeveled with a #15 scalpel blade.  The primary defect was closed partially with a complex linear closure.  Given the location of the remaining defect, shape of the defect and the proximity to free margins a double M plasty was deemed most appropriate for complete closure of the defect.  Using a sterile surgical marker, an appropriate advancement flap was drawn incorporating the defect and placing the expected incisions within the relaxed skin tension lines where possible.    The area thus outlined was incised deep to adipose tissue with a #15 scalpel blade.  The skin margins were undermined to an appropriate distance in all directions utilizing iris scissors.
Mucosal Advancement Flap Text: Given the location of the defect, shape of the defect and the proximity to free margins a mucosal advancement flap was deemed most appropriate. Incisions were made with a 15 blade scalpel in the appropriate fashion along the cutaneous vermillion border and the mucosal lip. The remaining actinically damaged mucosal tissue was excised.  The mucosal advancement flap was then elevated to the gingival sulcus with care taken to preserve the neurovascular structures and advanced into the primary defect. Care was taken to ensure that precise realignment of the vermilion border was achieved.
Purse String (Simple) Text: Given the location of the defect and the characteristics of the surrounding skin a purse string simple closure was deemed most appropriate.  Undermining was performed circumferentially around the surgical defect.  A purse string suture was then placed and tightened.
Graft Donor Site Bandage (Optional-Leave Blank If You Don't Want In Note): Steri-strips and a pressure bandage were applied to the donor site.
Interpolation Flap Text: A decision was made to reconstruct the defect utilizing an interpolation axial flap and a staged reconstruction.  A telfa template was made of the defect.  This telfa template was then used to outline the interpolation flap.  The donor area for the pedicle flap was then injected with anesthesia.  The flap was excised through the skin and subcutaneous tissue down to the layer of the underlying musculature.  The interpolation flap was carefully excised within this deep plane to maintain its blood supply.  The edges of the donor site were undermined.   The donor site was closed in a primary fashion.  The pedicle was then rotated into position and sutured.  Once the tube was sutured into place, adequate blood supply was confirmed with blanching and refill.  The pedicle was then wrapped with xeroform gauze and dressed appropriately with a telfa and gauze bandage to ensure continued blood supply and protect the attached pedicle.
Rhombic Flap Text: The defect edges were debeveled with a #15 scalpel blade.  Given the location of the defect and the proximity to free margins a rhombic flap was deemed most appropriate.  Using a sterile surgical marker, an appropriate rhombic flap was drawn incorporating the defect.    The area thus outlined was incised deep to adipose tissue with a #15 scalpel blade.  The skin margins were undermined to an appropriate distance in all directions utilizing iris scissors.
Complex Repair And Epidermal Autograft Text: The defect edges were debeveled with a #15 scalpel blade.  The primary defect was closed partially with a complex linear closure.  Given the location of the defect, shape of the defect and the proximity to free margins an epidermal autograft was deemed most appropriate to repair the remaining defect.  The graft was trimmed to fit the size of the remaining defect.  The graft was then placed in the primary defect, oriented appropriately, and sutured into place.
Where Do You Want The Question To Include Opioid Counseling Located?: Case Summary Tab
Hemigard Intro: Due to skin fragility and wound tension, it was decided to use HEMIGARD adhesive retention suture devices to permit a linear closure. The skin was cleaned and dried for a 6cm distance away from the wound. Excessive hair, if present, was removed to allow for adhesion.
W Plasty Text: The lesion was extirpated to the level of the fat with a #15 scalpel blade.  Given the location of the defect, shape of the defect and the proximity to free margins a W-plasty was deemed most appropriate for repair.  Using a sterile surgical marker, the appropriate transposition arms of the W-plasty were drawn incorporating the defect and placing the expected incisions within the relaxed skin tension lines where possible.    The area thus outlined was incised deep to adipose tissue with a #15 scalpel blade.  The skin margins were undermined to an appropriate distance in all directions utilizing iris scissors.  The opposing transposition arms were then transposed into place in opposite direction and anchored with interrupted buried subcutaneous sutures.
Burow's Advancement Flap Text: The defect edges were debeveled with a #15 scalpel blade.  Given the location of the defect and the proximity to free margins a Burow's advancement flap was deemed most appropriate.  Using a sterile surgical marker, the appropriate advancement flap was drawn incorporating the defect and placing the expected incisions within the relaxed skin tension lines where possible.    The area thus outlined was incised deep to adipose tissue with a #15 scalpel blade.  The skin margins were undermined to an appropriate distance in all directions utilizing iris scissors.
Island Pedicle Flap-Requiring Vessel Identification Text: The defect edges were debeveled with a #15 scalpel blade.  Given the location of the defect, shape of the defect and the proximity to free margins an island pedicle advancement flap was deemed most appropriate.  Using a sterile surgical marker, an appropriate advancement flap was drawn, based on the axial vessel mentioned above, incorporating the defect, outlining the appropriate donor tissue and placing the expected incisions within the relaxed skin tension lines where possible.    The area thus outlined was incised deep to adipose tissue with a #15 scalpel blade.  The skin margins were undermined to an appropriate distance in all directions around the primary defect and laterally outward around the island pedicle utilizing iris scissors.  There was minimal undermining beneath the pedicle flap.
Bilobed Transposition Flap Text: The defect edges were debeveled with a #15 scalpel blade.  Given the location of the defect and the proximity to free margins a bilobed transposition flap was deemed most appropriate.  Using a sterile surgical marker, an appropriate bilobe flap drawn around the defect.    The area thus outlined was incised deep to adipose tissue with a #15 scalpel blade.  The skin margins were undermined to an appropriate distance in all directions utilizing iris scissors.
Wound Care: Petrolatum
Excision Depth: adipose tissue
Vermilion Border Text: The closure involved the vermilion border.
Anesthesia Volume In Cc: 4
Perilesional Excision Additional Text (Leave Blank If You Do Not Want): The margin was drawn around the clinically apparent lesion. Incisions were then made along these lines to the appropriate tissue plane and the lesion was extirpated.
Path Notes (To The Dermatopathologist): Please check margins.
Abbe Flap (Upper To Lower Lip) Text: The defect of the lower lip was assessed and measured.  Given the location and size of the defect, an Abbe flap was deemed most appropriate.  Using a sterile surgical marker, an appropriate Abbe flap was measured and drawn on the upper lip. Local anesthesia was then infiltrated.  A scalpel was then used to incise the upper lip through and through the skin, vermilion, muscle and mucosa, leaving the flap pedicled on the opposite side.  The flap was then rotated and transferred to the lower lip defect.  The flap was then sutured into place with a three layer technique, closing the orbicularis oris muscle layer with subcutaneous buried sutures, followed by a mucosal layer and an epidermal layer.
Complex Repair And Burow's Graft Text: The defect edges were debeveled with a #15 scalpel blade.  The primary defect was closed partially with a complex linear closure.  Given the location of the defect, shape of the defect, the proximity to free margins and the presence of a standing cone deformity a Burow's graft was deemed most appropriate to repair the remaining defect.  The graft was trimmed to fit the size of the remaining defect.  The graft was then placed in the primary defect, oriented appropriately, and sutured into place.
Cheek Interpolation Flap Text: A decision was made to reconstruct the defect utilizing an interpolation axial flap and a staged reconstruction.  A telfa template was made of the defect.  This telfa template was then used to outline the Cheek Interpolation flap.  The donor area for the pedicle flap was then injected with anesthesia.  The flap was excised through the skin and subcutaneous tissue down to the layer of the underlying musculature.  The interpolation flap was carefully excised within this deep plane to maintain its blood supply.  The edges of the donor site were undermined.   The donor site was closed in a primary fashion.  The pedicle was then rotated into position and sutured.  Once the tube was sutured into place, adequate blood supply was confirmed with blanching and refill.  The pedicle was then wrapped with xeroform gauze and dressed appropriately with a telfa and gauze bandage to ensure continued blood supply and protect the attached pedicle.
Epidermal Sutures: 5-0 Surgipro
Epidermal Closure: running
Helical Rim Advancement Flap Text: The defect edges were debeveled with a #15 blade scalpel.  Given the location of the defect and the proximity to free margins (helical rim) a double helical rim advancement flap was deemed most appropriate.  Using a sterile surgical marker, the appropriate advancement flaps were drawn incorporating the defect and placing the expected incisions between the helical rim and antihelix where possible.  The area thus outlined was incised through and through with a #15 scalpel blade.  With a skin hook and iris scissors, the flaps were gently and sharply undermined and freed up.
Burow's Graft Text: The defect edges were debeveled with a #15 scalpel blade.  Given the location of the defect, shape of the defect, the proximity to free margins and the presence of a standing cone deformity a Burow's skin graft was deemed most appropriate. The standing cone was removed and this tissue was then trimmed to the shape of the primary defect. The adipose tissue was also removed until only dermis and epidermis were left.  The skin margins of the secondary defect were undermined to an appropriate distance in all directions utilizing iris scissors.  The secondary defect was closed with interrupted buried subcutaneous sutures.  The skin edges were then re-apposed with running  sutures.  The skin graft was then placed in the primary defect and oriented appropriately.
Mercedes Flap Text: The defect edges were debeveled with a #15 scalpel blade.  Given the location of the defect, shape of the defect and the proximity to free margins a Mercedes flap was deemed most appropriate.  Using a sterile surgical marker, an appropriate advancement flap was drawn incorporating the defect and placing the expected incisions within the relaxed skin tension lines where possible. The area thus outlined was incised deep to adipose tissue with a #15 scalpel blade.  The skin margins were undermined to an appropriate distance in all directions utilizing iris scissors.
Undermining Type: Entire Wound
Xenograft Text: The defect edges were debeveled with a #15 scalpel blade.  Given the location of the defect, shape of the defect and the proximity to free margins a xenograft was deemed most appropriate.  The graft was then trimmed to fit the size of the defect.  The graft was then placed in the primary defect and oriented appropriately.
Complex Repair And V-Y Plasty Text: The defect edges were debeveled with a #15 scalpel blade.  The primary defect was closed partially with a complex linear closure.  Given the location of the remaining defect, shape of the defect and the proximity to free margins a V-Y plasty was deemed most appropriate for complete closure of the defect.  Using a sterile surgical marker, an appropriate advancement flap was drawn incorporating the defect and placing the expected incisions within the relaxed skin tension lines where possible.    The area thus outlined was incised deep to adipose tissue with a #15 scalpel blade.  The skin margins were undermined to an appropriate distance in all directions utilizing iris scissors.
Orbicularis Oris Muscle Flap Text: The defect edges were debeveled with a #15 scalpel blade.  Given that the defect affected the competency of the oral sphincter an orbicularis oris muscle flap was deemed most appropriate to restore this competency and normal muscle function.  Using a sterile surgical marker, an appropriate flap was drawn incorporating the defect. The area thus outlined was incised with a #15 scalpel blade.
Complex Repair And O-T Advancement Flap Text: The defect edges were debeveled with a #15 scalpel blade.  The primary defect was closed partially with a complex linear closure.  Given the location of the remaining defect, shape of the defect and the proximity to free margins an O-T advancement flap was deemed most appropriate for complete closure of the defect.  Using a sterile surgical marker, an appropriate advancement flap was drawn incorporating the defect and placing the expected incisions within the relaxed skin tension lines where possible.    The area thus outlined was incised deep to adipose tissue with a #15 scalpel blade.  The skin margins were undermined to an appropriate distance in all directions utilizing iris scissors.
Staged Advancement Flap Text: The defect edges were debeveled with a #15 scalpel blade.  Given the location of the defect, shape of the defect and the proximity to free margins a staged advancement flap was deemed most appropriate.  Using a sterile surgical marker, an appropriate advancement flap was drawn incorporating the defect and placing the expected incisions within the relaxed skin tension lines where possible. The area thus outlined was incised deep to adipose tissue with a #15 scalpel blade.  The skin margins were undermined to an appropriate distance in all directions utilizing iris scissors.
X Size Of Lesion In Cm (Optional): 2.1
V-Y Plasty Text: The defect edges were debeveled with a #15 scalpel blade.  Given the location of the defect, shape of the defect and the proximity to free margins an V-Y advancement flap was deemed most appropriate.  Using a sterile surgical marker, an appropriate advancement flap was drawn incorporating the defect and placing the expected incisions within the relaxed skin tension lines where possible.    The area thus outlined was incised deep to adipose tissue with a #15 scalpel blade.  The skin margins were undermined to an appropriate distance in all directions utilizing iris scissors.
Suturegard Body: The suture ends were repeatedly re-tightened and re-clamped to achieve the desired tissue expansion.
Alar Island Pedicle Flap Text: The defect edges were debeveled with a #15 scalpel blade.  Given the location of the defect, shape of the defect and the proximity to the alar rim an island pedicle advancement flap was deemed most appropriate.  Using a sterile surgical marker, an appropriate advancement flap was drawn incorporating the defect, outlining the appropriate donor tissue and placing the expected incisions within the nasal ala running parallel to the alar rim. The area thus outlined was incised with a #15 scalpel blade.  The skin margins were undermined minimally to an appropriate distance in all directions around the primary defect and laterally outward around the island pedicle utilizing iris scissors.  There was minimal undermining beneath the pedicle flap.
Positioning (Leave Blank If You Do Not Want): The patient was placed in a comfortable position exposing the surgical site.

## 2022-08-09 ENCOUNTER — APPOINTMENT (OUTPATIENT)
Dept: URBAN - METROPOLITAN AREA CLINIC 255 | Age: 72
Setting detail: DERMATOLOGY
End: 2022-08-15

## 2022-08-09 DIAGNOSIS — Z48.02 ENCOUNTER FOR REMOVAL OF SUTURES: ICD-10-CM

## 2022-08-09 PROCEDURE — OTHER COUNSELING: OTHER

## 2022-08-09 PROCEDURE — OTHER SUTURE REMOVAL (GLOBAL PERIOD): OTHER

## 2022-08-09 PROCEDURE — 99024 POSTOP FOLLOW-UP VISIT: CPT

## 2022-08-09 PROCEDURE — OTHER DIAGNOSIS COMMENT: OTHER

## 2022-08-09 PROCEDURE — OTHER MIPS QUALITY: OTHER

## 2022-08-09 ASSESSMENT — LOCATION DETAILED DESCRIPTION DERM: LOCATION DETAILED: RIGHT PROXIMAL DORSAL FOREARM

## 2022-08-09 ASSESSMENT — LOCATION ZONE DERM: LOCATION ZONE: ARM

## 2022-08-09 ASSESSMENT — LOCATION SIMPLE DESCRIPTION DERM: LOCATION SIMPLE: RIGHT FOREARM

## 2022-08-09 NOTE — PROCEDURE: DIAGNOSIS COMMENT
Detail Level: Simple
Comment: S/P Excision, Lipoma on (07/22/2022)
Render Risk Assessment In Note?: yes

## 2022-08-09 NOTE — PROCEDURE: MIPS QUALITY
Quality 110: Preventive Care And Screening: Influenza Immunization: Influenza Immunization previously received during influenza season
Quality 130: Documentation Of Current Medications In The Medical Record: Current Medications Documented
Quality 111:Pneumonia Vaccination Status For Older Adults: Pneumococcal vaccine administered on or after patient’s 60th birthday and before the end of the measurement period
Quality 226: Preventive Care And Screening: Tobacco Use: Screening And Cessation Intervention: Patient screened for tobacco use and is an ex/non-smoker
Quality 431: Preventive Care And Screening: Unhealthy Alcohol Use - Screening: Patient not identified as an unhealthy alcohol user when screened for unhealthy alcohol use using a systematic screening method
Detail Level: Detailed

## 2022-08-09 NOTE — PROCEDURE: SUTURE REMOVAL (GLOBAL PERIOD)
Add 74852 Cpt? (Important Note: In 2017 The Use Of 35858 Is Being Tracked By Cms To Determine Future Global Period Reimbursement For Global Periods): no
Detail Level: Detailed

## 2022-08-17 ENCOUNTER — APPOINTMENT (OUTPATIENT)
Dept: URBAN - METROPOLITAN AREA CLINIC 255 | Age: 72
Setting detail: DERMATOLOGY
End: 2022-08-23

## 2022-08-17 DIAGNOSIS — L82.1 OTHER SEBORRHEIC KERATOSIS: ICD-10-CM

## 2022-08-17 DIAGNOSIS — D17 BENIGN LIPOMATOUS NEOPLASM: ICD-10-CM

## 2022-08-17 DIAGNOSIS — L71.8 OTHER ROSACEA: ICD-10-CM

## 2022-08-17 DIAGNOSIS — L81.4 OTHER MELANIN HYPERPIGMENTATION: ICD-10-CM

## 2022-08-17 DIAGNOSIS — D18.0 HEMANGIOMA: ICD-10-CM

## 2022-08-17 DIAGNOSIS — D22 MELANOCYTIC NEVI: ICD-10-CM

## 2022-08-17 DIAGNOSIS — Z87.2 PERSONAL HISTORY OF DISEASES OF THE SKIN AND SUBCUTANEOUS TISSUE: ICD-10-CM

## 2022-08-17 PROBLEM — D18.01 HEMANGIOMA OF SKIN AND SUBCUTANEOUS TISSUE: Status: ACTIVE | Noted: 2022-08-17

## 2022-08-17 PROBLEM — D22.5 MELANOCYTIC NEVI OF TRUNK: Status: ACTIVE | Noted: 2022-08-17

## 2022-08-17 PROBLEM — D17.21 BENIGN LIPOMATOUS NEOPLASM OF SKIN AND SUBCUTANEOUS TISSUE OF RIGHT ARM: Status: ACTIVE | Noted: 2022-08-17

## 2022-08-17 PROCEDURE — OTHER MIPS QUALITY: OTHER

## 2022-08-17 PROCEDURE — OTHER PRESCRIPTION MEDICATION MANAGEMENT: OTHER

## 2022-08-17 PROCEDURE — OTHER DEFER: OTHER

## 2022-08-17 PROCEDURE — 99213 OFFICE O/P EST LOW 20 MIN: CPT

## 2022-08-17 PROCEDURE — OTHER COUNSELING: OTHER

## 2022-08-17 ASSESSMENT — LOCATION ZONE DERM
LOCATION ZONE: LEG
LOCATION ZONE: LIP
LOCATION ZONE: TRUNK
LOCATION ZONE: ARM

## 2022-08-17 ASSESSMENT — LOCATION SIMPLE DESCRIPTION DERM
LOCATION SIMPLE: LEFT UPPER BACK
LOCATION SIMPLE: RIGHT UPPER BACK
LOCATION SIMPLE: LEFT PRETIBIAL REGION
LOCATION SIMPLE: LEFT LIP
LOCATION SIMPLE: RIGHT FOREARM

## 2022-08-17 ASSESSMENT — LOCATION DETAILED DESCRIPTION DERM
LOCATION DETAILED: RIGHT MEDIAL UPPER BACK
LOCATION DETAILED: RIGHT PROXIMAL DORSAL FOREARM
LOCATION DETAILED: LEFT DISTAL PRETIBIAL REGION
LOCATION DETAILED: LEFT SUPERIOR MEDIAL UPPER BACK
LOCATION DETAILED: RIGHT DISTAL DORSAL FOREARM
LOCATION DETAILED: LEFT UPPER CUTANEOUS LIP
LOCATION DETAILED: RIGHT INFERIOR MEDIAL UPPER BACK

## 2022-08-17 NOTE — PROCEDURE: PRESCRIPTION MEDICATION MANAGEMENT
Detail Level: Zone
Render In Strict Bullet Format?: No
Continue Regimen: Patient instructed to continue Metronidazole twice daily on the face to prevent reoccurrence of acne lesions related to Rosacea.

## 2022-08-17 NOTE — PROCEDURE: COUNSELING
Detail Level: Detailed
Detail Level: Simple
Patient Specific Counseling (Will Not Stick From Patient To Patient): Treat when symptomatic.
Detail Level: Generalized

## 2022-08-17 NOTE — PROCEDURE: DEFER
Detail Level: Simple
Size Of Lesion In Cm (Optional): 0
Procedure To Be Performed At Next Visit: Laser: Pulse dye laser 595nm
Introduction Text (Please End With A Colon): The following procedure was deferred:

## 2022-08-17 NOTE — PROCEDURE: MIPS QUALITY
Quality 226: Preventive Care And Screening: Tobacco Use: Screening And Cessation Intervention: Patient screened for tobacco use and is an ex/non-smoker
Quality 130: Documentation Of Current Medications In The Medical Record: Current Medications Documented
Quality 431: Preventive Care And Screening: Unhealthy Alcohol Use - Screening: Patient not identified as an unhealthy alcohol user when screened for unhealthy alcohol use using a systematic screening method
Quality 110: Preventive Care And Screening: Influenza Immunization: Influenza Immunization previously received during influenza season
Detail Level: Detailed
Quality 111:Pneumonia Vaccination Status For Older Adults: Pneumococcal vaccine administered on or after patient’s 60th birthday and before the end of the measurement period

## 2022-09-13 ENCOUNTER — TELEPHONE (OUTPATIENT)
Dept: ENDOCRINOLOGY | Facility: CLINIC | Age: 72
End: 2022-09-13

## 2022-09-14 ENCOUNTER — RX ONLY (RX ONLY)
Age: 72
End: 2022-09-14

## 2022-09-14 RX ORDER — METRONIDAZOLE 7.5 MG/G
CREAM TOPICAL
Qty: 45 | Refills: 6 | Status: ERX

## 2022-09-25 NOTE — PROGRESS NOTES
"Keven Lopez is a 72 year old female with hx of migraines, hypothyroidism, osteoporosis, glaucoma. She is here for the following issues:    Migraine headaches  Sami is a 72 year old woman with hx of migraines. She typically has 2/month and can sometimes go 2-3 months between headaches. However, frequency of headaches has escalated over the past several months, which coincides with situational stress (see below). Typical migraines are left sided, and begin with an aura, \"spot\" that expands as migraines come on. She has some nausea and diarrhea with her headaches. She uses an flor to track her migraines. In August and September of 2021, she had roughly 3 migraines per month. Starting April, 2022 she has gradually had more migraines, having several migraine headaches per week. She does note that seasonal allergies tend to trigger her migraines. Reports she sleeps fairly well. Endorses tension in neck and upper back and sees a chiropractor. Has tried acupuncture in [facility] but had difficulty going twice weekly.    She uses sumatriptan as rescue medication. She will occasionally use medical marijuana as a rescue medication. Was prescribed gabapentin following a collarbone fracture and reports it reduced frequency of migraine headaches. Continues with 300mg am, 600mg hs dosing.  Also uses CBD tincture daily. She has been doing yoga 2-3 times/week.    Sami previously followed with Dr. Arellano, neurologist, for treatment of migraines. Her last clinic visit with Dr. Arellano was in April 2018. She has no follow up scheduled with neurology at this time. Reports they had differences in opinion on use of CBD, THC products.  Sami was given propranolol as preventive measure in 2018, however it was stopped a month later by her ophthalmologist, concerned it may lower her BP and worsen her glaucoma. She is on bromonidine and latanoprost eye drops for her glaucoma.    Situational stress  Sami's 68 yo sister passed away " unexpectedly in July 2022. Sami notes she had a complex relationship with her sister, and her unexpected death has been difficult. She is not currently seeing a therapist, but does feel supported by those around her. Is interested in pursuing therapy.     Osteoporosis of lumbar spine  Sami has a history of osteoporosis and has started increasing her weight lifting to attempt ot address this. The trainers she has been working with have been recommending she have 150 grams of protein daily. She is curious how much protein she should be getting in her diet. Review of chart indicates she currently has normal kidney function. Her protein requirements are 50grams/ day. I cautioned her that excess protein could impair kidney function.     Patient Active Problem List   Diagnosis     Migraine with aura and without status migrainosus, not intractable     Glaucoma     Pre-diabetes     Leg cramps     Muscle twitching     Hypothyroidism     Family history of malignant neoplasm of breast     Osteoporosis of lumbar spine       Current Outpatient Medications   Medication Sig Dispense Refill     brimonidine (ALPHAGAN) 0.2 % ophthalmic solution PLACE 1 DROP INTO BOTH EYES TWICE DAILY       cyanocobalamin (VITAMIN B-12) 1000 MCG SUBL sublingual tablet Place 1 tablet (1,000 mcg) under the tongue daily 90 tablet 3     estradiol (VAGIFEM) 10 MCG TABS vaginal tablet Place 1 tablet (10 mcg) vaginally twice a week 24 tablet 3     gabapentin (NEURONTIN) 300 MG capsule Take 300mg in am , 600mg in evening, doctor in [facility] prescribes 270 capsule 3     latanoprost (XALATAN) 0.005 % ophthalmic solution INSTILL 1 DROP INTO BOTH EYES AT BEDTIME AS DIRECTED.  4     levothyroxine (SYNTHROID/LEVOTHROID) 88 MCG tablet Take 1 tablet (88 mcg) by mouth daily 90 tablet 3     medical cannabis (Patient's own supply.  Not a prescription) Take by mouth 2 times daily (This is NOT a prescription, and does not certify that the patient has a qualifying medical  "condition for medical cannabis.  The purpose of this order is  to document that the patient reports taking medical cannabis.)       medical cannabis (Patient's own supply.  Not a prescription) by Other route See Admin Instructions (This is NOT a prescription, and does not certify that the patient has a qualifying medical condition for medical cannabis.  The purpose of this order is  to document that the patient reports taking medical cannabis.)       metroNIDAZOLE 0.75 % LOTN Externally apply  topically daily. Indications: Acne of Unknown cause Usually in Older Age Groups       polyethylene glycol-propylene glycol (SYSTANE ULTRA) 0.4-0.3 % SOLN ophthalmic solution Place 1 drop into both eyes every hour as needed.       SUMAtriptan (IMITREX) 100 MG tablet Take one at onset of headache . May repeat in 2 hr if needed , max 200mg in 24 hr 9 tablet 4       No Known Allergies     EXAM  /74   Pulse 69   Temp 97  F (36.1  C)   Ht 1.659 m (5' 5.32\")   Wt 59.4 kg (131 lb)   SpO2 100%   BMI 21.59 kg/m    Gen: Alert, somewhat tearful, sad  MS: tenderness with palpation over bilateral temples, over eyebrows, forehead.    Point tenderness over suboccipital, trapezius and rhomboid muscles bilaterally      Assessment:  (G43.109) Migraine with aura and without status migrainosus, not intractable  Comment: frequency and length of headaches worsening over the past 4-5 months  Plan: SUMAtriptan (IMITREX) 100 MG tablet, Adult         Neurology  Referral        Sumatriptan refilled.  Referred to neurology, headache clinic, for evaluation and treatment.   Has tried, failed propranolol (stopped by ophthalmologist) and reports diarrhea with use of Magnesium and riboflavin.     (G44.209) Tension headache  Comment: significant tension in trigger points of neck and upper back, likely triggering chronic quality of headaches  Plan: recommend stretches, heat, massage, myofascial work with chiroprator    (F43.9) Situational " stress  Comment: loss of sister with chronic illness this past summer, mixed emotions due to hx of tense relationship  Plan: Recommend meeting with our [facility], Luis Huang. Also discussed therapy options, including [facility] [place] and [facility] web sit.    45 minutes spend on the date of this encounter doing chart review, history and exam, documentation and further activities as noted above.     Follow up in 1-2 months.    Raven Buckley MD  Internal Medicine/Pediatrics      I, Antony Vivar, am serving as a scribe to document services personally performed by Dr. Raven Buckley, based on data collection and the provider's statements to me. Dr. Buckley has reviewed, edited, and approved the above note.

## 2022-09-26 ENCOUNTER — MYC MEDICAL ADVICE (OUTPATIENT)
Dept: FAMILY MEDICINE | Facility: CLINIC | Age: 72
End: 2022-09-26

## 2022-09-26 DIAGNOSIS — G43.109 MIGRAINE WITH AURA AND WITHOUT STATUS MIGRAINOSUS, NOT INTRACTABLE: Primary | ICD-10-CM

## 2022-09-27 ENCOUNTER — TELEPHONE (OUTPATIENT)
Dept: BEHAVIORAL HEALTH | Facility: CLINIC | Age: 72
End: 2022-09-27

## 2022-09-27 ENCOUNTER — OFFICE VISIT (OUTPATIENT)
Dept: FAMILY MEDICINE | Facility: CLINIC | Age: 72
End: 2022-09-27
Payer: COMMERCIAL

## 2022-09-27 VITALS
BODY MASS INDEX: 21.83 KG/M2 | TEMPERATURE: 97 F | SYSTOLIC BLOOD PRESSURE: 135 MMHG | OXYGEN SATURATION: 100 % | HEART RATE: 69 BPM | HEIGHT: 65 IN | DIASTOLIC BLOOD PRESSURE: 74 MMHG | WEIGHT: 131 LBS

## 2022-09-27 DIAGNOSIS — G43.109 MIGRAINE WITH AURA AND WITHOUT STATUS MIGRAINOSUS, NOT INTRACTABLE: Primary | ICD-10-CM

## 2022-09-27 DIAGNOSIS — F43.9 SITUATIONAL STRESS: ICD-10-CM

## 2022-09-27 DIAGNOSIS — G44.209 TENSION HEADACHE: ICD-10-CM

## 2022-09-27 RX ORDER — SUMATRIPTAN 100 MG/1
TABLET, FILM COATED ORAL
Qty: 9 TABLET | Refills: 4 | Status: SHIPPED | OUTPATIENT
Start: 2022-09-27 | End: 2023-10-31

## 2022-09-27 NOTE — TELEPHONE ENCOUNTER
"[facility] returned call and spoke directly with pt.      Sami reported history of migraines, and they've increased in frequency since the passing of her sister in July. Sami reported complicated relationship with sister and she's been \"pulling the family together\" since the passing, ie taking care of responsibilities/tasks, planning, etc. (Sami is the oldest of 6 children).  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 Sami reported she has a good support system (, friends, family) and will be traveling to [place] to spend time with sister at the end of October.  Sami did not schedule appt with [facility], but will in the future if needed.      "

## 2022-09-27 NOTE — LETTER
9/27/2022      RE: Keven Lopez  [address]  [address]  [place] [address]       Keven Lopez is a 72 year old female here for the following issues:    Migraine headaches  Sami is a 72 year old woman with hx of migraines. She typically has 2/month and can sometimes go 2-3 months between headaches.   Has aura. Typical headache symptoms: ***    She uses sumatriptan as rescue medication.      Was prescribed gabapentin following a collarbone fracture and reports it reduced frequency of migraine headaches. Continues with 300mg am, 600mg hs dosing.  Also uses CBD, 25mg q hs.     Sami has followed with Dr. Arellano, neurologist, for treatment of migraines. Her last clinic visit with Dr. Arellano was in April 2018.     In 2018,   Sami was started on propranolol as preventive measure in 2018, however it was stopped a month later. Her ophthalmologist was concerned it would lower her BP and exacerbate the glaucoma so the medication was stopped. ***        Patient Active Problem List   Diagnosis     Migraine with aura and without status migrainosus, not intractable     Glaucoma     Pre-diabetes     Leg cramps     Muscle twitching     Hypothyroidism     Family history of malignant neoplasm of breast     Osteoporosis of lumbar spine       Current Outpatient Medications   Medication Sig Dispense Refill     brimonidine (ALPHAGAN) 0.2 % ophthalmic solution PLACE 1 DROP INTO BOTH EYES TWICE DAILY       cyanocobalamin (VITAMIN B-12) 1000 MCG SUBL sublingual tablet Place 1 tablet (1,000 mcg) under the tongue daily 90 tablet 3     estradiol (VAGIFEM) 10 MCG TABS vaginal tablet Place 1 tablet (10 mcg) vaginally twice a week 24 tablet 3     gabapentin (NEURONTIN) 300 MG capsule Take 300mg in am , 600mg in evening, doctor in [facility] prescribes 270 capsule 3     latanoprost (XALATAN) 0.005 % ophthalmic solution INSTILL 1 DROP INTO BOTH EYES AT BEDTIME AS DIRECTED.  4     levothyroxine  "(SYNTHROID/LEVOTHROID) 88 MCG tablet Take 1 tablet (88 mcg) by mouth daily 90 tablet 3     medical cannabis (Patient's own supply.  Not a prescription) Take by mouth 2 times daily (This is NOT a prescription, and does not certify that the patient has a qualifying medical condition for medical cannabis.  The purpose of this order is  to document that the patient reports taking medical cannabis.)       medical cannabis (Patient's own supply.  Not a prescription) by Other route See Admin Instructions (This is NOT a prescription, and does not certify that the patient has a qualifying medical condition for medical cannabis.  The purpose of this order is  to document that the patient reports taking medical cannabis.)       metroNIDAZOLE 0.75 % LOTN Externally apply  topically daily. Indications: Acne of Unknown cause Usually in Older Age Groups       polyethylene glycol-propylene glycol (SYSTANE ULTRA) 0.4-0.3 % SOLN ophthalmic solution Place 1 drop into both eyes every hour as needed.       SUMAtriptan (IMITREX) 100 MG tablet Take one at onset of headache . May repeat in 2 hr if needed , max 200mg in 24 hr 9 tablet 4       No Known Allergies     EXAM  There were no vitals taken for this visit.  {[id number]}      Assessment:  (G43.109) Migraine with aura and without status migrainosus, not intractable  Comment: ***  Plan: ***      Keven Lopez is a 72 year old female here for the following issues:    Migraine headaches  Sami is a 72 year old woman with hx of migraines. She typically has 2/month and can sometimes go 2-3 months between headaches.   Has aura, starts as a \"spot\" and expands as migraines come on. She has some nausea and diarrhea with her headaches. She uses an flor to track her migraines. In August and September of 2021, she had roughly 3 migraines per olivia. Starting April, she has gradually had more migraines, having one migraine per week. She does note that seasonal allergies tend to " trigger her migraines.    She uses sumatriptan as rescue medication. She will occasionally use medical marijuana as a rescue medication. Was prescribed gabapentin following a collarbone fracture and reports it reduced frequency of migraine headaches. Continues with 300mg am, 600mg hs dosing.  Also uses CBD tincture daily. She has tried acupuncture in the past with improvement. She has been doing yoga 2-3 times/week.    Sami has followed with Dr. Arellano, neurologist, for treatment of migraines. Her last clinic visit with Dr. Arellano was in April 2018. She has no follow up scheduled with neurology at this time.    In 2018,   Sami was started on propranolol as preventive measure in 2018, however it was stopped a month later. Her ophthalmologist was concerned it would lower her BP and exacerbate the glaucoma so the medication was stopped. She is on bromonidine and latanoprost eye drops for her glaucoma.    ***  Sami's sister passed away unexpectedly in July. Sami notes she had a complex relationship with her sister, and her unexpected death has been difficult. She is not currently seeing a therapist, but does feel supported by those around her.    Osteoporosis of lumbar spine  Keven has a history of osteoporosis and has started increasing her weight lifting to attempt ot address this. The trainers she has been working with have been recommending she have 150 grams of protein daily. She is curious how much protein she should be getting in her diet.      Patient Active Problem List   Diagnosis     Migraine with aura and without status migrainosus, not intractable     Glaucoma     Pre-diabetes     Leg cramps     Muscle twitching     Hypothyroidism     Family history of malignant neoplasm of breast     Osteoporosis of lumbar spine       Current Outpatient Medications   Medication Sig Dispense Refill     brimonidine (ALPHAGAN) 0.2 % ophthalmic solution PLACE 1 DROP INTO BOTH EYES TWICE DAILY       cyanocobalamin  "(VITAMIN B-12) 1000 MCG SUBL sublingual tablet Place 1 tablet (1,000 mcg) under the tongue daily 90 tablet 3     estradiol (VAGIFEM) 10 MCG TABS vaginal tablet Place 1 tablet (10 mcg) vaginally twice a week 24 tablet 3     gabapentin (NEURONTIN) 300 MG capsule Take 300mg in am , 600mg in evening, doctor in [facility] prescribes 270 capsule 3     latanoprost (XALATAN) 0.005 % ophthalmic solution INSTILL 1 DROP INTO BOTH EYES AT BEDTIME AS DIRECTED.  4     levothyroxine (SYNTHROID/LEVOTHROID) 88 MCG tablet Take 1 tablet (88 mcg) by mouth daily 90 tablet 3     medical cannabis (Patient's own supply.  Not a prescription) Take by mouth 2 times daily (This is NOT a prescription, and does not certify that the patient has a qualifying medical condition for medical cannabis.  The purpose of this order is  to document that the patient reports taking medical cannabis.)       medical cannabis (Patient's own supply.  Not a prescription) by Other route See Admin Instructions (This is NOT a prescription, and does not certify that the patient has a qualifying medical condition for medical cannabis.  The purpose of this order is  to document that the patient reports taking medical cannabis.)       metroNIDAZOLE 0.75 % LOTN Externally apply  topically daily. Indications: Acne of Unknown cause Usually in Older Age Groups       polyethylene glycol-propylene glycol (SYSTANE ULTRA) 0.4-0.3 % SOLN ophthalmic solution Place 1 drop into both eyes every hour as needed.       SUMAtriptan (IMITREX) 100 MG tablet Take one at onset of headache . May repeat in 2 hr if needed , max 200mg in 24 hr 9 tablet 4       No Known Allergies     EXAM  /74   Pulse 69   Temp 97  F (36.1  C)   Ht 1.659 m (5' 5.32\")   Wt 59.4 kg (131 lb)   SpO2 100%   BMI 21.59 kg/m    Gen: Alert, pleasant, NAD  Bilateral temples tender to palpation   Point tenderness over the trapezius and rhomboid muscles       Assessment:  (G43.109) Migraine with aura and without status " migrainosus, not intractable  Comment: worsening over the past 4-5 months, ***  Plan: SUMAtriptan (IMITREX) 100 MG tablet, Adult         Neurology  Referral        Sumatriptan refilled. Stretches given to alleviate neck tightness. Referred to neurology for evaluation and treatment. ***    ***  Recommend meeting with our [facility], Luis Huang. Also discussed therapy options, including [facility] and [facility].    Follow up in 1-2 months.    Raven Buckley MD  Internal Medicine/Pediatrics      I, Antony Vivar, am serving as a scribe to document services personally performed by Dr. Raven Buckley, based on data collection and the provider's statements to me. Dr. Buckley has reviewed, edited, and approved the above note.         Raven Buckley MD

## 2022-09-27 NOTE — NURSING NOTE
"72 year old  Chief Complaint   Patient presents with     Headache     RECHECK     Pt also wants so discuss her sister's recent death and a medication discussion.       Blood pressure 135/74, pulse 69, temperature 97  F (36.1  C), height 1.659 m (5' 5.32\"), weight 59.4 kg (131 lb), SpO2 100 %. Body mass index is 21.59 kg/m .  Patient Active Problem List   Diagnosis     Migraine with aura and without status migrainosus, not intractable     Glaucoma     Pre-diabetes     Leg cramps     Muscle twitching     Hypothyroidism     Family history of malignant neoplasm of breast     Osteoporosis of lumbar spine       Wt Readings from Last 2 Encounters:   09/27/22 59.4 kg (131 lb)   07/11/22 57.6 kg (127 lb 1.3 oz)     BP Readings from Last 3 Encounters:   09/27/22 135/74   07/11/22 133/76   06/07/22 107/59         Current Outpatient Medications   Medication     brimonidine (ALPHAGAN) 0.2 % ophthalmic solution     cyanocobalamin (VITAMIN B-12) 1000 MCG SUBL sublingual tablet     estradiol (VAGIFEM) 10 MCG TABS vaginal tablet     gabapentin (NEURONTIN) 300 MG capsule     latanoprost (XALATAN) 0.005 % ophthalmic solution     levothyroxine (SYNTHROID/LEVOTHROID) 88 MCG tablet     medical cannabis (Patient's own supply.  Not a prescription)     medical cannabis (Patient's own supply.  Not a prescription)     metroNIDAZOLE 0.75 % LOTN     polyethylene glycol-propylene glycol (SYSTANE ULTRA) 0.4-0.3 % SOLN ophthalmic solution     SUMAtriptan (IMITREX) 100 MG tablet     No current facility-administered medications for this visit.       Social History     Tobacco Use     Smoking status: Never Smoker     Smokeless tobacco: Never Used   Substance Use Topics     Alcohol use: No     Drug use: No       Health Maintenance Due   Topic Date Due     ADVANCE CARE PLANNING  Never done       No results found for: PAP      September 27, 2022 10:52 AM  " pt is pod 9 s/p rpt cs came to ER w tang dressing  rpt cs done by house attending on 2/19/19  pt was seen 2 days ago by [facility] and was told that tang dressing can stay   and was instructed to return to office for f/u in 2weeks    pt had hx of wound dehiscence w prev prim cs in [place]    Vital Signs Last 24 Hrs  T(C): 36.3 (28 Feb 2019 15:50), Max: 36.3 (28 Feb 2019 15:50)  T(F): 97.4 (28 Feb 2019 15:50), Max: 97.4 (28 Feb 2019 15:50)  HR: 91 (28 Feb 2019 15:50) (91 - 91)  BP: 161/93 (28 Feb 2019 15:50) (161/93 - 161/93)  BP(mean): --  RR: 18 (28 Feb 2019 15:50) (18 - 18)  SpO2: 98% (28 Feb 2019 15:50) (98% - 98%)    abd tang dressing removed; sterris removed; wound intact no opening/ dehiscence noted no erythema/edema  dr mercado in [facility] evaluated the wound and reassured the pt the wound is healing well  instructed proper care: clean with soap and water and keep the area dry at all times with towel and change towel every 1hrs  lochia: minimal  ext no edema    pt and pt's  verbalized they do not want to go back to [facility]  -[facility] notified pt will be f/u in [facility]  -pt instructed to call and set up appt for 1 week for wound check   -pt and pt's  verbalized understanding

## 2022-09-27 NOTE — TELEPHONE ENCOUNTER
[facility] rec'd referral for pt from PCP, requesting [facility] contact pt to educate about services, answer pt questions, and assist with scheduling if needed.     [facility] contacted pt, no answer, left vm encouraging pt to contact [facility], via telephone or MyChart, if they have further questions, or to schedule appt if desired.      Shawn Ullrich LICSW, [facility]  Behavioral Health Clinician  [facility]

## 2022-09-29 DIAGNOSIS — G43.109 MIGRAINE WITH AURA AND WITHOUT STATUS MIGRAINOSUS, NOT INTRACTABLE: Primary | ICD-10-CM

## 2022-09-29 RX ORDER — PREDNISONE 20 MG/1
TABLET ORAL
Qty: 10 TABLET | Refills: 0 | Status: SHIPPED | OUTPATIENT
Start: 2022-09-29

## 2022-10-03 ENCOUNTER — TELEPHONE (OUTPATIENT)
Dept: FAMILY MEDICINE | Facility: CLINIC | Age: 72
End: 2022-10-03

## 2022-10-03 NOTE — TELEPHONE ENCOUNTER
Pt order placed to work with Dione Schofield, PT with [facility] per pt request. Faxed to [phone].    Wilbur Prince - REMI, RN  10/03/22 12:06 PM

## 2022-10-03 NOTE — TELEPHONE ENCOUNTER
Who is calling? patient  What do they need? Referral for PT  Is this an insurance referral? No  Does caller need a call back? No  Additional Comments: Please fax referral for PT to [facility] fax # [phone]    This is for her migraines    Rosa

## 2022-10-04 ENCOUNTER — TRANSFERRED RECORDS (OUTPATIENT)
Dept: HEALTH INFORMATION MANAGEMENT | Facility: CLINIC | Age: 72
End: 2022-10-04

## 2022-11-10 ENCOUNTER — TRANSFERRED RECORDS (OUTPATIENT)
Dept: HEALTH INFORMATION MANAGEMENT | Facility: CLINIC | Age: 72
End: 2022-11-10

## 2022-11-25 ENCOUNTER — TELEPHONE (OUTPATIENT)
Dept: FAMILY MEDICINE | Facility: CLINIC | Age: 72
End: 2022-11-25

## 2022-11-25 NOTE — TELEPHONE ENCOUNTER
Rosa RN replied to pt via [employer] message she sent regarding same issue.    Wilbur KHALIL, RN  11/25/22 1:29 PM

## 2022-11-25 NOTE — TELEPHONE ENCOUNTER
Patient called about discomfort and pain in her right thumb. Patient possible arthritis and doesn't know if they need to be referred out or can speak with an RN about a plan.

## 2022-11-30 ENCOUNTER — OFFICE VISIT (OUTPATIENT)
Dept: FAMILY MEDICINE | Facility: CLINIC | Age: 72
End: 2022-11-30
Payer: COMMERCIAL

## 2022-11-30 ENCOUNTER — ANCILLARY PROCEDURE (OUTPATIENT)
Dept: GENERAL RADIOLOGY | Facility: CLINIC | Age: 72
End: 2022-11-30
Attending: FAMILY MEDICINE
Payer: COMMERCIAL

## 2022-11-30 VITALS
SYSTOLIC BLOOD PRESSURE: 101 MMHG | HEART RATE: 64 BPM | OXYGEN SATURATION: 100 % | DIASTOLIC BLOOD PRESSURE: 72 MMHG | BODY MASS INDEX: 22.16 KG/M2 | WEIGHT: 133 LBS | TEMPERATURE: 97.9 F | HEIGHT: 65 IN | RESPIRATION RATE: 14 BRPM

## 2022-11-30 DIAGNOSIS — M79.644 BILATERAL THUMB PAIN: Primary | ICD-10-CM

## 2022-11-30 DIAGNOSIS — M79.645 BILATERAL THUMB PAIN: ICD-10-CM

## 2022-11-30 DIAGNOSIS — M79.644 BILATERAL THUMB PAIN: ICD-10-CM

## 2022-11-30 DIAGNOSIS — M79.645 BILATERAL THUMB PAIN: Primary | ICD-10-CM

## 2022-11-30 PROCEDURE — 73130 X-RAY EXAM OF HAND: CPT | Mod: RT | Performed by: RADIOLOGY

## 2022-11-30 NOTE — PATIENT INSTRUCTIONS
ASSESSMENT/PLAN:    Bilateral hand pain. RIGHT > LEFT. 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 Also, may have a nodule over the A1 pulley of the RIGHT hand   - Obtain X-rays. Call [phone] or [phone] to schedule imaging tests at the [facility]'s center.     - Refer to hand therapy.  - Can try Voltaren gel  - If no better, next step may be referral to Ortho hand surgical specialists   - Of note, leaving for [place] shortly.     --Xavier Woo MD

## 2022-11-30 NOTE — NURSING NOTE
"72 year old  Chief Complaint   Patient presents with     Pain     Right thumb, slight amount of pain in left thumb. At it's worst pain, intermittent burning sensation 5/10 pain in right thumb- 1-2/10 in left thumb. Ongoing for several months, and is worsening.       Blood pressure 101/72, pulse 64, temperature 97.9  F (36.6  C), resp. rate 14, height 1.651 m (5' 5\"), weight 60.3 kg (133 lb), SpO2 100 %. Body mass index is 22.13 kg/m .  Patient Active Problem List   Diagnosis     Migraine with aura and without status migrainosus, not intractable     Glaucoma     Pre-diabetes     Leg cramps     Muscle twitching     Hypothyroidism     Family history of malignant neoplasm of breast     Osteoporosis of lumbar spine       Wt Readings from Last 2 Encounters:   11/30/22 60.3 kg (133 lb)   09/27/22 59.4 kg (131 lb)     BP Readings from Last 3 Encounters:   11/30/22 101/72   09/27/22 135/74   07/11/22 133/76         Current Outpatient Medications   Medication     brimonidine (ALPHAGAN) 0.2 % ophthalmic solution     cyanocobalamin (VITAMIN B-12) 1000 MCG SUBL sublingual tablet     estradiol (VAGIFEM) 10 MCG TABS vaginal tablet     gabapentin (NEURONTIN) 300 MG capsule     latanoprost (XALATAN) 0.005 % ophthalmic solution     levothyroxine (SYNTHROID/LEVOTHROID) 88 MCG tablet     medical cannabis (Patient's own supply.  Not a prescription)     medical cannabis (Patient's own supply.  Not a prescription)     metroNIDAZOLE 0.75 % LOTN     polyethylene glycol-propylene glycol (SYSTANE ULTRA) 0.4-0.3 % SOLN ophthalmic solution     predniSONE (DELTASONE) 20 MG tablet     rimegepant (NURTEC) 75 MG ODT tablet     SUMAtriptan (IMITREX) 100 MG tablet     No current facility-administered medications for this visit.       Social History     Tobacco Use     Smoking status: Never     Smokeless tobacco: Never   Vaping Use     Vaping Use: Every day     Substances: THC   Substance Use Topics     Alcohol use: No     Drug use: No       Health " Maintenance Due   Topic Date Due     ADVANCE CARE PLANNING  Never done       No results found for: PAP      November 30, 2022 9:07 AM

## 2022-11-30 NOTE — PROGRESS NOTES
Medical assistant intake:  Keven Lopez is a 72 year old female who presents to [facility] today for Pain (Right thumb, slight amount of pain in left thumb. At it's worst pain, intermittent burning sensation 5/10 pain in right thumb- 1-2/10 in left thumb. Ongoing for several months, and is worsening.)        ASSESSMENT/PLAN:    1. Bilateral hand pain. RIGHT > LEFT. 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 Also, may have a nodule over the A1 pulley of the RIGHT hand   - Obtain X-rays. Call [phone] or [phone] to schedule imaging tests at the [facility].     - Refer to hand therapy.  - Can try Voltaren gel  - If no better, next step may be referral to Ortho hand surgical specialists   - Of note, leaving for [place] shortly.     --Xavier Woo MD      SUBJECTIVE:   This is my first time meeting Sami. She's a 73 yo with pain in RIGHT thumb increasingly over the past few months. Has mild pain in LEFT thumb.   No injuries.  The thumb pains are particularly bothersome as she enjoys playing the piano.    Review Of Systems:    Has otherwise been in usual state of health.   This includes migraine headaches.    Problem list per EMR:  Patient Active Problem List   Diagnosis     Migraine with aura and without status migrainosus, not intractable     Glaucoma     Pre-diabetes     Leg cramps     Muscle twitching     Hypothyroidism     Family history of malignant neoplasm of breast     Osteoporosis of lumbar spine       Current Outpatient Medications   Medication Sig Dispense Refill     brimonidine (ALPHAGAN) 0.2 % ophthalmic solution PLACE 1 DROP INTO BOTH EYES TWICE DAILY       cyanocobalamin (VITAMIN B-12) 1000 MCG SUBL sublingual tablet Place 1 tablet (1,000 mcg) under the tongue daily 90 tablet 3     estradiol (VAGIFEM) 10 MCG TABS vaginal tablet Place 1 tablet (10 mcg) vaginally twice a week 24 tablet 3     gabapentin (NEURONTIN) 300 MG capsule Take 300mg in am , 600mg in evening, doctor  "in [place] prescribes 270 capsule 3     latanoprost (XALATAN) 0.005 % ophthalmic solution INSTILL 1 DROP INTO BOTH EYES AT BEDTIME AS DIRECTED.  4     levothyroxine (SYNTHROID/LEVOTHROID) 88 MCG tablet Take 1 tablet (88 mcg) by mouth daily 90 tablet 3     medical cannabis (Patient's own supply.  Not a prescription) Take by mouth 2 times daily (This is NOT a prescription, and does not certify that the patient has a qualifying medical condition for medical cannabis.  The purpose of this order is  to document that the patient reports taking medical cannabis.)       medical cannabis (Patient's own supply.  Not a prescription) by Other route See Admin Instructions (This is NOT a prescription, and does not certify that the patient has a qualifying medical condition for medical cannabis.  The purpose of this order is  to document that the patient reports taking medical cannabis.)       metroNIDAZOLE 0.75 % LOTN Externally apply  topically daily. Indications: Acne of Unknown cause Usually in Older Age Groups       polyethylene glycol-propylene glycol (SYSTANE ULTRA) 0.4-0.3 % SOLN ophthalmic solution Place 1 drop into both eyes every hour as needed.       predniSONE (DELTASONE) 20 MG tablet Take 2 daily x 5 days 10 tablet 0     rimegepant (NURTEC) 75 MG ODT tablet Place 75 mg under the tongue every 48 hours Not taking yet, will obtain when worked through insurance.       SUMAtriptan (IMITREX) 100 MG tablet Take one at onset of headache . May repeat in 2 hr if needed , max 200mg in 24 hr 9 tablet 4       No Known Allergies     Social:   Spends the winter times in [place].      OBJECTIVE    Vitals: /72 (BP Location: Left arm, Patient Position: Sitting, Cuff Size: Adult Regular)   Pulse 64   Temp 97.9  F (36.6  C)   Resp 14   Ht 1.651 m (5' 5\")   Wt 60.3 kg (133 lb)   SpO2 100%   BMI 22.13 kg/m    BMI= Body mass index is 22.13 kg/m .  Appears well and in no distress.  Her thumbs have very mild musk skill or atrophy " with some bony bogginess to the CMC joint.  She has pain over the CMC joints bilaterally but on the right worse than left.  There is a small nodule palpable at the A1 pulley of the right thumb but not the left.  There is no locking of the thumb.  No kristofer tenderness over the metacarpal or the phalanges of the thumb.  Full range of motion of the wrist with no pain over the scaphoid.  Skin is intact.  Normal neurovascular exam.    SEE TOP OF NOTE FOR ASSESSMENT AND PLAN    --Xavier Woo MD  [facility], Department of Family Medicine and Community Health

## 2022-12-01 ENCOUNTER — THERAPY VISIT (OUTPATIENT)
Dept: OCCUPATIONAL THERAPY | Facility: CLINIC | Age: 72
End: 2022-12-01
Payer: COMMERCIAL

## 2022-12-01 DIAGNOSIS — M79.645 BILATERAL THUMB PAIN: ICD-10-CM

## 2022-12-01 DIAGNOSIS — M79.644 BILATERAL THUMB PAIN: ICD-10-CM

## 2022-12-01 PROCEDURE — 97110 THERAPEUTIC EXERCISES: CPT | Mod: GO | Performed by: OCCUPATIONAL THERAPIST

## 2022-12-01 PROCEDURE — 97165 OT EVAL LOW COMPLEX 30 MIN: CPT | Mod: GO | Performed by: OCCUPATIONAL THERAPIST

## 2022-12-01 PROCEDURE — 97760 ORTHOTIC MGMT&TRAING 1ST ENC: CPT | Mod: GO | Performed by: OCCUPATIONAL THERAPIST

## 2022-12-01 NOTE — PROGRESS NOTES
[facility] Hand Therapy Initial Evaluation     Current Date: 12/1/2022    Diagnosis: B Thumb pain, R>L   DOI: 5/3/22      Subjective:  Patient Health History  Keven Lopez being seen for Pain in thumb joint, mostly right hand, some in left..     Problem began: 5/3/2022.   Problem occurred: It s been ongoing for many months, worsening in past few weeks   Pain is reported as 3/10 on pain scale.  General health as reported by patient is excellent.  Pertinent medical history includes: history of fractures, migraines/headaches, osteoporosis and thyroid problems.     Medical allergies: adhesives.   Surgeries include:  Orthopedic surgery and other. Other surgery history details: Repair deviated septum.    Current medications:  Anti-seizure medication, pain medication, thyroid medication and other. Other medications details: Triptans, gepants being prescribed for migraines.       Primary job tasks include:  Driving and other.   Other job/home tasks details: Cooking meals, household tasks, playing piano.              Yoga    Occupational Profile Information:  Right hand dominant  Prior functional level:  independent-shared household chores  Patient reports symptoms of pain  Special tests:  x-ray.    Previous treatment: none  Barriers include:none  Mobility: No difficulty  Transportation: drives    Functional Outcome Measure:   Upper Extremity Functional Index Score:  SCORE:   Column Totals: /80: (P) 28   (A lower score indicates greater disability.)      Objective:  Pain Level (Scale 0-10)   12/1/2022   At Rest L: 0/10  R: 2/10   With Use L: 3/10  R: 6/10     Pain Description  Date 12/1/2022   Location B thumbs, along metacarpals, CMC, MP, sometimes up R IF   Pain Quality Aching, Burning, Dull, Sharp and Shooting   Frequency intermittent     Pain is worst  daytime or nighttime, not currently waking at night   Exacerbated by  brushing teeth, chopping food, piano   Relieved by cold, heat and rest   Progression Unchanged      ROM  Thumb 12/1/2022 12/1/2022   AROM  (PROM) R L   MP 14/69 /84 IP /63 /85   RABD 30 32   PABD 36 36   Retropulsion (cm)     Kapandji Opposition Scale (0-10/10)       Thumb Observation/Appearance   - none  + mild    ++ moderate    +++ severe     12/1/2022   Shoulder deformity present over CMC R: +  L: +   Edema over the CMC joint R: -  L: -   Noted collapse of MP into hyperextension during pinch R: -  L: -      Provocative Tests  Pain Report:  - none    + mild    ++ moderate    +++ severe     12/1/2022   Finkelstein's R: -  L: -       Strength   (Measured in pounds)  Pain Report: - none  + mild    ++ moderate    +++ severe    12/1/2022 12/1/2022   Trials R L   1  2  3 43 43   Average       Lat Pinch 12/1/2022 12/1/2022   Trials R L   1  2  3 11 12   Average       3 Pt Pinch 12/1/2022 12/1/2022   Trials R L   1  2  3 10 (3-4/10) 12 (1/10)   Average       Palpation   Pain Report:  - none    + mild    ++ moderate    +++ severe    12/1/2022   CMC Joint Line R: 1/10  L: 1/10   Thenar Vado R:   L:    Web Space R: 2/10  L: 1/10   1st DC R:   L:    Radial Styloid R: -  L: -   FCR R:   L:      Assessment:  Patient presents with symptoms consistent with 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    Patient's limitations or Problem List includes:  Pain, Decreased ROM/motion, Weakness, Decreased , Decreased pinch and Tightness in musculature of the bilateral wrist and thumb which interferes with the patient's ability to perform Self Care Tasks (dressing, eating), Sleep Patterns, Recreational Activities, Household Chores and Driving  as compared to previous level of function.    Rehab Potential:  Good - Return to full activity, some limitations    Patient will benefit from skilled Occupational Therapy to increase ROM,  strength, pinch strength and stability of thumb and decrease pain to return to previous activity level and resume normal daily tasks and to  reach their rehab potential.    Barriers to Learning:  No barrier    Communication Issues:  Patient appears to be able to clearly communicate and understand verbal and written communication and follow directions correctly.    Chart Review: Chart Review and Brief history including review of medical and/or therapy records relating to the presenting problem    Identified Performance Deficits: bathing/showering, dressing, hygiene and grooming, driving and community mobility, health management and maintenance, home establishment and management, meal preparation and cleanup, shopping, sleep and leisure activities    Assessment of Occupational Performance:  3-5 Performance Deficits    Clinical Decision Making (Complexity): Low complexity    Treatment Explanation:  The following has been discussed with the patient:    RX ordered/plan of care  Anticipated outcomes  Possible risks and side effects    Plan:  Frequency:  1 X week, once daily  Duration:  for 6 weeks    Treatment Plan:    Modalities:    US and Paraffin   Therapeutic Exercise:    AROM, PROM, Isotonics, Isometrics and Stabilization  Therapeutic Activities:   Functional activities     Neuromuscular re-ed:   Stabilization  Manual Techniques:   Joint mobilization and Myofascial release  Orthotic Fabrication:    Static  Self Care:    Ergonomic Considerations    Discharge Plan:  Achieve all LTG  [place] in home treatment program.  Reach maximal therapeutic benefit.    Home Program:  [facility]  Self Myofascial Release  Thumb Stabilization Program  Hand based Thumb Spica Orthosis     Next Visit:  Progress thumb stabilization  STM  Check orthosis, fabricate another if needed

## 2022-12-06 PROBLEM — M79.645 BILATERAL THUMB PAIN: Status: ACTIVE | Noted: 2022-12-06

## 2022-12-06 PROBLEM — M79.644 BILATERAL THUMB PAIN: Status: ACTIVE | Noted: 2022-12-06

## 2022-12-06 NOTE — PROGRESS NOTES
[facility]    OUTPATIENT Occupational Therapy ORTHOPEDIC EVALUATION  PLAN OF TREATMENT FOR OUTPATIENT REHABILITATION  (COMPLETE FOR INITIAL CLAIMS ONLY)  Patient's Last Name, First Name, M.I.  YOB: 1950  Keven Felix    Provider s Name:  [facility]   Medical Record No.  [id number]   Start of Care Date:  12/01/22   Onset Date:   05/03/22   Treatment Diagnosis:  B thumb pain Medical Diagnosis:  Bilateral thumb pain       Goals:     12/01/22 0500   Goal #1   Goal #1 household chores   Previous Performance Level Independent   Current Functional Task    Current Performance Level extreme difficulty   STG Target Perfomance Shopping bag   STG Target Perform Level moderate difficulty   Due Date 12/22/22   LTG Target Task/Performance Other - on additional line   Other Household Chores mild difficulty   Due Date 01/12/23       Therapy Frequency:  1x per week  Predicted Duration of Therapy Intervention:  6 weeks    Nelly Salvador OT                 I CERTIFY THE NEED FOR THESE SERVICES FURNISHED UNDER        THIS PLAN OF TREATMENT AND WHILE UNDER MY CARE     (Physician attestation of this document indicates review and certification of the therapy plan).                     Certification Date From:  12/01/22   Certification Date To:  01/12/23    Referring Provider:  Xavier Woo    Initial Assessment        See Epic Evaluation SOC Date: 12/01/22

## 2023-05-12 ENCOUNTER — OFFICE VISIT (OUTPATIENT)
Dept: FAMILY MEDICINE | Facility: CLINIC | Age: 73
End: 2023-05-12
Payer: COMMERCIAL

## 2023-05-12 VITALS
BODY MASS INDEX: 21.8 KG/M2 | HEART RATE: 63 BPM | RESPIRATION RATE: 17 BRPM | OXYGEN SATURATION: 98 % | TEMPERATURE: 97.6 F | SYSTOLIC BLOOD PRESSURE: 106 MMHG | DIASTOLIC BLOOD PRESSURE: 68 MMHG | WEIGHT: 131 LBS

## 2023-05-12 DIAGNOSIS — H02.9 LESION OF LEFT UPPER EYELID: Primary | ICD-10-CM

## 2023-05-12 DIAGNOSIS — G43.109 MIGRAINE WITH AURA AND WITHOUT STATUS MIGRAINOSUS, NOT INTRACTABLE: ICD-10-CM

## 2023-05-12 NOTE — NURSING NOTE
73 year old  Chief Complaint   Patient presents with     eye issue     At first felt like a bug bite, started about 3 days ago -- sore spot on L eye lid. Had gone biking beforehand. This morning was slightly painful. No fevers at home. No changes to vision.       Blood pressure 106/68, pulse 63, temperature 97.6  F (36.4  C), temperature source Temporal, resp. rate 17, weight 59.4 kg (131 lb), SpO2 98 %. Body mass index is 21.8 kg/m .  Patient Active Problem List   Diagnosis     Migraine with aura and without status migrainosus, not intractable     Glaucoma     Pre-diabetes     Leg cramps     Muscle twitching     Hypothyroidism     Family history of malignant neoplasm of breast     Osteoporosis of lumbar spine     Bilateral thumb pain       Wt Readings from Last 2 Encounters:   05/12/23 59.4 kg (131 lb)   11/30/22 60.3 kg (133 lb)     BP Readings from Last 3 Encounters:   05/12/23 106/68   11/30/22 101/72   09/27/22 135/74         Current Outpatient Medications   Medication     brimonidine (ALPHAGAN) 0.2 % ophthalmic solution     cyanocobalamin (VITAMIN B-12) 1000 MCG SUBL sublingual tablet     estradiol (VAGIFEM) 10 MCG TABS vaginal tablet     gabapentin (NEURONTIN) 300 MG capsule     latanoprost (XALATAN) 0.005 % ophthalmic solution     levothyroxine (SYNTHROID/LEVOTHROID) 88 MCG tablet     medical cannabis (Patient's own supply.  Not a prescription)     medical cannabis (Patient's own supply.  Not a prescription)     metroNIDAZOLE 0.75 % LOTN     polyethylene glycol-propylene glycol (SYSTANE ULTRA) 0.4-0.3 % SOLN ophthalmic solution     rimegepant (NURTEC) 75 MG ODT tablet     SUMAtriptan (IMITREX) 100 MG tablet     predniSONE (DELTASONE) 20 MG tablet     No current facility-administered medications for this visit.       Social History     Tobacco Use     Smoking status: Never     Smokeless tobacco: Never   Vaping Use     Vaping status: Every Day     Substances: THC   Substance Use Topics     Alcohol use: No      Drug use: No       Health Maintenance Due   Topic Date Due     NICOTINE/TOBACCO CESSATION COUNSELING Q 1 YR  Never done     ADVANCE CARE PLANNING  Never done     PHQ-2 (once per calendar year)  01/01/2023     MAMMO SCREENING  03/14/2023       No results found for: PAP      May 12, 2023 1:59 PM\

## 2023-05-12 NOTE — PATIENT INSTRUCTIONS
1% hydrocortisone ointment, twice a day  Apply to upper lid to reduces redness, swelling, pain, itching    Cool pack to soothe

## 2023-05-12 NOTE — PROGRESS NOTES
"Keven Lopez is a 73 year old female here for the following issues:    Sore Spot on Left Upper Eyelid  Sami reports a 3 day history of a sore spot on her left upper eyelid. She had just gone biking before she noticed this and it felt like a bug bite at first. It would sting to the touch. It had improved yesterday, but was a little worse again today. It was slightly painful this morning. Denies fever or changes in vision. She has not treated this with anything.     Migraines  Sami followed with neurology for her history of migraines. She received an MRI which found no issues in her brain. Because she has more visual symptoms, the neurologist felt she should avoid triptan medications. She states that she did a lot of \"hoop-jumping\" in an attempt to be approved for [facility], but was still denied. She has gotten a Cefaly device which she started using in 12/2022 for 20 minutes/day and for 1 hour at onset of migraine. She reports improvement in the frequency and severity of her migraines since starting to use the Cefaly device. She has only had to use Imitrex for relief a few times. She has an appointment with neurology next week.       Patient Active Problem List   Diagnosis     Migraine with aura and without status migrainosus, not intractable     Glaucoma     Pre-diabetes     Leg cramps     Muscle twitching     Hypothyroidism     Family history of malignant neoplasm of breast     Osteoporosis of lumbar spine     Bilateral thumb pain       Current Outpatient Medications   Medication Sig Dispense Refill     brimonidine (ALPHAGAN) 0.2 % ophthalmic solution PLACE 1 DROP INTO BOTH EYES TWICE DAILY       cyanocobalamin (VITAMIN B-12) 1000 MCG SUBL sublingual tablet Place 1 tablet (1,000 mcg) under the tongue daily 90 tablet 3     estradiol (VAGIFEM) 10 MCG TABS vaginal tablet Place 1 tablet (10 mcg) vaginally twice a week 24 tablet 3     gabapentin (NEURONTIN) 300 MG capsule Take 300mg in am , 600mg in evening, " doctor in [facility] prescribes 270 capsule 3     latanoprost (XALATAN) 0.005 % ophthalmic solution INSTILL 1 DROP INTO BOTH EYES AT BEDTIME AS DIRECTED.  4     levothyroxine (SYNTHROID/LEVOTHROID) 88 MCG tablet Take 1 tablet (88 mcg) by mouth daily 90 tablet 3     medical cannabis (Patient's own supply.  Not a prescription) Take by mouth 2 times daily (This is NOT a prescription, and does not certify that the patient has a qualifying medical condition for medical cannabis.  The purpose of this order is  to document that the patient reports taking medical cannabis.)       medical cannabis (Patient's own supply.  Not a prescription) by Other route See Admin Instructions (This is NOT a prescription, and does not certify that the patient has a qualifying medical condition for medical cannabis.  The purpose of this order is  to document that the patient reports taking medical cannabis.)       metroNIDAZOLE 0.75 % LOTN Externally apply  topically daily. Indications: Acne of Unknown cause Usually in Older Age Groups       polyethylene glycol-propylene glycol (SYSTANE ULTRA) 0.4-0.3 % SOLN ophthalmic solution Place 1 drop into both eyes every hour as needed.       rimegepant (NURTEC) 75 MG ODT tablet Place 75 mg under the tongue every 48 hours Not taking yet, will obtain when worked through insurance.       SUMAtriptan (IMITREX) 100 MG tablet Take one at onset of headache . May repeat in 2 hr if needed , max 200mg in 24 hr 9 tablet 4     predniSONE (DELTASONE) 20 MG tablet Take 2 daily x 5 days (Patient not taking: Reported on 5/12/2023) 10 tablet 0       No Known Allergies     EXAM  /68 (BP Location: Right arm, Patient Position: Sitting, Cuff Size: Adult Regular)   Pulse 63   Temp 97.6  F (36.4  C) (Temporal)   Resp 17   Wt 59.4 kg (131 lb)   SpO2 98%   BMI 21.80 kg/m    Gen: Alert, pleasant, NAD  HEENT: Linear ulceration at the medial left upper eyelid (appears to be an unroofed blister). Mild swelling at the left  upper eyelid. No foreign body identified.      Assessment:  (H02.9) Lesion of left upper eyelid  (primary encounter diagnosis)  Comment: Acute onset, left eyelid pain, suspect insect bite, no evidence for cellulitis.   Plan: Apply cool pack as needed for comfort. Apply Bacitracin twice daily. Apply 1% hydrocortisone ointment twice daily. Alternate applications of Bacitracin and hydrocortisone. Contact me for expanding redness or worsening pain.      (G43.109) Migraine with aura and without status migrainosus, not intractable  Comment: History of escalating migraines/tension headaches, now seeing neurologist at [facility]. Insurance did not cover Nurtec. She is using Imitrex and Cefaly device. Magnesium triggered diarrhea.   Plan: Follow up with neurologist as planned.      Raven Buckley MD  Internal Medicine/Pediatrics      I, Lesa Morgan, am serving as a scribe to document services personally performed by Dr. Raven Buckley, based on data collection and the provider's statements to me.     I, Raven Buckley, have reviewed, edited, and approved the above note.

## 2023-05-23 ENCOUNTER — OFFICE VISIT (OUTPATIENT)
Dept: ENDOCRINOLOGY | Facility: CLINIC | Age: 73
End: 2023-05-23
Payer: COMMERCIAL

## 2023-05-23 VITALS
DIASTOLIC BLOOD PRESSURE: 72 MMHG | BODY MASS INDEX: 22.38 KG/M2 | OXYGEN SATURATION: 100 % | WEIGHT: 134.3 LBS | HEIGHT: 65 IN | HEART RATE: 61 BPM | SYSTOLIC BLOOD PRESSURE: 116 MMHG

## 2023-05-23 DIAGNOSIS — M85.89 OSTEOPENIA OF MULTIPLE SITES: Primary | ICD-10-CM

## 2023-05-23 PROCEDURE — 99213 OFFICE O/P EST LOW 20 MIN: CPT | Performed by: INTERNAL MEDICINE

## 2023-05-23 ASSESSMENT — PAIN SCALES - GENERAL: PAINLEVEL: MILD PAIN (2)

## 2023-05-23 NOTE — LETTER
5/23/2023       RE: Keven Lopez  [address]  [address]  [place] [address]     Dear Colleague,    Thank you for referring your patient, Keven Lopez, to the [facility] [place] at [facility]. Please see a copy of my visit note below.      This 73 year old woman was seen for f/u of her pre-diabetes and osteopenia.  She was first here several years ago to discuss dietary management of diabetes.  At that time, she said she changed her diet to a vegan diet because she was found to have diabetes based on a fasting sugar over 126.  She lost about 10 lbs on this diet and her diabetes resolved.  She had maintained that diet for several years and kept her A1cs in the 5 % range.  Her A1c was measured most recently in [place] this winter.  It was 5.6%.  She reports no changes in her weight.  She continues to be very active.     With respect to her osteopenia, she is getting about 1000 mg of calcium a day from her food.  She drinks 3 glasses of soy milk a day at a minimum.  She also takes vitamin D 1000 international units once a day.  The DEXA scan last year showed that she was in the osteoporotic range but she elected to defer treatment at this time.  She is doing strength training.  She has had no fractures since our last visit.      She continues on 88 mcg of levothyroxine each day.  Her primary care doctor follows this and she will see the primary care doctor soon.    Current Outpatient Medications   Medication    brimonidine (ALPHAGAN) 0.2 % ophthalmic solution    cyanocobalamin (VITAMIN B-12) 1000 MCG SUBL sublingual tablet    estradiol (VAGIFEM) 10 MCG TABS vaginal tablet    gabapentin (NEURONTIN) 300 MG capsule    latanoprost (XALATAN) 0.005 % ophthalmic solution    levothyroxine (SYNTHROID/LEVOTHROID) 88 MCG tablet    medical cannabis (Patient's own supply.  Not a prescription)    metroNIDAZOLE 0.75 %  "LOTN    polyethylene glycol-propylene glycol (SYSTANE ULTRA) 0.4-0.3 % SOLN ophthalmic solution    predniSONE (DELTASONE) 20 MG tablet    SUMAtriptan (IMITREX) 100 MG tablet    rimegepant (NURTEC) 75 MG ODT tablet     No current facility-administered medications for this visit.         /72   Pulse 61   Ht 1.651 m (5' 5\")   Wt 60.9 kg (134 lb 4.8 oz)   SpO2 100%   BMI 22.35 kg/m    On exam she is in no acute distress.  Cranial nerves are grossly intact.  She is without periorbital edema.  She has normal skin texture.    Recent Labs   Lab Test 07/13/22  0945 06/30/21  1453 06/30/21  1427 08/21/19  0853 08/21/19  0831 08/07/18  1433 07/02/18  1348 05/22/18  0000   A1C 5.5  --  5.6   < > 5.4  --   --  5.4%  5.4  5.4%  5.4  5.4%  5.4  5.4%  5.4  5.4  5.4  5.4   TSH 2.77 1.13  --    < >  --  0.42  --  0.15   T4  --   --   --   --   --  1.36  --  1.8   * 94  --    < >  --   --    < >  --    HDL 65 65  --    < >  --   --    < >  --    TRIG 109 126  --    < >  --   --    < >  --    CR  --  0.76  --   --  0.8  --   --   --     < > = values in this interval not displayed.     Assessment and plan:    This 73-year-old woman is followed by me to modify the risks associated with her prediabetes and osteopenia.  With dietary management and exercise, she has kept her A1c at target.  She will continue to use these lifestyle interventions.  She will get her A1c measured by her primary care doctor.  She also continues to get adequate calcium intake and do weightbearing exercise.  We will plan to repeat the DEXA next year.  If she has had further reduction in her bone density, we may want to start therapy.    Salena Vann MD    "

## 2023-05-23 NOTE — PROGRESS NOTES
"  This 73 year old woman was seen for f/u of her pre-diabetes and osteopenia.  She was first here several years ago to discuss dietary management of diabetes.  At that time, she said she changed her diet to a vegan diet because she was found to have diabetes based on a fasting sugar over 126.  She lost about 10 lbs on this diet and her diabetes resolved.  She had maintained that diet for several years and kept her A1cs in the 5 % range.  Her A1c was measured most recently in [place] this winter.  It was 5.6%.  She reports no changes in her weight.  She continues to be very active.     With respect to her osteopenia, she is getting about 1000 mg of calcium a day from her food.  She drinks 3 glasses of soy milk a day at a minimum.  She also takes vitamin D 1000 international units once a day.  The DEXA scan last year showed that she was in the osteoporotic range but she elected to defer treatment at this time.  She is doing strength training.  She has had no fractures since our last visit.      She continues on 88 mcg of levothyroxine each day.  Her primary care doctor follows this and she will see the primary care doctor soon.    Current Outpatient Medications   Medication     brimonidine (ALPHAGAN) 0.2 % ophthalmic solution     cyanocobalamin (VITAMIN B-12) 1000 MCG SUBL sublingual tablet     estradiol (VAGIFEM) 10 MCG TABS vaginal tablet     gabapentin (NEURONTIN) 300 MG capsule     latanoprost (XALATAN) 0.005 % ophthalmic solution     levothyroxine (SYNTHROID/LEVOTHROID) 88 MCG tablet     medical cannabis (Patient's own supply.  Not a prescription)     metroNIDAZOLE 0.75 % LOTN     polyethylene glycol-propylene glycol (SYSTANE ULTRA) 0.4-0.3 % SOLN ophthalmic solution     predniSONE (DELTASONE) 20 MG tablet     SUMAtriptan (IMITREX) 100 MG tablet     rimegepant (NURTEC) 75 MG ODT tablet     No current facility-administered medications for this visit.         /72   Pulse 61   Ht 1.651 m (5' 5\")   Wt 60.9 " kg (134 lb 4.8 oz)   SpO2 100%   BMI 22.35 kg/m    On exam she is in no acute distress.  Cranial nerves are grossly intact.  She is without periorbital edema.  She has normal skin texture.    Recent Labs   Lab Test 07/13/22  0945 06/30/21  1453 06/30/21  1427 08/21/19  0853 08/21/19  0831 08/07/18  1433 07/02/18  1348 05/22/18  0000   A1C 5.5  --  5.6   < > 5.4  --   --  5.4%  5.4  5.4%  5.4  5.4%  5.4  5.4%  5.4  5.4  5.4  5.4   TSH 2.77 1.13  --    < >  --  0.42  --  0.15   T4  --   --   --   --   --  1.36  --  1.8   * 94  --    < >  --   --    < >  --    HDL 65 65  --    < >  --   --    < >  --    TRIG 109 126  --    < >  --   --    < >  --    CR  --  0.76  --   --  0.8  --   --   --     < > = values in this interval not displayed.     Assessment and plan:    This 73-year-old woman is followed by me to modify the risks associated with her prediabetes and osteopenia.  With dietary management and exercise, she has kept her A1c at target.  She will continue to use these lifestyle interventions.  She will get her A1c measured by her primary care doctor.  She also continues to get adequate calcium intake and do weightbearing exercise.  We will plan to repeat the DEXA next year.  If she has had further reduction in her bone density, we may want to start therapy.    Salena Vann MD

## 2023-05-23 NOTE — PATIENT INSTRUCTIONS
Have your dexa done before your visit next year    Please reach out to the following centers to schedule your appointment:       Imaging (DEXA, CT, MRI, XRAY)    [facility] ([facility], [facility] [place]) [phone]   [facility] ([facility], [place]) [phone]   [facility] ([facility]) [phone]   [facility] ([facility]) [phone]       [facility]    General [phone]   [facility] [phone]   [place] [phone]   [facility]  [phone]   [facility] [phone]   [place] [phone]   [facility]) [phone]   [facility] Walk-In Only   [place] [phone]   [place] [phone]   [place] [phone]   [place] [phone]       Infusion    [facility] [phone]   [place] [phone]   [place] [phone]   [place] [phone]   [place] [phone]   [place] [phone]   [place] [phone]     For any questions, please reach out to the [facility] Endocrinology Clinic Number for assistance: [phone].

## 2023-05-26 ENCOUNTER — TRANSFERRED RECORDS (OUTPATIENT)
Dept: HEALTH INFORMATION MANAGEMENT | Facility: CLINIC | Age: 73
End: 2023-05-26
Payer: COMMERCIAL

## 2023-05-27 ENCOUNTER — HEALTH MAINTENANCE LETTER (OUTPATIENT)
Age: 73
End: 2023-05-27

## 2023-05-31 DIAGNOSIS — E03.9 HYPOTHYROIDISM, UNSPECIFIED TYPE: ICD-10-CM

## 2023-05-31 RX ORDER — LEVOTHYROXINE SODIUM 88 UG/1
88 TABLET ORAL DAILY
Qty: 90 TABLET | Refills: 0 | Status: SHIPPED | OUTPATIENT
Start: 2023-05-31 | End: 2023-06-28

## 2023-05-31 NOTE — TELEPHONE ENCOUNTER
Medication requested: levothyroxine (SYNTHROID/LEVOTHROID) 88 MCG tablet  Last office visit: 5/12/2023  [facility] appointments: 7/13/2023  Medication last refilled: 7/11/2022; 90 + 3 refills  Last qualifying labs:     Component      Latest Ref Rng 7/13/2022  9:45 AM   TSH      0.30 - 4.20 uIU/mL 2.77      Medication is being filled for 1 time refill only due to:  pt has upcoming annual visit next month.     REMI Daly, RN  05/31/23, 4:23 PM

## 2023-06-28 ENCOUNTER — MYC REFILL (OUTPATIENT)
Dept: FAMILY MEDICINE | Facility: CLINIC | Age: 73
End: 2023-06-28

## 2023-06-28 DIAGNOSIS — E03.9 HYPOTHYROIDISM, UNSPECIFIED TYPE: ICD-10-CM

## 2023-06-29 RX ORDER — LEVOTHYROXINE SODIUM 88 UG/1
88 TABLET ORAL DAILY
Qty: 90 TABLET | Refills: 0 | Status: SHIPPED | OUTPATIENT
Start: 2023-06-29 | End: 2023-07-13

## 2023-06-29 NOTE — TELEPHONE ENCOUNTER
Last visit 5/12/23, future visit 7/13/23  Medication is being filled for 1 time refill only due to:  Patient needs labs TSH - order entered.   Adelina Olea RN  [facility]

## 2023-07-03 ENCOUNTER — APPOINTMENT (OUTPATIENT)
Dept: URBAN - METROPOLITAN AREA CLINIC 255 | Age: 73
Setting detail: DERMATOLOGY
End: 2023-07-03

## 2023-07-03 VITALS — HEIGHT: 65 IN | RESPIRATION RATE: 14 BRPM | WEIGHT: 128 LBS

## 2023-07-03 DIAGNOSIS — D22 MELANOCYTIC NEVI: ICD-10-CM

## 2023-07-03 DIAGNOSIS — Z71.89 OTHER SPECIFIED COUNSELING: ICD-10-CM

## 2023-07-03 DIAGNOSIS — L82.1 OTHER SEBORRHEIC KERATOSIS: ICD-10-CM

## 2023-07-03 DIAGNOSIS — L81.4 OTHER MELANIN HYPERPIGMENTATION: ICD-10-CM

## 2023-07-03 DIAGNOSIS — D17 BENIGN LIPOMATOUS NEOPLASM: ICD-10-CM

## 2023-07-03 DIAGNOSIS — L71.8 OTHER ROSACEA: ICD-10-CM

## 2023-07-03 DIAGNOSIS — D18.0 HEMANGIOMA: ICD-10-CM

## 2023-07-03 DIAGNOSIS — L20.89 OTHER ATOPIC DERMATITIS: ICD-10-CM

## 2023-07-03 PROBLEM — E03.9 HYPOTHYROIDISM, UNSPECIFIED: Status: ACTIVE | Noted: 2023-07-03

## 2023-07-03 PROBLEM — L55.1 SUNBURN OF SECOND DEGREE: Status: ACTIVE | Noted: 2023-07-03

## 2023-07-03 PROBLEM — D18.01 HEMANGIOMA OF SKIN AND SUBCUTANEOUS TISSUE: Status: ACTIVE | Noted: 2023-07-03

## 2023-07-03 PROBLEM — L85.3 XEROSIS CUTIS: Status: ACTIVE | Noted: 2023-07-03

## 2023-07-03 PROBLEM — D48.5 NEOPLASM OF UNCERTAIN BEHAVIOR OF SKIN: Status: ACTIVE | Noted: 2023-07-03

## 2023-07-03 PROBLEM — J30.1 ALLERGIC RHINITIS DUE TO POLLEN: Status: ACTIVE | Noted: 2023-07-03

## 2023-07-03 PROBLEM — D17.1 BENIGN LIPOMATOUS NEOPLASM OF SKIN AND SUBCUTANEOUS TISSUE OF TRUNK: Status: ACTIVE | Noted: 2023-07-03

## 2023-07-03 PROBLEM — D22.5 MELANOCYTIC NEVI OF TRUNK: Status: ACTIVE | Noted: 2023-07-03

## 2023-07-03 PROCEDURE — OTHER COUNSELING: OTHER

## 2023-07-03 PROCEDURE — OTHER PRESCRIPTION: OTHER

## 2023-07-03 PROCEDURE — 99214 OFFICE O/P EST MOD 30 MIN: CPT

## 2023-07-03 PROCEDURE — OTHER ADDITIONAL NOTES: OTHER

## 2023-07-03 PROCEDURE — OTHER MIPS QUALITY: OTHER

## 2023-07-03 RX ORDER — TACROLIMUS 1 MG/G
0.1% OINTMENT TOPICAL BID
Qty: 30 | Refills: 4 | Status: ERX | COMMUNITY
Start: 2023-07-03

## 2023-07-03 RX ORDER — METRONIDAZOLE 7.5 MG/G
0.75% CREAM TOPICAL BID
Qty: 45 | Refills: 8 | Status: ERX | COMMUNITY
Start: 2023-07-03

## 2023-07-03 ASSESSMENT — LOCATION SIMPLE DESCRIPTION DERM
LOCATION SIMPLE: UPPER BACK
LOCATION SIMPLE: LEFT SUPERIOR EYELID
LOCATION SIMPLE: LOWER BACK
LOCATION SIMPLE: LEFT CHEEK

## 2023-07-03 ASSESSMENT — LOCATION DETAILED DESCRIPTION DERM
LOCATION DETAILED: LEFT INFERIOR MEDIAL MALAR CHEEK
LOCATION DETAILED: LEFT MEDIAL SUPERIOR EYELID
LOCATION DETAILED: LEFT CENTRAL MALAR CHEEK
LOCATION DETAILED: SUPERIOR LUMBAR SPINE
LOCATION DETAILED: INFERIOR THORACIC SPINE

## 2023-07-03 ASSESSMENT — LOCATION ZONE DERM
LOCATION ZONE: FACE
LOCATION ZONE: TRUNK
LOCATION ZONE: EYELID

## 2023-07-03 NOTE — HPI: FULL BODY SKIN EXAMINATION
What Type Of Note Output Would You Prefer (Optional)?: Standard Output
What Is The Reason For Today's Visit?: Full Body Skin Examination
What Is The Reason For Today's Visit? (Being Monitored For X): concerning skin lesions on an annual basis
Additional History: The patient has irritation on the left eyelid that is puffy and itchy. Their PCP gave them hydrocortisone and bacitracin that resolved the irritation. Several weeks later the irritation returned. The patient has a spot on the left upper would like examined.

## 2023-07-03 NOTE — PROCEDURE: ADDITIONAL NOTES
Detail Level: Simple
Additional Notes: Opzelura samples (3) given. Patient is encouraged to contact the clinic if this works for a prescription to be sent in.
Render Risk Assessment In Note?: no
Additional Notes: Advised patient that if any of their lipomas become an irritant and they would like them removed, they can make a follow up appointment for a 30 minute excision.

## 2023-07-06 ASSESSMENT — ENCOUNTER SYMPTOMS
NERVOUS/ANXIOUS: 0
ABDOMINAL PAIN: 0
ARTHRALGIAS: 1
JOINT SWELLING: 0
HEADACHES: 1
HEARTBURN: 0
EYE PAIN: 0
SORE THROAT: 0
PARESTHESIAS: 1
DIZZINESS: 0
DIARRHEA: 0
HEMATOCHEZIA: 0
DYSURIA: 0
FREQUENCY: 0
BREAST MASS: 0
CHILLS: 0
WEAKNESS: 0
MYALGIAS: 0
HEMATURIA: 0
COUGH: 0
SHORTNESS OF BREATH: 0
PALPITATIONS: 0
CONSTIPATION: 0
NAUSEA: 0
FEVER: 0

## 2023-07-06 ASSESSMENT — ACTIVITIES OF DAILY LIVING (ADL): CURRENT_FUNCTION: NO ASSISTANCE NEEDED

## 2023-07-13 ENCOUNTER — OFFICE VISIT (OUTPATIENT)
Dept: FAMILY MEDICINE | Facility: CLINIC | Age: 73
End: 2023-07-13
Payer: COMMERCIAL

## 2023-07-13 VITALS
HEIGHT: 65 IN | BODY MASS INDEX: 21.23 KG/M2 | HEART RATE: 72 BPM | SYSTOLIC BLOOD PRESSURE: 118 MMHG | WEIGHT: 127.4 LBS | DIASTOLIC BLOOD PRESSURE: 74 MMHG | RESPIRATION RATE: 15 BRPM | TEMPERATURE: 98.4 F | OXYGEN SATURATION: 100 %

## 2023-07-13 DIAGNOSIS — Z12.31 ENCOUNTER FOR SCREENING MAMMOGRAM FOR BREAST CANCER: ICD-10-CM

## 2023-07-13 DIAGNOSIS — N95.2 ATROPHIC VAGINITIS: ICD-10-CM

## 2023-07-13 DIAGNOSIS — Z13.220 SCREENING FOR LIPID DISORDERS: ICD-10-CM

## 2023-07-13 DIAGNOSIS — E03.9 HYPOTHYROIDISM, UNSPECIFIED TYPE: ICD-10-CM

## 2023-07-13 DIAGNOSIS — G43.109 MIGRAINE WITH AURA AND WITHOUT STATUS MIGRAINOSUS, NOT INTRACTABLE: ICD-10-CM

## 2023-07-13 DIAGNOSIS — Z00.00 ENCOUNTER FOR MEDICARE ANNUAL WELLNESS EXAM: Primary | ICD-10-CM

## 2023-07-13 DIAGNOSIS — R73.03 PREDIABETES: ICD-10-CM

## 2023-07-13 RX ORDER — CHLORAL HYDRATE 500 MG
2 CAPSULE ORAL
COMMUNITY

## 2023-07-13 RX ORDER — ESTRADIOL 10 UG/1
10 INSERT VAGINAL
Qty: 24 TABLET | Refills: 3 | Status: SHIPPED | OUTPATIENT
Start: 2023-07-13 | End: 2024-07-11

## 2023-07-13 RX ORDER — LEVOTHYROXINE SODIUM 88 UG/1
88 TABLET ORAL DAILY
Qty: 90 TABLET | Refills: 3 | Status: SHIPPED | OUTPATIENT
Start: 2023-07-13 | End: 2023-07-22

## 2023-07-13 RX ORDER — RUXOLITINIB 15 MG/G
CREAM TOPICAL
COMMUNITY
Start: 2023-07-03 | End: 2023-10-31

## 2023-07-13 RX ORDER — DIPHENOXYLATE HYDROCHLORIDE AND ATROPINE SULFATE 2.5; .025 MG/1; MG/1
1 TABLET ORAL
COMMUNITY
End: 2023-10-31

## 2023-07-13 RX ORDER — RUXOLITINIB 15 MG/G
1 CREAM TOPICAL 2 TIMES DAILY PRN
COMMUNITY
Start: 2023-07-13

## 2023-07-13 RX ORDER — SUMATRIPTAN 100 MG/1
TABLET, FILM COATED ORAL
Qty: 9 TABLET | Refills: 4 | Status: CANCELLED | OUTPATIENT
Start: 2023-07-13

## 2023-07-13 NOTE — NURSING NOTE
"73 year old  Chief Complaint   Patient presents with     Physical     Physical and discuss medication.        Blood pressure 118/74, pulse 72, temperature 98.4  F (36.9  C), temperature source Skin, resp. rate 15, height 1.651 m (5' 5\"), weight 57.8 kg (127 lb 6.4 oz), SpO2 100 %. Body mass index is 21.2 kg/m .  Patient Active Problem List   Diagnosis     Migraine with aura and without status migrainosus, not intractable     Glaucoma     Pre-diabetes     Leg cramps     Muscle twitching     Hypothyroidism     Family history of malignant neoplasm of breast     Osteoporosis of lumbar spine     Bilateral thumb pain       Wt Readings from Last 2 Encounters:   07/13/23 57.8 kg (127 lb 6.4 oz)   05/23/23 60.9 kg (134 lb 4.8 oz)     BP Readings from Last 3 Encounters:   07/13/23 118/74   05/23/23 116/72   05/12/23 106/68         Current Outpatient Medications   Medication     brimonidine (ALPHAGAN) 0.2 % ophthalmic solution     cyanocobalamin (VITAMIN B-12) 1000 MCG SUBL sublingual tablet     estradiol (VAGIFEM) 10 MCG TABS vaginal tablet     fish oil-omega-3 fatty acids 1000 MG capsule     gabapentin (NEURONTIN) 300 MG capsule     latanoprost (XALATAN) 0.005 % ophthalmic solution     levothyroxine (SYNTHROID/LEVOTHROID) 88 MCG tablet     medical cannabis (Patient's own supply.  Not a prescription)     Multiple Vitamin (MULTI-VITAMINS) TABS     polyethylene glycol-propylene glycol (SYSTANE ULTRA) 0.4-0.3 % SOLN ophthalmic solution     predniSONE (DELTASONE) 20 MG tablet     rimegepant (NURTEC) 75 MG ODT tablet     Ruxolitinib Phosphate (OPZELURA) 1.5 % CREA     SUMAtriptan (IMITREX) 100 MG tablet     metroNIDAZOLE 0.75 % LOTN     No current facility-administered medications for this visit.       Social History     Tobacco Use     Smoking status: Never     Smokeless tobacco: Never   Vaping Use     Vaping Use: Former     Substances: THC   Substance Use Topics     Alcohol use: No     Drug use: No       Health Maintenance Due "   Topic Date Due     NICOTINE/TOBACCO CESSATION COUNSELING Q 1 YR  Never done     ADVANCE CARE PLANNING  Never done     MAMMO SCREENING  03/14/2023     FALL RISK ASSESSMENT  07/11/2023     MEDICARE ANNUAL WELLNESS VISIT  07/11/2023     TSH W/FREE T4 REFLEX  07/13/2023       No results found for: PAP      July 13, 2023 1:03 PM
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
## 2023-07-13 NOTE — PATIENT INSTRUCTIONS
Patient Education   Personalized Prevention Plan  You are due for the preventive services outlined below.  Your care team is available to assist you in scheduling these services.  If you have already completed any of these items, please share that information with your care team to update in your medical record.  Health Maintenance Due   Topic Date Due     Talk to your care team about options to quit tobacco use.  Never done     Discuss Advance Care Planning  Never done     Mammogram  03/14/2023     FALL RISK ASSESSMENT  07/11/2023     Annual Wellness Visit  07/11/2023     Thyroid Function Lab  07/13/2023

## 2023-07-19 ENCOUNTER — TELEPHONE (OUTPATIENT)
Dept: FAMILY MEDICINE | Facility: CLINIC | Age: 73
End: 2023-07-19

## 2023-07-19 NOTE — TELEPHONE ENCOUNTER
"Pt called to report clear drainage from left nostril for the last couple days, saying \"when I bend over, it runs out, and yesterday it was copious amounts.\" Reports blowing nose several times per hour at that time.  The flow is better today, but she could still feel clear fluid when she bent her head down over the sink.  Currently has a migraine on left side, which is a common occurrence for her, and improving today.   She wonders if drainage is related to migraine.  Also currently has left eyelid eczema and is applying cream which makes her eye water and wonders if that could make her nose watery as well?  She has tested negative for covid, is afebrile, and has no history of allergies.  Pt has history of repaired deviated septum and does have more difficulty breathing on left side. Reports she is supposed to be using \"breathe-right strips,\" and has not been using it, but \"used it last night and now it's better.\" She wonders if the strip was helpful. But her main concern is that she googles clear fluid drainage from nostril and saw that it could be cerebrospinal fluid.     REMI Daly, RN  07/19/23, 9:39 AM        "

## 2023-07-20 NOTE — TELEPHONE ENCOUNTER
"July 19, 2023  7:22 PM    Telephone note from patient this morning:  Pt called to report clear drainage from left nostril for the last couple days, saying \"when I bend over, it runs out, and yesterday it was copious amounts.\" Reports blowing nose several times per hour at that time.  The flow is better today, but she could still feel clear fluid when she bent her head down over the sink.  Currently has a migraine on left side, which is a common occurrence for her, and improving today.   She wonders if drainage is related to migraine.  Also currently has left eyelid eczema and is applying cream which makes her eye water and wonders if that could make her nose watery as well?  She has tested negative for covid, is afebrile, and has no history of allergies.  Pt has history of repaired deviated septum and does have more difficulty breathing on left side. Reports she is supposed to be using \"breathe-right strips,\" and has not been using it, but \"used it last night and now it's better.\" She wonders if the strip was helpful. But her main concern is that she googles clear fluid drainage from nostril and saw that it could be cerebrospinal fluid.      REMI Daly, RN  07/19/23, 9:39 AM    Telephone note. 7:23 PM July 19, 2023  I called Sami regarding drainage from her left nare. She reports 2 day hx of spontaneous copious clear drainage from the left nare. No nasal or head injury. She has hx of migraines and reports headache today, no fever. Denies allergy symptoms. Reports left eye was a little watery yesterday, better today, but that amount of fluid coming from left nare seems out of proportion to eye drainage.    She googled symptoms and read about CSF leak.   Given constellation of symptoms, acute onset and minimal eye tearing today but persistent clear nasal drainage, I recommend she proceed to ER for evaluation tonight.    She indicates her spouse will drive her to [facility].    Raven Buckley MD  Internal " Medicine/Pediatrics

## 2023-07-21 ENCOUNTER — LAB (OUTPATIENT)
Dept: LAB | Facility: CLINIC | Age: 73
End: 2023-07-21
Payer: COMMERCIAL

## 2023-07-21 DIAGNOSIS — Z13.220 SCREENING FOR LIPID DISORDERS: ICD-10-CM

## 2023-07-21 DIAGNOSIS — E03.9 HYPOTHYROIDISM, UNSPECIFIED TYPE: ICD-10-CM

## 2023-07-21 DIAGNOSIS — R73.03 PREDIABETES: ICD-10-CM

## 2023-07-21 LAB
CHOLEST SERPL-MCNC: 173 MG/DL
HBA1C MFR BLD: 5.5 % (ref 0–5.6)
HDLC SERPL-MCNC: 62 MG/DL
LDLC SERPL CALC-MCNC: 95 MG/DL
NONHDLC SERPL-MCNC: 111 MG/DL
T4 FREE SERPL-MCNC: 1.44 NG/DL (ref 0.9–1.7)
TRIGL SERPL-MCNC: 81 MG/DL
TSH SERPL DL<=0.005 MIU/L-ACNC: 6.74 UIU/ML (ref 0.3–4.2)

## 2023-07-21 PROCEDURE — 84443 ASSAY THYROID STIM HORMONE: CPT | Mod: ORL | Performed by: INTERNAL MEDICINE

## 2023-07-21 PROCEDURE — 84439 ASSAY OF FREE THYROXINE: CPT | Mod: ORL | Performed by: INTERNAL MEDICINE

## 2023-07-21 PROCEDURE — 80061 LIPID PANEL: CPT | Mod: ORL | Performed by: INTERNAL MEDICINE

## 2023-07-22 RX ORDER — LEVOTHYROXINE SODIUM 100 UG/1
100 TABLET ORAL DAILY
Qty: 90 TABLET | Refills: 0 | Status: SHIPPED | OUTPATIENT
Start: 2023-07-22 | End: 2023-09-16

## 2023-07-28 ENCOUNTER — HOSPITAL ENCOUNTER (OUTPATIENT)
Dept: CT IMAGING | Facility: CLINIC | Age: 73
Discharge: HOME | End: 2023-07-28
Attending: INTERNAL MEDICINE | Admitting: INTERNAL MEDICINE
Payer: COMMERCIAL

## 2023-07-28 DIAGNOSIS — Z13.220 SCREENING FOR LIPID DISORDERS: ICD-10-CM

## 2023-07-28 DIAGNOSIS — R73.03 PREDIABETES: ICD-10-CM

## 2023-07-28 PROCEDURE — 75571 CT HRT W/O DYE W/CA TEST: CPT | Mod: 26 | Performed by: STUDENT IN AN ORGANIZED HEALTH CARE EDUCATION/TRAINING PROGRAM

## 2023-07-28 PROCEDURE — 75571 CT HRT W/O DYE W/CA TEST: CPT | Mod: GA

## 2023-08-03 ENCOUNTER — RX ONLY (RX ONLY)
Age: 73
End: 2023-08-03

## 2023-08-03 RX ORDER — HYDROCORTISONE 25 MG/G
2.5% OINTMENT TOPICAL TWICE A DAY
Qty: 28.35 | Refills: 1 | Status: ERX | COMMUNITY
Start: 2023-08-03

## 2023-08-04 ENCOUNTER — THERAPY VISIT (OUTPATIENT)
Dept: OCCUPATIONAL THERAPY | Facility: CLINIC | Age: 73
End: 2023-08-04
Payer: COMMERCIAL

## 2023-08-04 DIAGNOSIS — M79.644 BILATERAL THUMB PAIN: Primary | ICD-10-CM

## 2023-08-04 DIAGNOSIS — M79.645 BILATERAL THUMB PAIN: Primary | ICD-10-CM

## 2023-08-04 PROCEDURE — 97110 THERAPEUTIC EXERCISES: CPT | Mod: GO | Performed by: OCCUPATIONAL THERAPIST

## 2023-08-04 PROCEDURE — 97165 OT EVAL LOW COMPLEX 30 MIN: CPT | Mod: GO | Performed by: OCCUPATIONAL THERAPIST

## 2023-08-04 PROCEDURE — 97760 ORTHOTIC MGMT&TRAING 1ST ENC: CPT | Mod: GO | Performed by: OCCUPATIONAL THERAPIST

## 2023-08-04 NOTE — PROGRESS NOTES
OCCUPATIONAL THERAPY EVALUATION  Type of Visit: Evaluation    See electronic medical record for Abuse and Falls Screening details.    Subjective      Presenting condition or subjective complaint:    Date of onset: 12/01/22 (MD order date)    Relevant medical history:     Past Medical History:   Diagnosis Date    Glaucoma     recently diagnosed    Hypothyroid     Latent tuberculosis infection 7/2/2018    Treated with 9 months of INH in the 1970s    Microscopic hematuria     Work-up negative    Migraines     Prediabetes     Sleep apnea     mild, uses dental device    TMJ (dislocation of temporomandibular joint)    Dates & types of surgery:    Past Surgical History:   Procedure Laterality Date    BREAST BIOPSY, RT/LT      CLAVICLE SURGERY Left 12/21/2020    surgery in [place], pinning, fixation    COLONOSCOPY  02/27/2012    Procedure:COLONOSCOPY; COLONOSCOPY; Surgeon:JLUIS HUNTER; Location: GI    SEPTOPLASTY, TURBINOPLASTY, COMBINED  05/24/2012    Procedure:COMBINED SEPTOPLASTY, TURBINOPLASTY; Septoplasty, Submuccosal Resection Turbinates; Surgeon:AARON ATKINSON; Location:[facility]    urethral stricture dilatation       Prior diagnostic imaging/testing results:       Prior therapy history for the same diagnosis, illness or injury:        Prior Level of Function  Transfers:   Ambulation:   ADL:   IADL:     Living Environment  Social support:     Type of home:     Stairs to enter the home:         Ramp:     Stairs inside the home:         Help at home:    Equipment owned:       Employment:      Hobbies/Interests:  Taking piano lessons, yoga, household tasks    Patient goals for therapy:  Playing piano     Objective   Right hand dominant  Patient reports symptoms of pain, stiffness/loss of motion, and weakness/loss of strength    Pain Level (Scale 0-10)   8/4/2023   At Rest 0/10   With Use 5-6/10     Pain Description  Date 8/4/2023   Location thumb   Pain Quality Sharp and Shooting, Burning   Frequency  intermittent     Pain is worst  daytime   Exacerbated by  Gripping, opening jars, playing piano   Relieved by rest and OTC brace   Progression Gradually improved after therapy, then worsened     ROM  Thumb 8/4/2023 8/4/2023   AROM  (PROM) L R   MP /68 /44 +   IP /56 /68      Thumb Observation/Appearance   - none  + mild    ++ moderate    +++ severe     8/4/2023   Shoulder deformity present over CMC R: +  L: +   Edema over the CMC joint R: -  L: -   Noted collapse of MP into hyperextension during pinch R: +  L: ++      Strength   (Measured in pounds)  Pain Report: - none  + mild    ++ moderate    +++ severe    8/4/2023 8/4/2023   Trials R L   1  2  3 22 + 32 +   Average 22 + 32 +     Lat Pinch 8/4/2023 8/4/2023   Trials R L   1  2  3 9 + 11   Average 9 + 11     3 Pt Pinch 8/4/2023 8/4/2023   Trials R L   1  2  3 7 + 7    Average 7 + 7     Palpation   Pain Report:  - none    + mild    ++ moderate    +++ severe    8/4/2023   CMC Joint Line R: ++  L: -   Thenar Eminence R: +  L: -   Web Space R: +  L: -   1st DC R: -  L: -   Radial Styloid R: -  L: -   FCR R: -  L: -     Assessment & Plan   CLINICAL IMPRESSIONS  Medical Diagnosis: Bilateral thumb pain    Treatment Diagnosis: Bilateral thumb pain    Impression/Assessment: Pt is a 73 year old female presenting to Occupational Therapy due to gradual onset.  The following significant findings have been identified: Impaired ROM, Impaired strength, and Pain.  These identified deficits interfere with their ability to perform self care tasks, recreational activities, household chores, driving ,  yard work, and meal planning and preparation as compared to previous level of function.   Patient's limitations or Problem List includes: Pain, Decreased ROM/motion, Increased edema, Weakness, Decreased stability, Decreased , Decreased pinch, and Tightness in musculature of the right thumb which interferes with the patient's ability to perform Self Care Tasks (dressing, eating,  bathing, hygiene/toileting), Sleep Patterns, Recreational Activities, and Household Chores as compared to previous level of function.    Clinical Decision Making (Complexity):  Assessment of Occupational Performance: 5 or more Performance Deficits  Occupational Performance Limitations: bathing/showering, toileting, dressing, feeding, functional mobility, hygiene and grooming, health management and maintenance, home establishment and management, meal preparation and cleanup, shopping, and leisure activities  Clinical Decision Making (Complexity): Low complexity    PLAN OF CARE  Treatment Interventions:  Modalities:  US and Paraffin  Therapeutic Exercise:  AROM, AAROM, PROM, Tendon Gliding, Blocking, Reverse Blocking, Place and Hold, Contract Relax, Extensor Tracking, Isotonics, Isometrics and Stabilization  Neuromuscular re-education:  Nerve Gliding, Coordination/Dexterity, Sensory re-education, Desensitization, Kinesthetic Training, Proprioceptive Training, Kinesiotaping, Strain Counter Strain, Isometrics, Stabilization   Manual Techniques:  Coordination/Dexterity, Joint mobilization, Scar mobilization, Friction massage, Myofascial release, and Manual edema mobilization  Orthotic Fabrication:  Static and Hand based  Self Care:  Self Care Tasks and Ergonomic Considerations    Long Term Goals   OT Goal 2  Goal Identifier: Household IADLs - carrying a shopping bag  Goal Description: No difficulty carrying a shopping bag equal to or greater than 10lb  Rationale: In order to maximize safety and independence with performance of self-care activities  Goal Progress: Mild difficulty d/t pain with pinching  Target Date: 11/04/23      Frequency of Treatment: 2x/month  Duration of Treatment: 3 months     Recommended Referrals to Other Professionals:   Education Assessment: Learner/Method: Patient;Pictures/Video;Demonstration  Education Comments: PTRX printed     Risks and benefits of evaluation/treatment have been explained.    Patient/Family/caregiver agrees with Plan of Care.     Evaluation Time:    OT Eval, Low Complexity Minutes (28996): 30       Signing Clinician: KAEDEM Stone [facility]                                                                                   OUTPATIENT OCCUPATIONAL THERAPY      PLAN OF TREATMENT FOR OUTPATIENT REHABILITATION   Patient's Last Name, First Name, Keven Chou YOB: 1950   Provider's [facility]   [facility]   Medical Record No.  [id number]     Onset Date: 12/01/22 (MD order date) Start of Care Date: 08/04/23     Medical Diagnosis:  Bilateral thumb pain      OT Treatment Diagnosis:  Bilateral thumb pain Plan of Treatment  Frequency/Duration:2x/month/3 months    Certification date from 08/04/23   To 11/04/23        See note for plan of treatment details and functional goals     Romina Carmen OT                         I CERTIFY THE NEED FOR THESE SERVICES FURNISHED UNDER        THIS PLAN OF TREATMENT AND WHILE UNDER MY CARE     (Physician attestation of this document indicates review and certification of the therapy plan).                Referring Provider:  Xavier Woo      Initial Assessment  See Epic Evaluation- 08/04/23

## 2023-08-22 ENCOUNTER — NURSE TRIAGE (OUTPATIENT)
Dept: NURSING | Facility: CLINIC | Age: 73
End: 2023-08-22
Payer: COMMERCIAL

## 2023-08-22 NOTE — TELEPHONE ENCOUNTER
COVID Positive/Requesting COVID treatment    Patient is positive for COVID and requesting treatment options.    Date of positive COVID test (PCR or at home)? 8/22/23  Current COVID symptoms: fatigue, muscle or body aches, and headache  Date COVID symptoms began: 8/22/23    [place]i [place] RN on 8/22/2023 at 6:17 PM      Reason for Disposition   [1] Fever > 101 F (38.3 C) AND [2] age > 60 years    Additional Information   Negative: SEVERE difficulty breathing (e.g., struggling for each breath, speaks in single words)   Negative: Difficult to awaken or acting confused (e.g., disoriented, slurred speech)   Negative: Bluish (or gray) lips or face now   Negative: Shock suspected (e.g., cold/pale/clammy skin, too weak to stand, low BP, rapid pulse)   Negative: Sounds like a life-threatening emergency to the triager   Negative: SEVERE or constant chest pain or pressure  (Exception: Mild central chest pain, present only when coughing.)   Negative: MODERATE difficulty breathing (e.g., speaks in phrases, SOB even at rest, pulse 100-120)   Negative: [1] Headache AND [2] stiff neck (can't touch chin to chest)   Negative: Oxygen level (e.g., pulse oximetry) 90 percent or lower   Negative: Chest pain or pressure   Negative: Patient sounds very sick or weak to the triager   Negative: MILD difficulty breathing (e.g., minimal/no SOB at rest, SOB with walking, pulse <100)   Negative: Fever > 103 F (39.4 C)    Protocols used: Coronavirus (COVID-19) Diagnosed or [id number]

## 2023-09-15 ENCOUNTER — LAB (OUTPATIENT)
Dept: LAB | Facility: CLINIC | Age: 73
End: 2023-09-15
Payer: COMMERCIAL

## 2023-09-15 DIAGNOSIS — E03.9 HYPOTHYROIDISM, UNSPECIFIED TYPE: ICD-10-CM

## 2023-09-15 PROCEDURE — 84443 ASSAY THYROID STIM HORMONE: CPT | Mod: ORL | Performed by: INTERNAL MEDICINE

## 2023-09-16 DIAGNOSIS — E03.9 HYPOTHYROIDISM, UNSPECIFIED TYPE: ICD-10-CM

## 2023-09-16 LAB — TSH SERPL DL<=0.005 MIU/L-ACNC: 0.63 UIU/ML (ref 0.3–4.2)

## 2023-09-16 RX ORDER — LEVOTHYROXINE SODIUM 100 UG/1
100 TABLET ORAL DAILY
Qty: 90 TABLET | Refills: 3 | Status: SHIPPED | OUTPATIENT
Start: 2023-09-16 | End: 2024-07-11

## 2023-09-22 ENCOUNTER — THERAPY VISIT (OUTPATIENT)
Dept: OCCUPATIONAL THERAPY | Facility: CLINIC | Age: 73
End: 2023-09-22
Payer: COMMERCIAL

## 2023-09-22 DIAGNOSIS — M79.644 BILATERAL THUMB PAIN: Primary | ICD-10-CM

## 2023-09-22 DIAGNOSIS — M79.645 BILATERAL THUMB PAIN: Primary | ICD-10-CM

## 2023-09-22 PROCEDURE — 97110 THERAPEUTIC EXERCISES: CPT | Mod: GO | Performed by: OCCUPATIONAL THERAPIST

## 2023-10-31 ENCOUNTER — OFFICE VISIT (OUTPATIENT)
Dept: FAMILY MEDICINE | Facility: CLINIC | Age: 73
End: 2023-10-31
Payer: COMMERCIAL

## 2023-10-31 VITALS
WEIGHT: 127 LBS | HEIGHT: 65 IN | OXYGEN SATURATION: 98 % | BODY MASS INDEX: 21.16 KG/M2 | DIASTOLIC BLOOD PRESSURE: 63 MMHG | SYSTOLIC BLOOD PRESSURE: 99 MMHG | HEART RATE: 70 BPM | TEMPERATURE: 97.3 F | RESPIRATION RATE: 15 BRPM

## 2023-10-31 DIAGNOSIS — H69.91 DYSFUNCTION OF RIGHT EUSTACHIAN TUBE: Primary | ICD-10-CM

## 2023-10-31 RX ORDER — SUMATRIPTAN 100 MG/1
TABLET, FILM COATED ORAL
COMMUNITY
Start: 2023-10-31 | End: 2024-05-06

## 2023-10-31 RX ORDER — BRINZOLAMIDE/BRIMONIDINE TARTRATE 10; 2 MG/ML; MG/ML
SUSPENSION/ DROPS OPHTHALMIC
COMMUNITY
Start: 2023-10-30 | End: 2024-05-28

## 2023-10-31 RX ORDER — BRIMONIDINE TARTRATE AND TIMOLOL MALEATE 2; 5 MG/ML; MG/ML
SOLUTION OPHTHALMIC
COMMUNITY
Start: 2023-10-03 | End: 2023-10-31

## 2023-10-31 RX ORDER — UBROGEPANT 100 MG/1
100 TABLET ORAL
COMMUNITY
Start: 2023-10-03

## 2023-10-31 RX ORDER — FLUTICASONE PROPIONATE 50 MCG
1 SPRAY, SUSPENSION (ML) NASAL DAILY
Qty: 16 G | Refills: 3 | Status: SHIPPED | OUTPATIENT
Start: 2023-10-31 | End: 2024-07-11

## 2023-10-31 NOTE — PROGRESS NOTES
"Keven Lopez is a 73 year old female here for the following issues:    Sensation of water in my ear  Sami recently vacationed to [place] and went swimming in the ocean.  With that she felt pressure in her right ear that has been persistent.  No ear pain, no fevers.  She has a drippy nose which she attributes to allergies.  She feels that hearing is dampened on the right side, \"like I got water in my ear\".    Patient Active Problem List   Diagnosis     Migraine with aura and without status migrainosus, not intractable     Glaucoma     Pre-diabetes     Leg cramps     Muscle twitching     Hypothyroidism     Family history of malignant neoplasm of breast     Osteoporosis of lumbar spine     Bilateral thumb pain       Current Outpatient Medications   Medication Sig Dispense Refill     cyanocobalamin (VITAMIN B-12) 1000 MCG SUBL sublingual tablet Place 1 tablet (1,000 mcg) under the tongue daily 90 tablet 3     estradiol (VAGIFEM) 10 MCG TABS vaginal tablet Place 1 tablet (10 mcg) vaginally twice a week 24 tablet 3     fish oil-omega-3 fatty acids 1000 MG capsule Take 2 g by mouth Omega 3 (VEGAN)       gabapentin (NEURONTIN) 300 MG capsule Take 300mg in am , 600mg in evening, doctor in [facility] prescribes. One in the morning and two in the evening. 270 capsule 3     latanoprost (XALATAN) 0.005 % ophthalmic solution INSTILL 1 DROP INTO BOTH EYES AT BEDTIME AS DIRECTED.  4     levothyroxine (SYNTHROID/LEVOTHROID) 100 MCG tablet Take 1 tablet (100 mcg) by mouth daily Note change in dose 90 tablet 3     medical cannabis (Patient's own supply.  Not a prescription) Take by mouth 2 times daily (This is NOT a prescription, and does not certify that the patient has a qualifying medical condition for medical cannabis.  The purpose of this order is  to document that the patient reports taking medical cannabis.)       polyethylene glycol-propylene glycol (SYSTANE ULTRA) 0.4-0.3 % SOLN ophthalmic solution Place 1 drop into " "both eyes every hour as needed.       predniSONE (DELTASONE) 20 MG tablet Take 2 daily x 5 days 10 tablet 0     Ruxolitinib Phosphate (OPZELURA) 1.5 % CREA Externally apply 1 Tube topically 2 times daily as needed For eyelid eczema, rx from dermatology       SIMBRINZA 1-0.2 % ophthalmic suspension        SUMAtriptan (IMITREX) 100 MG tablet Take one at onset of headache . May repeat in 2 hr if needed , max 200mg in 24 hr       UBRELVY 100 MG tablet TAKE 1 TABLET BY MOUTH AT ONSET OF HEADACHE. MAY REPEAT 1 TIME IN 2 HOURS AS NEEDED         No Known Allergies     EXAM  BP 99/63 (BP Location: Left arm, Patient Position: Sitting, Cuff Size: Adult Regular)   Pulse 70   Temp 97.3  F (36.3  C) (Skin)   Resp 15   Ht 1.651 m (5' 5\")   Wt 57.6 kg (127 lb)   SpO2 98%   BMI 21.13 kg/m    Gen: Alert, pleasant, NAD  HEENT:  Conjunctiva nl, TM normal bilaterally, mildly retracted on right. No tenderness with palpation over sinuses.  OP clear, no posterior erythema  COR: S1,S2, no murmur  Lungs: CTA bilaterally, no rhonchi, wheezes or rales      Assessment:  (H69.91) Dysfunction of right eustachian tube  (primary encounter diagnosis)  Comment: new onset with swimming under water, benign but bothersome symptoms  Plan: fluticasone (FLONASE) 50 MCG/ACT nasal spray        Recommend use of fluticasone nasal spray daily until symptoms improve, may take weeks to clear.    Raven Buckley MD  Internal Medicine/Pediatrics    "

## 2023-10-31 NOTE — NURSING NOTE
"73 year old  Chief Complaint   Patient presents with    Ear Problem     Pt reports fullness in their R ear after swimming during their vacation. This has been ongoing a week.        Blood pressure 99/63, pulse 70, temperature 97.3  F (36.3  C), temperature source Skin, resp. rate 15, height 1.651 m (5' 5\"), weight 57.6 kg (127 lb), SpO2 98%. Body mass index is 21.13 kg/m .  Patient Active Problem List   Diagnosis    Migraine with aura and without status migrainosus, not intractable    Glaucoma    Pre-diabetes    Leg cramps    Muscle twitching    Hypothyroidism    Family history of malignant neoplasm of breast    Osteoporosis of lumbar spine    Bilateral thumb pain       Wt Readings from Last 2 Encounters:   10/31/23 57.6 kg (127 lb)   07/13/23 57.8 kg (127 lb 6.4 oz)     BP Readings from Last 3 Encounters:   10/31/23 99/63   07/13/23 118/74   05/23/23 116/72         Current Outpatient Medications   Medication    brimonidine-timolol (COMBIGAN) 0.2-0.5 % ophthalmic solution    cyanocobalamin (VITAMIN B-12) 1000 MCG SUBL sublingual tablet    estradiol (VAGIFEM) 10 MCG TABS vaginal tablet    fish oil-omega-3 fatty acids 1000 MG capsule    gabapentin (NEURONTIN) 300 MG capsule    latanoprost (XALATAN) 0.005 % ophthalmic solution    levothyroxine (SYNTHROID/LEVOTHROID) 100 MCG tablet    medical cannabis (Patient's own supply.  Not a prescription)    polyethylene glycol-propylene glycol (SYSTANE ULTRA) 0.4-0.3 % SOLN ophthalmic solution    predniSONE (DELTASONE) 20 MG tablet    Ruxolitinib Phosphate (OPZELURA) 1.5 % CREA    SIMBRINZA 1-0.2 % ophthalmic suspension    UBRELVY 100 MG tablet    brimonidine (ALPHAGAN) 0.2 % ophthalmic solution    Multiple Vitamin (MULTI-VITAMINS) TABS    rimegepant (NURTEC) 75 MG ODT tablet    Ruxolitinib Phosphate (OPZELURA) 1.5 % CREA    SUMAtriptan (IMITREX) 100 MG tablet     No current facility-administered medications for this visit.       Social History     Tobacco Use    Smoking status: " "Never    Smokeless tobacco: Never   Vaping Use    Vaping Use: Some days    Substances: THC, CBD   Substance Use Topics    Alcohol use: No    Drug use: No       Health Maintenance Due   Topic Date Due    ADVANCE CARE PLANNING  Never done    RSV VACCINE 60+ (1 - 1-dose 60+ series) Never done    MAMMO SCREENING  03/14/2023    FALL RISK ASSESSMENT  07/11/2023    COVID-19 Vaccine (7 - 2023-24 season) 09/01/2023       No results found for: \"PAP\"      October 31, 2023 11:05 AM    "

## 2023-10-31 NOTE — PATIENT INSTRUCTIONS
Using a corticosteroid nasal spray    Steps for using a pump bottle:  Gently blow your nose to clear it of mucus before using the medicine.  Remove the cap. Shake the bottle. The first time you use the pump spray each day, you may have to  prime  it. Do this by squirting a few times into the air until a fine mist comes out.  Tilt your head forward slightly. Breathe out slowly.  Hold the pump bottle with your thumb at the bottom and your index and middle fingers on top. Insert the tip in your nose, aiming the tip toward the back of your head. Use a finger on your other hand to close your nostril on the side not receiving the medicine.  Squeeze the pump as you begin to breathe in slowly through your nose. Repeat these steps for the other nostril. If you are using more than one spray in each nostril, follow all these steps again.  Try not to sneeze or blow your nose just after using the spray.  Helpful hints:  Remember, it may take up to 2 weeks of using a nasal steroid spray before you notice the full effects.

## 2024-03-09 NOTE — PROCEDURE: MIPS QUALITY
Detail Level: Detailed
Quality 111:Pneumonia Vaccination Status For Older Adults: Pneumococcal Vaccination Previously Received
- - -
Quality 130: Documentation Of Current Medications In The Medical Record: Current Medications Documented
Quality 110: Preventive Care And Screening: Influenza Immunization: Influenza Immunization Administered during Influenza season
Quality 431: Preventive Care And Screening: Unhealthy Alcohol Use - Screening: Patient screened for unhealthy alcohol use using a single question and scores less than 2 times per year
Quality 226: Preventive Care And Screening: Tobacco Use: Screening And Cessation Intervention: Patient screened for tobacco use and is an ex/non-smoker

## 2024-03-22 ENCOUNTER — TRANSFERRED RECORDS (OUTPATIENT)
Dept: MULTI SPECIALTY CLINIC | Facility: CLINIC | Age: 74
End: 2024-03-22

## 2024-04-29 NOTE — PROGRESS NOTES
04/29/24 1:35 PM  PATIENT LAB/IMAGING STATUS : No pending lab orders   Milli Pelletier CMA        This 74 year old woman was seen for f/u of her pre-diabetes and osteopenia.  She was first here several years ago to discuss dietary management of diabetes.  At that time, she said she changed her diet to a vegan diet because she was found to have diabetes based on a fasting sugar over 126.  She lost about 10 lbs on this diet and her diabetes resolved.  She had maintained that diet for several years and kept her A1cs in the 5 % range.  She reports no changes in her weight.  She continues to be very active by doing yoga, strength training.   She sees a doctor in [place] to follow this problem.     With respect to her osteopenia, she is getting about 1000 mg of calcium a day from her food.  She drinks 3 glasses of soy milk a day at a minimum.  She also takes vitamin D 1000 international units once a day.  The DEXA scan two  years ago showed that she was in the osteoporotic range but she elected to defer treatment at this time.  She is doing strength training.  She has had no fractures since our last visit.    She continues on 88 mcg of levothyroxine each day.  Her primary care doctor follows this and she will see the primary care doctor soon.      Current Outpatient Medications   Medication Sig Dispense Refill    cyanocobalamin (VITAMIN B-12) 1000 MCG SUBL sublingual tablet Place 1 tablet (1,000 mcg) under the tongue daily 90 tablet 3    estradiol (VAGIFEM) 10 MCG TABS vaginal tablet Place 1 tablet (10 mcg) vaginally twice a week 24 tablet 3    fish oil-omega-3 fatty acids 1000 MG capsule Take 2 g by mouth Omega 3 (VEGAN)      fluticasone (FLONASE) 50 MCG/ACT nasal spray Spray 1 spray into both nostrils daily 16 g 3    gabapentin (NEURONTIN) 300 MG capsule Take 300mg in am , 600mg in evening, doctor in [facility] prescribes. One in the morning and two in the evening. 270 capsule 3    latanoprost (XALATAN) 0.005 % ophthalmic  "solution INSTILL 1 DROP INTO BOTH EYES AT BEDTIME AS DIRECTED.  4    levothyroxine (SYNTHROID/LEVOTHROID) 100 MCG tablet Take 1 tablet (100 mcg) by mouth daily Note change in dose 90 tablet 3    medical cannabis (Patient's own supply.  Not a prescription) Take by mouth 2 times daily (This is NOT a prescription, and does not certify that the patient has a qualifying medical condition for medical cannabis.  The purpose of this order is  to document that the patient reports taking medical cannabis.)      polyethylene glycol-propylene glycol (SYSTANE ULTRA) 0.4-0.3 % SOLN ophthalmic solution Place 1 drop into both eyes every hour as needed.      predniSONE (DELTASONE) 20 MG tablet Take 2 daily x 5 days 10 tablet 0    Ruxolitinib Phosphate (OPZELURA) 1.5 % CREA Externally apply 1 Tube topically 2 times daily as needed For eyelid eczema, rx from dermatology      UBRELVY 100 MG tablet Take 100 mg by mouth at onset of headache Rx from neurologist      SIMBRINZA 1-0.2 % ophthalmic suspension        No current facility-administered medications for this visit.     /68   Pulse 61   Ht 1.651 m (5' 5\")   Wt 57.2 kg (126 lb)   SpO2 98%   BMI 20.97 kg/m    NAD             Impression  The most negative and valid T-score of -3.1 at the level of the lumbar spine corresponds with osteoporosis according to WHO criteria for postmenopausal females and men age 50 and over.      Results   Lumbar spine   T-score -3.1 , BMD 0.810 g/cm2.      Left Femoral  neck  T-score -1.2 , BMD 0.873 g/cm2.  Left Total hip  T-score -1.1 , BMD 0.875 g/cm2.     Right Femoral neck  T-score -1.1, BMD  0.883 g/cm2.  Right Total hip  T-score -0.7 , BMD  0.925 g/cm2.     Interval change  Bone density compared to the prior study has changed at lumbar spine by -5.0%.  Ref 3     Percent changes not mentioned or within remaining regions are insignificant     Please note that the differential diagnosis of BMD increase in the spine includes improvement due to " pharmacotherapy vs inter-current progression of spine degeneration or fracture     Fracture risk   Fracture risk is high or very high considering bone density being in the osteoporotic range  FRAX may not accurately predict risk in patient on bisphosphonate and should not be used to assess the reduction in fracture risk in patients on treatment   The risk of osteoporotic fracture increases approximately 2-fold for each 1.0 SD decrease in T-score.  Low bone density is not the only risk factor for fracture; consider factors such as patient's age, fall risk, injury risk, previous osteoporotic fracture, family history of osteoporosis, etc.       Repeat  For patients eligible for Medicare, routine testing is allowed once every 2 years.   Testing frequency can be increased for patients on corticosteroids.     Clinical correlation recommended      Technical quality  Satisfactory.      Principal result :  Sebastian Dover MD, [facility]  Division of Endocrinology and Diabetes    Department of Medicine    Recent Labs   Lab Test 09/15/23  1429 07/21/23  0858 07/13/22  0945 07/13/22  0945 06/30/21  1453 08/21/19  0853 08/21/19  0831 08/07/18  1433   A1C  --  5.5  --  5.5  --    < > 5.4  --    TSH 0.63 6.74*   < > 2.77 1.13   < >  --  0.42   T4  --  1.44  --   --   --   --   --  1.36   LDL  --  95  --  112* 94   < >  --   --    HDL  --  62  --  65 65   < >  --   --    TRIG  --  81  --  109 126   < >  --   --    CR  --   --   --   --  0.76  --  0.8  --     < > = values in this interval not displayed.   Assessment and plan:    This 74-year-old woman returns to discuss her DEXA scan.  She compared to her last scan, she has lost about 5% of her bone mass.  Her lumbar spine is now -3 for a T-score.  We discussed the pros and cons of going on Fosamax.  She would like to avoid fractures in the future.  She decided to start taking 70 mg of Fosamax each week.  She is well aware of the side effects of osteonecrosis of the jaw  and atypical fractures.  I also told her she might experience some bone pain after starting the drug.  She will let me know if that happens.  She will continue to do her yoga and strength training.  She will continue to ingest calcium and vitamin D.  I will plan to repeat her DEXA in 2 years and see her after that is done.    She has prediabetes and has kept her A1c's in target.  She follows up with a doctor in [place] for this problem so we will check the A1c today.    Salena Vann MD

## 2024-05-06 ENCOUNTER — OFFICE VISIT (OUTPATIENT)
Dept: FAMILY MEDICINE | Facility: CLINIC | Age: 74
End: 2024-05-06
Payer: COMMERCIAL

## 2024-05-06 VITALS
OXYGEN SATURATION: 97 % | BODY MASS INDEX: 21.8 KG/M2 | SYSTOLIC BLOOD PRESSURE: 107 MMHG | TEMPERATURE: 98 F | HEART RATE: 70 BPM | DIASTOLIC BLOOD PRESSURE: 69 MMHG | WEIGHT: 131 LBS

## 2024-05-06 DIAGNOSIS — R73.03 PREDIABETES: ICD-10-CM

## 2024-05-06 DIAGNOSIS — Z01.818 PREOP GENERAL PHYSICAL EXAM: Primary | ICD-10-CM

## 2024-05-06 DIAGNOSIS — H25.89 OTHER AGE-RELATED CATARACT OF BOTH EYES: ICD-10-CM

## 2024-05-06 DIAGNOSIS — E03.9 HYPOTHYROIDISM, UNSPECIFIED TYPE: ICD-10-CM

## 2024-05-06 NOTE — NURSING NOTE
Sami  74 year old    Chief Complaint   Patient presents with    Pre-Op Exam     Cataract for LEFT eye on 5/14/24            Blood pressure 107/69, pulse 70, temperature 98  F (36.7  C), temperature source Skin, weight 59.4 kg (131 lb), SpO2 97%. Body mass index is 21.8 kg/m .    Patient Active Problem List   Diagnosis    Migraine with aura and without status migrainosus, not intractable    Glaucoma    Pre-diabetes    Leg cramps    Muscle twitching    Hypothyroidism    Family history of malignant neoplasm of breast    Osteoporosis of lumbar spine    Bilateral thumb pain              Wt Readings from Last 2 Encounters:   05/06/24 59.4 kg (131 lb)   10/31/23 57.6 kg (127 lb)       BP Readings from Last 3 Encounters:   05/06/24 107/69   10/31/23 99/63   07/13/23 118/74                Current Outpatient Medications   Medication Sig Dispense Refill    cyanocobalamin (VITAMIN B-12) 1000 MCG SUBL sublingual tablet Place 1 tablet (1,000 mcg) under the tongue daily 90 tablet 3    estradiol (VAGIFEM) 10 MCG TABS vaginal tablet Place 1 tablet (10 mcg) vaginally twice a week 24 tablet 3    fish oil-omega-3 fatty acids 1000 MG capsule Take 2 g by mouth Omega 3 (VEGAN)      fluticasone (FLONASE) 50 MCG/ACT nasal spray Spray 1 spray into both nostrils daily 16 g 3    gabapentin (NEURONTIN) 300 MG capsule Take 300mg in am , 600mg in evening, doctor in [facility] prescribes. One in the morning and two in the evening. 270 capsule 3    latanoprost (XALATAN) 0.005 % ophthalmic solution INSTILL 1 DROP INTO BOTH EYES AT BEDTIME AS DIRECTED.  4    levothyroxine (SYNTHROID/LEVOTHROID) 100 MCG tablet Take 1 tablet (100 mcg) by mouth daily Note change in dose 90 tablet 3    medical cannabis (Patient's own supply.  Not a prescription) Take by mouth 2 times daily (This is NOT a prescription, and does not certify that the patient has a qualifying medical condition for medical cannabis.  The purpose of this order is  to document that the patient  "reports taking medical cannabis.)      polyethylene glycol-propylene glycol (SYSTANE ULTRA) 0.4-0.3 % SOLN ophthalmic solution Place 1 drop into both eyes every hour as needed.      predniSONE (DELTASONE) 20 MG tablet Take 2 daily x 5 days 10 tablet 0    Ruxolitinib Phosphate (OPZELURA) 1.5 % CREA Externally apply 1 Tube topically 2 times daily as needed For eyelid eczema, rx from dermatology      SIMBRINZA 1-0.2 % ophthalmic suspension       SUMAtriptan (IMITREX) 100 MG tablet Take one at onset of headache . May repeat in 2 hr if needed , max 200mg in 24 hr      UBRELVY 100 MG tablet TAKE 1 TABLET BY MOUTH AT ONSET OF HEADACHE. MAY REPEAT 1 TIME IN 2 HOURS AS NEEDED       No current facility-administered medications for this visit.              Social History     Tobacco Use    Smoking status: Never    Smokeless tobacco: Never   Vaping Use    Vaping status: Some Days    Substances: THC, CBD   Substance Use Topics    Alcohol use: No    Drug use: No              Health Maintenance Due   Topic Date Due    ADVANCE CARE PLANNING  Never done    MAMMO SCREENING  03/14/2023    FALL RISK ASSESSMENT  07/11/2023    COVID-19 Vaccine (8 - 2023-24 season) 03/25/2024            No results found for: \"PAP\"           May 6, 2024 2:15 PM  "

## 2024-05-06 NOTE — PROGRESS NOTES
Preoperative Evaluation  [facility] [address], [address]  [facility] [address]  Phone: [phone]  Fax: [phone]  Primary Provider: Raven Buckley  Pre-op Performing Provider: RAVEN BUCKLEY  May 6, 2024       Sami is a 74 year old, presenting for the following:  Pre-Op Exam (Cataract for LEFT eye on 5/14/24) and glaucoma surgery on left, followed by right eye cataract extraction      Surgical Information  Surgery/Procedure: Cataract surgery (LEFT), plus stent placement for treatment of glaucoma. Followed by right cataract extraction 2 wks later  Surgery Location: [facility]  Surgeon: Victoria Macdonald MD  Surgery Date: 05/14/2024  and roughly 5/28/24  Time of Surgery: TBD  Where patient plans to recover: At home with family  Fax number for surgical facility: [phone]    Assessment & Plan     The proposed surgical procedure is considered LOW risk.  (Z01.818) Preop general physical exam  (primary encounter diagnosis)  (H25.89) Other age-related cataract of both eyes  Comment: bilateral cataract extractions, also left eye surgery for treatment of glaucoma  Plan: no contraindication to procedure, proceed as planned    (R73.03) Prediabetes  Comment: reports numbers recently checked by her doctor in [place], in nondiabetic range  Lab Results   Component Value Date    A1C 5.5 07/21/2023    A1C 5.5 07/13/2022    A1C 5.6 06/30/2021    A1C 5.5 09/18/2020   Plan: NPO on morning of surgery, she does not take medication for prediabetes    (E03.9) Hypothyroidism, unspecified type  Comment: levothyroxine 100mcg daily dose  Plan: she reports [place] physician recent checked TSH and it was in target range.  May take this prescription on am of surgery with sip of water. Hold all other supplements or medications.    Possible Sleep Apnea: Diagnosed with mild sleep apnea with lying supine, did not tolerate CPAP mask        5/6/2024     5:23 PM   STOP-Bang Total Score   Total Score 1         - No identified additional risk factors other than previously addressed    Additional Medication Instructions  Take only levothyroxine 100mcg dose on morning of surgery with sip of water  Hold all other vitamins, supplements, meds    Recommendation  APPROVAL GIVEN to proceed with proposed procedure, without further diagnostic evaluation.    Raven Buckley MD  Internal Medicine/Pediatrics      Subjective   HPI related to upcoming procedure:   Sami Lopez is a 74 year old female with hx of glaucoma, migraines, prediabetes, hypothyroidism, osteoporosis, muscle twitching. She is to undergo elective cataract extraction due to changes in her vision.              4/29/2024    12:12 PM   Preop Questions   1. Have you ever had a heart attack or stroke? No   2. Have you ever had surgery on your heart or blood vessels, such as a stent placement, a coronary artery bypass, or surgery on an artery in your head, neck, heart, or legs? No   3. Do you have chest pain with activity? No   4. Do you have a history of  heart failure? No   5. Do you currently have a cold, bronchitis or symptoms of other infection? No   6. Do you have a cough, shortness of breath, or wheezing? No   7. Do you or anyone in your family have previous history of blood clots? YES - Sami's mom had blood clot, unclear as to trigger other than sedentary   8. Do you or does anyone in your family have a serious bleeding problem such as prolonged bleeding following surgeries or cuts? No   9. Have you ever had problems with anemia or been told to take iron pills? No   10. Have you had any abnormal blood loss such as black, tarry or bloody stools, or abnormal vaginal bleeding? No   11. Have you ever had a blood transfusion? No   12. Are you willing to have a blood transfusion if it is medically needed before, during, or after your surgery? Yes   13. Have you or any of your relatives ever  "had problems with anesthesia? No   14. Do you have sleep apnea, excessive snoring or daytime drowsiness? YES - mild , supine position only, does not use CPAP   14a. Do you have a CPAP machine? No   15. Do you have any artifical heart valves or other implanted medical devices like a pacemaker, defibrillator, or continuous glucose monitor? No   16. Do you have artificial joints? No   17. Are you allergic to latex? No       Health Care Directive  Patient does not have a Health Care Directive or Living Will: Patient states has Advance Directive and will bring in a copy to clinic.    Preoperative Review of    reviewed - controlled substances reflected in medication list.    Prediabetes  Diet controlled  FBS, checking daily, had A1c 5.8% in December 2023,   Now 5.5% in April  Lab Results   Component Value Date    A1C 5.5 07/21/2023    A1C 5.5 07/13/2022    A1C 5.6 06/30/2021     Hypothyroidism  Levothyroxine, 100mcg daily  Consistent with dosing  Constipation/diarrhea--- none  Change in weight ---none  TSH recently checked in [place], \"normal range\"  TSH   Date Value Ref Range Status   09/15/2023 0.63 0.30 - 4.20 uIU/mL Final   06/30/2021 1.13 0.40 - 4.00 mU/L Final           Patient Active Problem List    Diagnosis Date Noted    Bilateral thumb pain 12/06/2022     Priority: Medium    Osteoporosis of lumbar spine 07/11/2022     Priority: Medium     T -2.7 on DEXA 5/2022, declines medication      Family history of malignant neoplasm of breast 12/26/2015     Priority: Medium    Hypothyroidism 12/17/2015     Priority: Medium    Muscle twitching 08/19/2013     Priority: Medium    Leg cramps 08/02/2013     Priority: Medium    Pre-diabetes 05/13/2013     Priority: Medium    Migraine with aura and without status migrainosus, not intractable      Priority: Medium    Glaucoma      Priority: Medium     Problem list name updated by automated process. Provider to review and confirm  [employer] Update utility        Past Medical " History:   Diagnosis Date    Glaucoma     recently diagnosed    Hypothyroid     Latent tuberculosis infection 7/2/2018    Treated with 9 months of INH in the 1970s    Microscopic hematuria     Work-up negative    Migraines     Prediabetes     Sleep apnea     mild, uses dental device    TMJ (dislocation of temporomandibular joint)      Past Surgical History:   Procedure Laterality Date    BREAST BIOPSY, RT/LT      CLAVICLE SURGERY Left 12/21/2020    surgery in [place], pinning, fixation    COLONOSCOPY  02/27/2012    Procedure:COLONOSCOPY; COLONOSCOPY; Surgeon:JLUIS HUNTER; Location: [facility]    SEPTOPLASTY, TURBINOPLASTY, COMBINED  05/24/2012    Procedure:COMBINED SEPTOPLASTY, TURBINOPLASTY; Septoplasty, Submuccosal Resection Turbinates; Surgeon:AARON ATKINSON; Location:[facility] OR    urethral stricture dilatation       Current Outpatient Medications   Medication Sig Dispense Refill    cyanocobalamin (VITAMIN B-12) 1000 MCG SUBL sublingual tablet Place 1 tablet (1,000 mcg) under the tongue daily 90 tablet 3    estradiol (VAGIFEM) 10 MCG TABS vaginal tablet Place 1 tablet (10 mcg) vaginally twice a week 24 tablet 3    fish oil-omega-3 fatty acids 1000 MG capsule Take 2 g by mouth Omega 3 (VEGAN)      fluticasone (FLONASE) 50 MCG/ACT nasal spray Spray 1 spray into both nostrils daily 16 g 3    gabapentin (NEURONTIN) 300 MG capsule Take 300mg in am , 600mg in evening, doctor in [facility] prescribes. One in the morning and two in the evening. 270 capsule 3    latanoprost (XALATAN) 0.005 % ophthalmic solution INSTILL 1 DROP INTO BOTH EYES AT BEDTIME AS DIRECTED.  4    levothyroxine (SYNTHROID/LEVOTHROID) 100 MCG tablet Take 1 tablet (100 mcg) by mouth daily Note change in dose 90 tablet 3    medical cannabis (Patient's own supply.  Not a prescription) Take by mouth 2 times daily (This is NOT a prescription, and does not certify that the patient has a qualifying medical condition for medical cannabis.  The purpose of this  order is  to document that the patient reports taking medical cannabis.)      polyethylene glycol-propylene glycol (SYSTANE ULTRA) 0.4-0.3 % SOLN ophthalmic solution Place 1 drop into both eyes every hour as needed.      predniSONE (DELTASONE) 20 MG tablet Take 2 daily x 5 days 10 tablet 0    Ruxolitinib Phosphate (OPZELURA) 1.5 % CREA Externally apply 1 Tube topically 2 times daily as needed For eyelid eczema, rx from dermatology      SIMBRINZA 1-0.2 % ophthalmic suspension       UBRELVY 100 MG tablet Take 100 mg by mouth at onset of headache Rx from neurologist         No Known Allergies     Social History     Tobacco Use    Smoking status: Never    Smokeless tobacco: Never   Substance Use Topics    Alcohol use: No     Family History   Problem Relation Age of Onset    Bipolar Disorder Mother     Parkinsonism Mother     Asthma Mother     Pulmonary Embolism Mother     Diabetes Father         Insulin dependent    Lymphoma Father         CLL    Hypertension Father     Hyperlipidemia Father     Migraines Father     Diabetes Sister         prediabetes    Migraines Sister     Breast Cancer Sister 60    Migraines Sister     Diabetes Type 2  Sister         obesity, insulin dependent, obesity,  68 yo    Migraines Brother     Diabetes Brother         overweight    Diabetes Brother         overweight    Hearing Loss Brother     Diabetes Paternal Grandmother     Heart Disease Paternal Grandfather 84        MI    Parkinsonism Maternal Uncle     Parkinsonism Maternal Uncle      History   Drug Use No         Review of Systems    Review of Systems  Constitutional, neuro, ENT, endocrine, pulmonary, cardiac, gastrointestinal, genitourinary, musculoskeletal, integument and psychiatric systems are negative, except as otherwise noted.    Objective    /69 (BP Location: Right arm, Patient Position: Sitting, Cuff Size: Adult Regular)   Pulse 70   Temp 98  F (36.7  C) (Skin)   Wt 59.4 kg (131 lb)   SpO2 97%   BMI 21.80 kg/m   "   Estimated body mass index is 21.13 kg/m  as calculated from the following:    Height as of 10/31/23: 1.651 m (5' 5\").    Weight as of 10/31/23: 57.6 kg (127 lb).  Physical Exam  GENERAL: alert and no distress  EYES: Eyes grossly normal to inspection, PERRL and conjunctivae and sclerae normal, no redness or mattering  HENT: ear canals and TM's normal, nose and mouth clear  NECK: no adenopathy  RESP: lungs clear to auscultation  CV: regular rate and rhythm, normal S1 S2, no murmur  ABDOMEN: soft, nontender, no hepatosplenomegaly, no masses and bowel sounds normal  MS: no gross musculoskeletal defects noted, no edema  SKIN: no suspicious lesions or rashes  NEURO: Normal strength and tone, mentation intact and speech normal  PSYCH: mentation appears normal, affect normal/bright    Recent Labs   Lab Test 07/21/23  0858 07/13/22  0945   A1C 5.5 5.5        Diagnostics  No labs were ordered during this visit.   No EKG required for low risk surgery (cataract, skin procedure, breast biopsy, etc).    Revised Cardiac Risk Index (RCRI)  The patient has the following serious cardiovascular risks for perioperative complications:   - No serious cardiac risks = 0 points     RCRI Interpretation: 0 points: Class I (very low risk - 0.4% complication rate)         Signed Electronically by: Raven Buckley MD  Copy of this evaluation report is provided to requesting physician.         "

## 2024-05-06 NOTE — PATIENT INSTRUCTIONS
Preparing for Your Surgery  Getting started  A nurse will call you to review your health history and instructions. They will give you an arrival time based on your scheduled surgery time. Please be ready to share:  Your doctor's clinic name and phone number  Your medical, surgical, and anesthesia history  A list of allergies and sensitivities  A list of medicines, including herbal treatments and over-the-counter drugs  Whether the patient has a legal guardian (ask how to send us the papers in advance)  Please tell us if you're pregnant--or if there's any chance you might be pregnant. Some surgeries may injure a fetus (unborn baby), so they require a pregnancy test. Surgeries that are safe for a fetus don't always need a test, and you can choose whether to have one.   If you have a child who's having surgery, please ask for a copy of Preparing for Your Child's Surgery.    Preparing for surgery  Within 10 to 30 days of surgery: Have a pre-op exam (sometimes called an H&P, or History and Physical). This can be done at a clinic or pre-operative center.  If you're having a , you may not need this exam. Talk to your care team.  At your pre-op exam, talk to your care team about all medicines you take. If you need to stop any medicines before surgery, ask when to start taking them again.  We do this for your safety. Many medicines can make you bleed too much during surgery. Some change how well surgery (anesthesia) drugs work.  Call your insurance company to let them know you're having surgery. (If you don't have insurance, call [phone].)  Call your clinic if there's any change in your health. This includes signs of a cold or flu (sore throat, runny nose, cough, rash, fever). It also includes a scrape or scratch near the surgery site.  If you have questions on the day of surgery, call your hospital or surgery center.  Eating and drinking guidelines  For your safety: Unless your surgeon tells you otherwise,  follow the guidelines below.  Eat and drink as usual until 8 hours before you arrive for surgery. After that, no food or milk.  Drink clear liquids until 2 hours before you arrive. These are liquids you can see through, like water, Gatorade, and Propel Water. They also include plain black coffee and tea (no cream or milk), candy, and breath mints. You can spit out gum when you arrive.  If you drink alcohol: Stop drinking it the night before surgery.  If your care team tells you to take medicine on the morning of surgery, it's okay to take it with a sip of water.  Preventing infection  Shower or bathe the night before and morning of your surgery. Follow the instructions your clinic gave you. (If no instructions, use regular soap.)  Don't shave or clip hair near your surgery site. We'll remove the hair if needed.  Don't smoke or vape the morning of surgery. You may chew nicotine gum up to 2 hours before surgery. A nicotine patch is okay.  Note: Some surgeries require you to completely quit smoking and nicotine. Check with your surgeon.  Your care team will make every effort to keep you safe from infection. We will:  Clean our hands often with soap and water (or an alcohol-based hand rub).  Clean the skin at your surgery site with a special soap that kills germs.  Give you a special gown to keep you warm. (Cold raises the risk of infection.)  Wear special hair covers, masks, gowns and gloves during surgery.  Give antibiotic medicine, if prescribed. Not all surgeries need antibiotics.  What to bring on the day of surgery  Photo ID and insurance card  Copy of your health care directive, if you have one  Glasses and hearing aids (bring cases)  You can't wear contacts during surgery  Inhaler and eye drops, if you use them (tell us about these when you arrive)  CPAP machine or breathing device, if you use them  A few personal items, if spending the night  If you have . . .  A pacemaker, ICD (cardiac defibrillator) or other  implant: Bring the ID card.  An implanted stimulator: Bring the remote control.  A legal guardian: Bring a copy of the certified (court-stamped) guardianship papers.  Please remove any jewelry, including body piercings. Leave jewelry and other valuables at home.  If you're going home the day of surgery  You must have a responsible adult drive you home. They should stay with you overnight as well.  If you don't have someone to stay with you, and you aren't safe to go home alone, we may keep you overnight. Insurance often won't pay for this.  After surgery  If it's hard to control your pain or you need more pain medicine, please call your surgeon's office.  Questions?   If you have any questions for your care team, list them here: _________________________________________________________________________________________________________________________________________________________________________ ____________________________________ ____________________________________ ____________________________________  For informational purposes only. Not to replace the advice of your health care provider. Copyright   2003, 2019 [employer] [facility]. All rights reserved. Clinically reviewed by Norma Wade MD. [employer] [id number] - REV 12/22.    How to Take Your Medication Before Surgery  - take levothyroxine 100mcg dose with sip of water on morning of surgery

## 2024-05-08 ENCOUNTER — TELEPHONE (OUTPATIENT)
Dept: FAMILY MEDICINE | Facility: CLINIC | Age: 74
End: 2024-05-08

## 2024-05-08 NOTE — TELEPHONE ENCOUNTER
Request received from [facility] for the Pre-operative exam to be faxed to their nursing staff at Fax:  [phone].  This was completed today, 5/8/24 @ 12:30 pm.  FIDE SousaN, RN, [facility]  RN Care Coordinator  [facility]  05/08/24  12:38 PM  Phone: [phone]

## 2024-05-22 ENCOUNTER — ANCILLARY PROCEDURE (OUTPATIENT)
Dept: BONE DENSITY | Facility: CLINIC | Age: 74
End: 2024-05-22
Attending: INTERNAL MEDICINE
Payer: COMMERCIAL

## 2024-05-22 PROCEDURE — 77080 DXA BONE DENSITY AXIAL: CPT | Performed by: INTERNAL MEDICINE

## 2024-05-28 ENCOUNTER — OFFICE VISIT (OUTPATIENT)
Dept: ENDOCRINOLOGY | Facility: CLINIC | Age: 74
End: 2024-05-28
Payer: COMMERCIAL

## 2024-05-28 VITALS
OXYGEN SATURATION: 98 % | DIASTOLIC BLOOD PRESSURE: 68 MMHG | BODY MASS INDEX: 20.99 KG/M2 | HEART RATE: 61 BPM | WEIGHT: 126 LBS | HEIGHT: 65 IN | SYSTOLIC BLOOD PRESSURE: 109 MMHG

## 2024-05-28 DIAGNOSIS — M85.89 OSTEOPENIA OF MULTIPLE SITES: Primary | ICD-10-CM

## 2024-05-28 PROCEDURE — 99213 OFFICE O/P EST LOW 20 MIN: CPT | Performed by: INTERNAL MEDICINE

## 2024-05-28 PROCEDURE — G2211 COMPLEX E/M VISIT ADD ON: HCPCS | Performed by: INTERNAL MEDICINE

## 2024-05-28 RX ORDER — ALENDRONATE SODIUM 70 MG/1
70 TABLET ORAL
Qty: 12 TABLET | Refills: 3 | Status: SHIPPED | OUTPATIENT
Start: 2024-05-28 | End: 2024-08-06

## 2024-05-28 ASSESSMENT — PAIN SCALES - GENERAL: PAINLEVEL: NO PAIN (0)

## 2024-05-28 NOTE — NURSING NOTE
"Chief Complaint   Patient presents with    Osteoporosis     Vital signs:      BP: 109/68 Pulse: 61     SpO2: 98 %     Height: 165.1 cm (5' 5\") Weight: 57.2 kg (126 lb)  Estimated body mass index is 20.97 kg/m  as calculated from the following:    Height as of this encounter: 1.651 m (5' 5\").    Weight as of this encounter: 57.2 kg (126 lb).        "

## 2024-05-28 NOTE — LETTER
5/28/2024       RE: Keven Lopez  [address]  [address]  [place] [address]     Dear Colleague,    Thank you for referring your patient, Keven Lopez, to the [facility] ENDOCRINOLOGY [facility]IC [place] at [facility]. Please see a copy of my visit note below.    04/29/24 1:35 PM  PATIENT LAB/IMAGING STATUS : No pending lab orders   Milli Pelletier CMA        This 74 year old woman was seen for f/u of her pre-diabetes and osteopenia.  She was first here several years ago to discuss dietary management of diabetes.  At that time, she said she changed her diet to a vegan diet because she was found to have diabetes based on a fasting sugar over 126.  She lost about 10 lbs on this diet and her diabetes resolved.  She had maintained that diet for several years and kept her A1cs in the 5 % range.  She reports no changes in her weight.  She continues to be very active by doing yoga, strength training.   She sees a doctor in [place] to follow this problem.     With respect to her osteopenia, she is getting about 1000 mg of calcium a day from her food.  She drinks 3 glasses of soy milk a day at a minimum.  She also takes vitamin D 1000 international units once a day.  The DEXA scan two  years ago showed that she was in the osteoporotic range but she elected to defer treatment at this time.  She is doing strength training.  She has had no fractures since our last visit.    She continues on 88 mcg of levothyroxine each day.  Her primary care doctor follows this and she will see the primary care doctor soon.      Current Outpatient Medications   Medication Sig Dispense Refill    cyanocobalamin (VITAMIN B-12) 1000 MCG SUBL sublingual tablet Place 1 tablet (1,000 mcg) under the tongue daily 90 tablet 3    estradiol (VAGIFEM) 10 MCG TABS vaginal tablet Place 1 tablet (10 mcg) vaginally twice a week 24 tablet 3    fish oil-omega-3 fatty acids  "1000 MG capsule Take 2 g by mouth Omega 3 (VEGAN)      fluticasone (FLONASE) 50 MCG/ACT nasal spray Spray 1 spray into both nostrils daily 16 g 3    gabapentin (NEURONTIN) 300 MG capsule Take 300mg in am , 600mg in evening, doctor in [facility] prescribes. One in the morning and two in the evening. 270 capsule 3    latanoprost (XALATAN) 0.005 % ophthalmic solution INSTILL 1 DROP INTO BOTH EYES AT BEDTIME AS DIRECTED.  4    levothyroxine (SYNTHROID/LEVOTHROID) 100 MCG tablet Take 1 tablet (100 mcg) by mouth daily Note change in dose 90 tablet 3    medical cannabis (Patient's own supply.  Not a prescription) Take by mouth 2 times daily (This is NOT a prescription, and does not certify that the patient has a qualifying medical condition for medical cannabis.  The purpose of this order is  to document that the patient reports taking medical cannabis.)      polyethylene glycol-propylene glycol (SYSTANE ULTRA) 0.4-0.3 % SOLN ophthalmic solution Place 1 drop into both eyes every hour as needed.      predniSONE (DELTASONE) 20 MG tablet Take 2 daily x 5 days 10 tablet 0    Ruxolitinib Phosphate (OPZELURA) 1.5 % CREA Externally apply 1 Tube topically 2 times daily as needed For eyelid eczema, rx from dermatology      UBRELVY 100 MG tablet Take 100 mg by mouth at onset of headache Rx from neurologist      SIMBRINZA 1-0.2 % ophthalmic suspension        No current facility-administered medications for this visit.     /68   Pulse 61   Ht 1.651 m (5' 5\")   Wt 57.2 kg (126 lb)   SpO2 98%   BMI 20.97 kg/m    NAD             Impression  The most negative and valid T-score of -3.1 at the level of the lumbar spine corresponds with osteoporosis according to WHO criteria for postmenopausal females and men age 50 and over.      Results   Lumbar spine   T-score -3.1 , BMD 0.810 g/cm2.      Left Femoral  neck  T-score -1.2 , BMD 0.873 g/cm2.  Left Total hip  T-score -1.1 , BMD 0.875 g/cm2.     Right Femoral neck  T-score -1.1, BMD  " 0.883 g/cm2.  Right Total hip  T-score -0.7 , BMD  0.925 g/cm2.     Interval change  Bone density compared to the prior study has changed at lumbar spine by -5.0%.  Ref 3     Percent changes not mentioned or within remaining regions are insignificant     Please note that the differential diagnosis of BMD increase in the spine includes improvement due to pharmacotherapy vs inter-current progression of spine degeneration or fracture     Fracture risk   Fracture risk is high or very high considering bone density being in the osteoporotic range  FRAX may not accurately predict risk in patient on bisphosphonate and should not be used to assess the reduction in fracture risk in patients on treatment   The risk of osteoporotic fracture increases approximately 2-fold for each 1.0 SD decrease in T-score.  Low bone density is not the only risk factor for fracture; consider factors such as patient's age, fall risk, injury risk, previous osteoporotic fracture, family history of osteoporosis, etc.       Repeat  For patients eligible for Medicare, routine testing is allowed once every 2 years.   Testing frequency can be increased for patients on corticosteroids.     Clinical correlation recommended      Technical quality  Satisfactory.      Principal result :  Sebastian Dover MD, [facility]  Division of Endocrinology and Diabetes    Department of Medicine    Recent Labs   Lab Test 09/15/23  1429 07/21/23  0858 07/13/22  0945 07/13/22  0945 06/30/21  1453 08/21/19  0853 08/21/19  0831 08/07/18  1433   A1C  --  5.5  --  5.5  --    < > 5.4  --    TSH 0.63 6.74*   < > 2.77 1.13   < >  --  0.42   T4  --  1.44  --   --   --   --   --  1.36   LDL  --  95  --  112* 94   < >  --   --    HDL  --  62  --  65 65   < >  --   --    TRIG  --  81  --  109 126   < >  --   --    CR  --   --   --   --  0.76  --  0.8  --     < > = values in this interval not displayed.   Assessment and plan:    This 74-year-old woman returns to discuss  her DEXA scan.  She compared to her last scan, she has lost about 5% of her bone mass.  Her lumbar spine is now -3 for a T-score.  We discussed the pros and cons of going on Fosamax.  She would like to avoid fractures in the future.  She decided to start taking 70 mg of Fosamax each week.  She is well aware of the side effects of osteonecrosis of the jaw and atypical fractures.  I also told her she might experience some bone pain after starting the drug.  She will let me know if that happens.  She will continue to do her yoga and strength training.  She will continue to ingest calcium and vitamin D.  I will plan to repeat her DEXA in 2 years and see her after that is done.    She has prediabetes and has kept her A1c's in target.  She follows up with a doctor in [place] for this problem so we will check the A1c today.    Salena Vann MD

## 2024-07-10 ENCOUNTER — APPOINTMENT (OUTPATIENT)
Dept: URBAN - METROPOLITAN AREA CLINIC 255 | Age: 74
Setting detail: DERMATOLOGY
End: 2024-07-10

## 2024-07-10 ENCOUNTER — OFFICE VISIT (OUTPATIENT)
Dept: FAMILY MEDICINE | Facility: CLINIC | Age: 74
End: 2024-07-10
Payer: COMMERCIAL

## 2024-07-10 VITALS
HEART RATE: 58 BPM | DIASTOLIC BLOOD PRESSURE: 77 MMHG | RESPIRATION RATE: 15 BRPM | TEMPERATURE: 97.5 F | OXYGEN SATURATION: 99 % | SYSTOLIC BLOOD PRESSURE: 116 MMHG

## 2024-07-10 VITALS — HEIGHT: 65 IN | WEIGHT: 127 LBS

## 2024-07-10 DIAGNOSIS — M25.512 ACUTE PAIN OF LEFT SHOULDER: Primary | ICD-10-CM

## 2024-07-10 DIAGNOSIS — D22 MELANOCYTIC NEVI: ICD-10-CM

## 2024-07-10 DIAGNOSIS — M25.561 ACUTE PAIN OF RIGHT KNEE: ICD-10-CM

## 2024-07-10 DIAGNOSIS — D17 BENIGN LIPOMATOUS NEOPLASM: ICD-10-CM

## 2024-07-10 DIAGNOSIS — L82.1 OTHER SEBORRHEIC KERATOSIS: ICD-10-CM

## 2024-07-10 DIAGNOSIS — L81.4 OTHER MELANIN HYPERPIGMENTATION: ICD-10-CM

## 2024-07-10 DIAGNOSIS — I83.9 ASYMPTOMATIC VARICOSE VEINS OF LOWER EXTREMITIES: ICD-10-CM

## 2024-07-10 DIAGNOSIS — L57.8 OTHER SKIN CHANGES DUE TO CHRONIC EXPOSURE TO NONIONIZING RADIATION: ICD-10-CM

## 2024-07-10 DIAGNOSIS — Z71.89 OTHER SPECIFIED COUNSELING: ICD-10-CM

## 2024-07-10 DIAGNOSIS — D18.0 HEMANGIOMA: ICD-10-CM

## 2024-07-10 PROBLEM — D17.23 BENIGN LIPOMATOUS NEOPLASM OF SKIN AND SUBCUTANEOUS TISSUE OF RIGHT LEG: Status: ACTIVE | Noted: 2024-07-10

## 2024-07-10 PROBLEM — D18.01 HEMANGIOMA OF SKIN AND SUBCUTANEOUS TISSUE: Status: ACTIVE | Noted: 2024-07-10

## 2024-07-10 PROBLEM — I83.91 ASYMPTOMATIC VARICOSE VEINS OF RIGHT LOWER EXTREMITY: Status: ACTIVE | Noted: 2024-07-10

## 2024-07-10 PROBLEM — D17.22 BENIGN LIPOMATOUS NEOPLASM OF SKIN AND SUBCUTANEOUS TISSUE OF LEFT ARM: Status: ACTIVE | Noted: 2024-07-10

## 2024-07-10 PROBLEM — D22.5 MELANOCYTIC NEVI OF TRUNK: Status: ACTIVE | Noted: 2024-07-10

## 2024-07-10 PROBLEM — D17.21 BENIGN LIPOMATOUS NEOPLASM OF SKIN AND SUBCUTANEOUS TISSUE OF RIGHT ARM: Status: ACTIVE | Noted: 2024-07-10

## 2024-07-10 PROCEDURE — OTHER COUNSELING: OTHER

## 2024-07-10 PROCEDURE — OTHER MIPS QUALITY: OTHER

## 2024-07-10 PROCEDURE — 99213 OFFICE O/P EST LOW 20 MIN: CPT

## 2024-07-10 ASSESSMENT — LOCATION ZONE DERM
LOCATION ZONE: TRUNK
LOCATION ZONE: LEG
LOCATION ZONE: ARM

## 2024-07-10 ASSESSMENT — LOCATION DETAILED DESCRIPTION DERM
LOCATION DETAILED: RIGHT DISTAL POSTERIOR THIGH
LOCATION DETAILED: LEFT VENTRAL PROXIMAL FOREARM
LOCATION DETAILED: RIGHT ANTERIOR PROXIMAL THIGH
LOCATION DETAILED: LEFT SUPERIOR MEDIAL UPPER BACK
LOCATION DETAILED: RIGHT VENTRAL PROXIMAL FOREARM
LOCATION DETAILED: LEFT MEDIAL UPPER BACK

## 2024-07-10 ASSESSMENT — LOCATION SIMPLE DESCRIPTION DERM
LOCATION SIMPLE: RIGHT THIGH
LOCATION SIMPLE: LEFT UPPER BACK
LOCATION SIMPLE: LEFT FOREARM
LOCATION SIMPLE: RIGHT POSTERIOR THIGH
LOCATION SIMPLE: RIGHT FOREARM
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
## 2024-07-10 NOTE — PATIENT INSTRUCTIONS
ASSESSMENT and PLAN:     LEFT posterior shoulder pain, likely a strain of the latissimus dorsi and scapular stabilizers  Encouraged her to stay with very low weight and isometrics  Suggest working with a  as that person can help you to individualize the exercises for both the shoulder and knee  RIGHT patellofemoral knee pain  Avoid knee compression      Overall, start with a  and if shoulder pain persists, we can refer you to PT here for a shoulder specialist      Xavier Woo MD  Family Medicine and Sports Medicine  [facility]
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
## 2024-07-14 NOTE — PATIENT INSTRUCTIONS
Patient Education   Preventive Care Advice   This is general advice given by our system to help you stay healthy. However, your care team may have specific advice just for you. Please talk to your care team about your preventive care needs.  Nutrition  Eat 5 or more servings of fruits and vegetables each day.  Try wheat bread, brown rice and whole grain pasta (instead of white bread, rice, and pasta).  Get enough calcium and vitamin D. Check the label on foods and aim for 100% of the RDA (recommended daily allowance).  Lifestyle  Exercise at least 150 minutes each week  (30 minutes a day, 5 days a week).  Do muscle strengthening activities 2 days a week. These help control your weight and prevent disease.  No smoking.  Wear sunscreen to prevent skin cancer.  Have a dental exam and cleaning every 6 months.  Yearly exams  See your health care team every year to talk about:  Any changes in your health.  Any medicines your care team has prescribed.  Preventive care, family planning, and ways to prevent chronic diseases.  Shots (vaccines)   HPV shots (up to age 26), if you've never had them before.  Hepatitis B shots (up to age 59), if you've never had them before.  COVID-19 shot: Get this shot when it's due.  Flu shot: Get a flu shot every year.  Tetanus shot: Get a tetanus shot every 10 years.  Pneumococcal, hepatitis A, and RSV shots: Ask your care team if you need these based on your risk.  Shingles shot (for age 50 and up)  General health tests  Diabetes screening:  Starting at age 35, Get screened for diabetes at least every 3 years.  If you are younger than age 35, ask your care team if you should be screened for diabetes.  Cholesterol test: At age 39, start having a cholesterol test every 5 years, or more often if advised.  Bone density scan (DEXA): At age 50, ask your care team if you should have this scan for osteoporosis (brittle bones).  Hepatitis C: Get tested at least once in your life.  STIs (sexually  transmitted infections)  Before age 24: Ask your care team if you should be screened for STIs.  After age 24: Get screened for STIs if you're at risk. You are at risk for STIs (including HIV) if:  You are sexually active with more than one person.  You don't use condoms every time.  You or a partner was diagnosed with a sexually transmitted infection.  If you are at risk for HIV, ask about PrEP medicine to prevent HIV.  Get tested for HIV at least once in your life, whether you are at risk for HIV or not.  Cancer screening tests  Cervical cancer screening: If you have a cervix, begin getting regular cervical cancer screening tests starting at age 21.  Breast cancer scan (mammogram): If you've ever had breasts, begin having regular mammograms starting at age 40. This is a scan to check for breast cancer.  Colon cancer screening: It is important to start screening for colon cancer at age 45.  Have a colonoscopy test every 10 years (or more often if you're at risk) Or, ask your provider about stool tests like a FIT test every year or Cologuard test every 3 years.  To learn more about your testing options, visit:   .  For help making a decision, visit:   https://bit.ly/iu52114.  Prostate cancer screening test: If you have a prostate, ask your care team if a prostate cancer screening test (PSA) at age 55 is right for you.  Lung cancer screening: If you are a current or former smoker ages 50 to 80, ask your care team if ongoing lung cancer screenings are right for you.  For informational purposes only. Not to replace the advice of your health care provider. Copyright   2023 [employer]. All rights reserved. Clinically reviewed by the [facility]. [employer] 456702 - REV 01/24.

## 2024-07-15 ENCOUNTER — OFFICE VISIT (OUTPATIENT)
Dept: FAMILY MEDICINE | Facility: CLINIC | Age: 74
End: 2024-07-15
Payer: COMMERCIAL

## 2024-07-15 VITALS
OXYGEN SATURATION: 97 % | WEIGHT: 129.5 LBS | HEIGHT: 65 IN | RESPIRATION RATE: 16 BRPM | SYSTOLIC BLOOD PRESSURE: 100 MMHG | TEMPERATURE: 97.3 F | DIASTOLIC BLOOD PRESSURE: 63 MMHG | BODY MASS INDEX: 21.58 KG/M2 | HEART RATE: 67 BPM

## 2024-07-15 DIAGNOSIS — I77.810 ASCENDING AORTA DILATATION (H): ICD-10-CM

## 2024-07-15 DIAGNOSIS — Z13.220 LIPID SCREENING: ICD-10-CM

## 2024-07-15 DIAGNOSIS — N95.2 ATROPHIC VAGINITIS: ICD-10-CM

## 2024-07-15 DIAGNOSIS — H69.91 DYSFUNCTION OF RIGHT EUSTACHIAN TUBE: ICD-10-CM

## 2024-07-15 DIAGNOSIS — E03.9 HYPOTHYROIDISM, UNSPECIFIED TYPE: ICD-10-CM

## 2024-07-15 DIAGNOSIS — Z00.00 ENCOUNTER FOR MEDICARE ANNUAL WELLNESS EXAM: Primary | ICD-10-CM

## 2024-07-15 RX ORDER — FLUTICASONE PROPIONATE 50 MCG
1 SPRAY, SUSPENSION (ML) NASAL DAILY
Qty: 16 G | Refills: 3 | Status: SHIPPED | OUTPATIENT
Start: 2024-07-15 | End: 2024-08-05

## 2024-07-15 RX ORDER — ESTRADIOL 10 UG/1
10 INSERT VAGINAL
Qty: 24 TABLET | Refills: 3 | Status: SHIPPED | OUTPATIENT
Start: 2024-07-15 | End: 2024-08-05

## 2024-07-15 RX ORDER — LATANOPROST 50 UG/ML
1 SOLUTION/ DROPS OPHTHALMIC DAILY
COMMUNITY
Start: 2024-07-15

## 2024-07-15 RX ORDER — LEVOTHYROXINE SODIUM 100 UG/1
100 TABLET ORAL DAILY
Qty: 90 TABLET | Refills: 3 | Status: SHIPPED | OUTPATIENT
Start: 2024-07-15 | End: 2024-08-05

## 2024-07-15 SDOH — HEALTH STABILITY: PHYSICAL HEALTH: ON AVERAGE, HOW MANY DAYS PER WEEK DO YOU ENGAGE IN MODERATE TO STRENUOUS EXERCISE (LIKE A BRISK WALK)?: 4 DAYS

## 2024-07-15 ASSESSMENT — SOCIAL DETERMINANTS OF HEALTH (SDOH): HOW OFTEN DO YOU GET TOGETHER WITH FRIENDS OR RELATIVES?: TWICE A WEEK

## 2024-07-15 NOTE — NURSING NOTE
"74 year old  Chief Complaint   Patient presents with    Physical     Taking fosamax, had seen Dr. Woo. Pre-diabetes. Migraines have improved. TSH check. Breast exam.       Blood pressure 100/63, pulse 67, temperature 97.3  F (36.3  C), temperature source Temporal, resp. rate 16, height 1.651 m (5' 5\"), weight 58.7 kg (129 lb 8 oz), SpO2 97%. Body mass index is 21.55 kg/m .  Patient Active Problem List   Diagnosis    Migraine with aura and without status migrainosus, not intractable    Glaucoma    Pre-diabetes    Leg cramps    Muscle twitching    Hypothyroidism    Family history of malignant neoplasm of breast    Osteoporosis of lumbar spine    Bilateral thumb pain       Wt Readings from Last 2 Encounters:   07/15/24 58.7 kg (129 lb 8 oz)   05/28/24 57.2 kg (126 lb)     BP Readings from Last 3 Encounters:   07/15/24 100/63   07/10/24 116/77   05/28/24 109/68         Current Outpatient Medications   Medication Sig Dispense Refill    alendronate (FOSAMAX) 70 MG tablet Take 1 tablet (70 mg) by mouth every 7 days Take 60 minutes before am meal with 8 oz. water. Remain upright for 30 minutes. 12 tablet 3    cyanocobalamin (VITAMIN B-12) 1000 MCG SUBL sublingual tablet Place 1 tablet (1,000 mcg) under the tongue daily 90 tablet 3    estradiol (VAGIFEM) 10 MCG TABS vaginal tablet Place 1 tablet (10 mcg) vaginally twice a week 24 tablet 3    fish oil-omega-3 fatty acids 1000 MG capsule Take 2 g by mouth Omega 3 (VEGAN)      fluticasone (FLONASE) 50 MCG/ACT nasal spray Spray 1 spray into both nostrils daily 16 g 3    gabapentin (NEURONTIN) 300 MG capsule Take 300mg in am , 600mg in evening, doctor in [facility] prescribes. One in the morning and two in the evening. 270 capsule 3    latanoprost (XALATAN) 0.005 % ophthalmic solution INSTILL 1 DROP INTO BOTH EYES AT BEDTIME AS DIRECTED.  4    levothyroxine (SYNTHROID/LEVOTHROID) 100 MCG tablet Take 1 tablet (100 mcg) by mouth daily Note change in dose 90 tablet 3    medical " "cannabis (Patient's own supply.  Not a prescription) Take by mouth 2 times daily (This is NOT a prescription, and does not certify that the patient has a qualifying medical condition for medical cannabis.  The purpose of this order is  to document that the patient reports taking medical cannabis.)      polyethylene glycol-propylene glycol (SYSTANE ULTRA) 0.4-0.3 % SOLN ophthalmic solution Place 1 drop into both eyes every hour as needed.      predniSONE (DELTASONE) 20 MG tablet Take 2 daily x 5 days 10 tablet 0    Ruxolitinib Phosphate (OPZELURA) 1.5 % CREA Externally apply 1 Tube topically 2 times daily as needed For eyelid eczema, rx from dermatology      UBRELVY 100 MG tablet Take 100 mg by mouth at onset of headache Rx from neurologist       No current facility-administered medications for this visit.       Social History     Tobacco Use    Smoking status: Never    Smokeless tobacco: Never   Vaping Use    Vaping status: Some Days    Substances: THC, CBD   Substance Use Topics    Alcohol use: No    Drug use: No       Health Maintenance Due   Topic Date Due    COVID-19 Vaccine (8 - 2023-24 season) 03/25/2024    GLUCOSE  06/30/2024       No results found for: \"PAP\"      July 15, 2024 12:54 PM    "

## 2024-07-16 NOTE — HPI: SKIN LESIONS
Have Your Skin Lesions Been Treated?: not been treated
Is This A New Presentation, Or A Follow-Up?: Skin Lesion
Which Family Member (Optional)?: Maternal uncle
Good

## 2024-08-01 ENCOUNTER — ANCILLARY PROCEDURE (OUTPATIENT)
Dept: CT IMAGING | Facility: CLINIC | Age: 74
End: 2024-08-01
Attending: INTERNAL MEDICINE
Payer: COMMERCIAL

## 2024-08-01 DIAGNOSIS — I77.810 ASCENDING AORTA DILATATION (H): ICD-10-CM

## 2024-08-01 PROCEDURE — 71250 CT THORAX DX C-: CPT | Mod: GC | Performed by: RADIOLOGY

## 2024-08-02 ENCOUNTER — LAB (OUTPATIENT)
Dept: LAB | Facility: CLINIC | Age: 74
End: 2024-08-02
Payer: COMMERCIAL

## 2024-08-02 ENCOUNTER — VIRTUAL VISIT (OUTPATIENT)
Dept: FAMILY MEDICINE | Facility: CLINIC | Age: 74
End: 2024-08-02
Payer: COMMERCIAL

## 2024-08-02 DIAGNOSIS — E03.9 HYPOTHYROIDISM, UNSPECIFIED TYPE: ICD-10-CM

## 2024-08-02 DIAGNOSIS — H93.8X2 SENSATION OF FULLNESS IN LEFT EAR: ICD-10-CM

## 2024-08-02 DIAGNOSIS — R93.89 ABNORMAL CT SCAN, CHEST: ICD-10-CM

## 2024-08-02 DIAGNOSIS — R93.89 ABNORMAL CT SCAN, CHEST: Primary | ICD-10-CM

## 2024-08-02 DIAGNOSIS — Z13.220 LIPID SCREENING: ICD-10-CM

## 2024-08-02 LAB
ANION GAP SERPL CALCULATED.3IONS-SCNC: 9 MMOL/L (ref 7–15)
BUN SERPL-MCNC: 16.1 MG/DL (ref 8–23)
CALCIUM SERPL-MCNC: 9.1 MG/DL (ref 8.8–10.4)
CHLORIDE SERPL-SCNC: 105 MMOL/L (ref 98–107)
CHOLEST SERPL-MCNC: 180 MG/DL
CREAT SERPL-MCNC: 0.99 MG/DL (ref 0.51–0.95)
EGFRCR SERPLBLD CKD-EPI 2021: 60 ML/MIN/1.73M2
FASTING STATUS PATIENT QL REPORTED: YES
FASTING STATUS PATIENT QL REPORTED: YES
GLUCOSE SERPL-MCNC: 89 MG/DL (ref 70–99)
HCO3 SERPL-SCNC: 27 MMOL/L (ref 22–29)
HDLC SERPL-MCNC: 62 MG/DL
LDLC SERPL CALC-MCNC: 104 MG/DL
NONHDLC SERPL-MCNC: 118 MG/DL
POTASSIUM SERPL-SCNC: 4.6 MMOL/L (ref 3.4–5.3)
SODIUM SERPL-SCNC: 141 MMOL/L (ref 135–145)
TRIGL SERPL-MCNC: 70 MG/DL
TSH SERPL DL<=0.005 MIU/L-ACNC: 1.94 UIU/ML (ref 0.3–4.2)

## 2024-08-02 PROCEDURE — 84443 ASSAY THYROID STIM HORMONE: CPT | Mod: ORL | Performed by: INTERNAL MEDICINE

## 2024-08-02 PROCEDURE — 80048 BASIC METABOLIC PNL TOTAL CA: CPT | Mod: ORL | Performed by: INTERNAL MEDICINE

## 2024-08-02 PROCEDURE — 80061 LIPID PANEL: CPT | Mod: ORL | Performed by: INTERNAL MEDICINE

## 2024-08-02 NOTE — NURSING NOTE
"Sami  74 year old    Chief Complaint   Patient presents with    Results     Discuss results of chest CT done yesterday    Ear Fullness     LEFT ear feels like \"liquid is floating around in it\" - wears earplugs, and last time putting an ear plug she felt pressure against her TM            There were no vitals taken for this visit. There is no height or weight on file to calculate BMI.    Patient Active Problem List   Diagnosis    Migraine with aura and without status migrainosus, not intractable    Glaucoma    Pre-diabetes    Leg cramps    Muscle twitching    Hypothyroidism    Family history of malignant neoplasm of breast    Osteoporosis of lumbar spine    Bilateral thumb pain              Wt Readings from Last 2 Encounters:   07/15/24 58.7 kg (129 lb 8 oz)   05/28/24 57.2 kg (126 lb)       BP Readings from Last 3 Encounters:   07/15/24 100/63   07/10/24 116/77   05/28/24 109/68                Current Outpatient Medications   Medication Sig Dispense Refill    alendronate (FOSAMAX) 70 MG tablet Take 1 tablet (70 mg) by mouth every 7 days Take 60 minutes before am meal with 8 oz. water. Remain upright for 30 minutes. 12 tablet 3    cyanocobalamin (VITAMIN B-12) 1000 MCG SUBL sublingual tablet Place 1 tablet (1,000 mcg) under the tongue daily 90 tablet 3    estradiol (VAGIFEM) 10 MCG TABS vaginal tablet Place 1 tablet (10 mcg) vaginally twice a week 24 tablet 3    fish oil-omega-3 fatty acids 1000 MG capsule Take 2 g by mouth Omega 3 (VEGAN)      fluticasone (FLONASE) 50 MCG/ACT nasal spray Spray 1 spray into both nostrils daily 16 g 3    gabapentin (NEURONTIN) 300 MG capsule Take 300mg in am , 600mg in evening, doctor in [facility] prescribes. One in the morning and two in the evening. 270 capsule 3    latanoprost (XALATAN) 0.005 % ophthalmic solution Place 1 drop Into the left eye daily      levothyroxine (SYNTHROID/LEVOTHROID) 100 MCG tablet Take 1 tablet (100 mcg) by mouth daily 90 tablet 3    medical cannabis (Patient's " "own supply.  Not a prescription) Take by mouth 2 times daily (This is NOT a prescription, and does not certify that the patient has a qualifying medical condition for medical cannabis.  The purpose of this order is  to document that the patient reports taking medical cannabis.)      polyethylene glycol-propylene glycol (SYSTANE ULTRA) 0.4-0.3 % SOLN ophthalmic solution Place 1 drop into both eyes every hour as needed.      predniSONE (DELTASONE) 20 MG tablet Take 2 daily x 5 days 10 tablet 0    Ruxolitinib Phosphate (OPZELURA) 1.5 % CREA Externally apply 1 Tube topically 2 times daily as needed For eyelid eczema, rx from dermatology      UBRELVY 100 MG tablet Take 100 mg by mouth at onset of headache Rx from neurologist       No current facility-administered medications for this visit.              Social History     Tobacco Use    Smoking status: Never    Smokeless tobacco: Never   Vaping Use    Vaping status: Some Days    Substances: THC, CBD   Substance Use Topics    Alcohol use: No    Drug use: No              Health Maintenance Due   Topic Date Due    COVID-19 Vaccine (8 - 2023-24 season) 03/25/2024    GLUCOSE  06/30/2024            No results found for: \"PAP\"           August 2, 2024 4:30 PM    " For information on Fall & Injury Prevention, visit: [email or web address] OR  [email or web address] OR  https://www.cdc.gov/steadi/patient.html

## 2024-08-02 NOTE — PROGRESS NOTES
[facility] [address], [address]  [facility] [address]  Phone: [phone]  Fax: [phone]    Patient:  Keven Lopez, Date of birth 1950  Date of Visit:  08/02/2024  Referring Provider Referred Self  Sami is a 74 year old who is being evaluated via a billable video visit.    How would you like to obtain your AVS? MyChart  If the video visit is dropped, the invitation should be resent by: Text to cell phone: [phone]  Will anyone else be joining your video visit? No        Start time: 4:33pm  End time: 4:57 pm   Total : 24 minutes    ASSESSMENT:  (R93.89) Abnormal CT scan, chest  (primary encounter diagnosis)  Comment: incidental finding of 1.2 cm lesions at the left T10-T11 and T11-T12 neural foramen. MRI of the thoracic spine with contrast could be helpful in further evaluation. She reports 2 yr hx  of mid back pain, near bra line, no injury  Plan: MR Thoracic Spine w Contrast, Basic metabolic         panel        Refer for MRI of thoracic spine. DDX includes mass along nerve, schwannoma or other, unclear if mass if attached to bone, muscle, nerves.   She is agreeable to imaging. Follow up once scan is completed.   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  I spoke to Sami about it. Significant nerve impingement would cause lower extremity weakness and sensory changes. She is not having any current symptoms. Recommend observation, no intervention at this time.       (H93.8X2) Sensation of fullness in left ear  Comment: ongoing since July 2024  Plan: discussed trial of flonase nasal spray, discussed proper way to administer medication.     30 minutes spend on the date of this encounter doing chart review, history and exam, documentation and further  "activities as noted above.       Raven Buckley MD         Keven Lopez is a 73 yo woman with hx of migraine with aura, glaucoma, pre-diabetes, hypothyroidism, osteoporosis of lumbar spine. She recently had a CT scan of her chest for surveillance of ascending aortic aneurysm. She wishes to discuss other findings on her scan.     Abnormal MRI  Recent Chest CT without contrast to track ascending aortic dilation  Incidental finding of RUL 5mm nodule, she is low risk and no further workup indicated.   Impression:   Borderline dilation of the ascending thoracic aorta up to 4.0 cm  1.2 cm lesions at the left T10-T11 and T11-T12 neural foramen. MRI of the thoracic spine with contrast could be helpful in further evaluation.    Described midline pain in thoracic area,   Worse with laying supine, better with moving around  uses foam roller  Works with a DO for musculoskeletal concerns  Ongoing x 2 yr, no injury, no numbness tingling  Sensation of a \"catch\" at bra line    Ear fullness  Left ear has felt full since July 4th  Wears ear plugs with drying hair, mild sharp pain, transient  \"Fluid in ear\"  Hx deviated septum surgery, still some narrowing at the left nare.     Patient Active Problem List   Diagnosis    Migraine with aura and without status migrainosus, not intractable    Glaucoma    Pre-diabetes    Leg cramps    Muscle twitching    Hypothyroidism    Family history of malignant neoplasm of breast    Osteoporosis of lumbar spine    Bilateral thumb pain       Current Outpatient Medications   Medication Sig Dispense Refill    alendronate (FOSAMAX) 70 MG tablet Take 1 tablet (70 mg) by mouth every 7 days Take 60 minutes before am meal with 8 oz. water. Remain upright for 30 minutes. 12 tablet 3    cyanocobalamin (VITAMIN B-12) 1000 MCG SUBL sublingual tablet Place 1 tablet (1,000 mcg) under the tongue daily 90 tablet 3    estradiol (VAGIFEM) 10 MCG TABS vaginal tablet Place 1 tablet (10 mcg) vaginally " twice a week 24 tablet 3    fish oil-omega-3 fatty acids 1000 MG capsule Take 2 g by mouth Omega 3 (VEGAN)      fluticasone (FLONASE) 50 MCG/ACT nasal spray Spray 1 spray into both nostrils daily 16 g 3    gabapentin (NEURONTIN) 300 MG capsule Take 300mg in am , 600mg in evening, doctor in [facility] prescribes. One in the morning and two in the evening. 270 capsule 3    latanoprost (XALATAN) 0.005 % ophthalmic solution Place 1 drop Into the left eye daily      levothyroxine (SYNTHROID/LEVOTHROID) 100 MCG tablet Take 1 tablet (100 mcg) by mouth daily 90 tablet 3    medical cannabis (Patient's own supply.  Not a prescription) Take by mouth 2 times daily (This is NOT a prescription, and does not certify that the patient has a qualifying medical condition for medical cannabis.  The purpose of this order is  to document that the patient reports taking medical cannabis.)      polyethylene glycol-propylene glycol (SYSTANE ULTRA) 0.4-0.3 % SOLN ophthalmic solution Place 1 drop into both eyes every hour as needed.      predniSONE (DELTASONE) 20 MG tablet Take 2 daily x 5 days 10 tablet 0    Ruxolitinib Phosphate (OPZELURA) 1.5 % CREA Externally apply 1 Tube topically 2 times daily as needed For eyelid eczema, rx from dermatology      UBRELVY 100 MG tablet Take 100 mg by mouth at onset of headache Rx from neurologist         No Known Allergies     EXAM  There were no vitals taken for this visit.  Gen: Alert, pleasant, NAD, speaking in full sentences  Resp: no labored breathing  Psych: affect normal    Video-Visit Details    Type of service:  Video Visit   Originating Location (pt. Location): Home    Distant Location (provider location):  On-site  Platform used for Video Visit: Lia  Signed Electronically by: Raven Buckley MD

## 2024-08-05 ENCOUNTER — TELEPHONE (OUTPATIENT)
Dept: ENDOCRINOLOGY | Facility: CLINIC | Age: 74
End: 2024-08-05
Payer: COMMERCIAL

## 2024-08-05 ENCOUNTER — MYC MEDICAL ADVICE (OUTPATIENT)
Dept: NURSING | Facility: CLINIC | Age: 74
End: 2024-08-05
Payer: COMMERCIAL

## 2024-08-05 ENCOUNTER — TELEPHONE (OUTPATIENT)
Dept: FAMILY MEDICINE | Facility: CLINIC | Age: 74
End: 2024-08-05

## 2024-08-05 ENCOUNTER — MYC MEDICAL ADVICE (OUTPATIENT)
Dept: FAMILY MEDICINE | Facility: CLINIC | Age: 74
End: 2024-08-05

## 2024-08-05 DIAGNOSIS — E03.9 HYPOTHYROIDISM, UNSPECIFIED TYPE: ICD-10-CM

## 2024-08-05 DIAGNOSIS — M85.89 OSTEOPENIA OF MULTIPLE SITES: ICD-10-CM

## 2024-08-05 DIAGNOSIS — N95.2 ATROPHIC VAGINITIS: ICD-10-CM

## 2024-08-05 DIAGNOSIS — H69.91 DYSFUNCTION OF RIGHT EUSTACHIAN TUBE: ICD-10-CM

## 2024-08-05 RX ORDER — LEVOTHYROXINE SODIUM 100 UG/1
100 TABLET ORAL DAILY
Qty: 90 TABLET | Refills: 3 | Status: SHIPPED | OUTPATIENT
Start: 2024-08-05

## 2024-08-05 RX ORDER — ESTRADIOL 10 UG/1
10 INSERT VAGINAL
Qty: 24 TABLET | Refills: 3 | Status: SHIPPED | OUTPATIENT
Start: 2024-08-05

## 2024-08-05 RX ORDER — FLUTICASONE PROPIONATE 50 MCG
1 SPRAY, SUSPENSION (ML) NASAL DAILY
Qty: 16 G | Refills: 3 | Status: SHIPPED | OUTPATIENT
Start: 2024-08-05

## 2024-08-05 NOTE — TELEPHONE ENCOUNTER
Medication Requested:  alendronate (FOSAMAX) 70 MG tablet 12 tablet 3 5/28/2024 -- --   Sig - Route: Take 1 tablet (70 mg) by mouth every 7 days Take 60 minutes before am meal with 8 oz. water. Remain upright for 30 minutes. - Oral   Sent to pharmacy as: Alendronate Sodium 70 MG Oral Tablet (FOSAMAX)   Class: E-Prescribe   Order: [id number]   E-Prescribing Status: Receipt confirmed by pharmacy (5/28/2024  3:20 PM CDT)     [id number] - [place], [place] - [address] AT [facility] [address]     ----------------------      Refill decision: Refills available at [facility] #[id number] for 12 months per last script written 5/28/24. [employer] message sent to patient.

## 2024-08-05 NOTE — TELEPHONE ENCOUNTER
[facility] Call Center    Phone Message    May a detailed message be left on voicemail: yes     Reason for Call: Medication Refill Request    Has the patient contacted the pharmacy for the refill? Yes   Name of medication being requested:   alendronate (FOSAMAX)    Provider who prescribed the medication: Salena Vann MD  Pharmacy:    [facility] - [place], [place] - [address]     Date medication is needed: 8/5/2024      Action Taken: Other: Endo    Travel Screening: Not Applicable     Date of Service:

## 2024-08-05 NOTE — TELEPHONE ENCOUNTER
Rx cancelled at [facility] and sent to [facility].  [employer] message sent to FIDE SparksN, RN, CCM  RN Care Coordinator  [facility]  08/05/24  9:55 AM  Phone: [phone]

## 2024-08-05 NOTE — TELEPHONE ENCOUNTER
Medication questions (route to triage team)    Who is calling - Sami   Full medication name and dosage -levothyroxine (SYNTHROID/LEVOTHROID) 100 MCG tablet,    estradiol (VAGIFEM) 10 MCG TABS vaginal tablet     Question/clarification needed - Pharmacy change, needs scripts sent here   Name and location of pharmacy   [facility] - [place], [place] - [address]  [address]  [facility] [address]  Phone: [phone] Fax: [phone]  Ok to leave a message on VM? Yes

## 2024-08-06 RX ORDER — ALENDRONATE SODIUM 70 MG/1
70 TABLET ORAL
Qty: 12 TABLET | Refills: 2 | Status: SHIPPED | OUTPATIENT
Start: 2024-08-06

## 2024-08-06 NOTE — TELEPHONE ENCOUNTER
Medication Refill Request        alendronate (FOSAMAX)    Provider who prescribed the medication: Salena Vann MD  Pharmacy:    [facility] - [place], [place] - [address]   Remaining refills sent to requested pharmacy.    Previously sent to [id number] - [place], [place] - [address] AT [facility] [address] : alendronate (FOSAMAX) 70 MG [id number]/28/2024.  Sig - Route: Take 1 tablet (70 mg) by mouth every 7 days Take 60 minutes before am meal with 8 oz. water. Remain upright for 30 minutes. - Oral

## 2024-08-14 ENCOUNTER — TRANSFERRED RECORDS (OUTPATIENT)
Dept: HEALTH INFORMATION MANAGEMENT | Facility: CLINIC | Age: 74
End: 2024-08-14
Payer: COMMERCIAL

## 2024-08-15 ENCOUNTER — MYC MEDICAL ADVICE (OUTPATIENT)
Dept: FAMILY MEDICINE | Facility: CLINIC | Age: 74
End: 2024-08-15

## 2024-08-15 DIAGNOSIS — R79.89 ELEVATED SERUM CREATININE: Primary | ICD-10-CM

## 2024-09-04 ENCOUNTER — MYC MEDICAL ADVICE (OUTPATIENT)
Dept: FAMILY MEDICINE | Facility: CLINIC | Age: 74
End: 2024-09-04

## 2024-09-04 DIAGNOSIS — Z20.822 EXPOSURE TO 2019 NOVEL CORONAVIRUS: Primary | ICD-10-CM

## 2024-09-05 ENCOUNTER — TELEPHONE (OUTPATIENT)
Dept: FAMILY MEDICINE | Facility: CLINIC | Age: 74
End: 2024-09-05

## 2024-09-05 NOTE — TELEPHONE ENCOUNTER
Sami called to report she received her COVID vaccine yesterday and had a temperature overnight of 101.5.  She had a scratchy throat yesterday afternoon after her shot as well, which resolved, but she has the sniffles too and is nervous she may be getting sick?  She is leaving on the 9th for a trip to [facility] and then a cruise with a friend.  She has had 2 negative COVID tests thus far.  I explained the incubation period for COVID is 5 days so the day she is to leave is her day 5.  She said she will have to think about everything and hopefully she'll feel better soon!  FIDE SousaN, RN, [facility]  RN Care Coordinator  [facility]  09/05/24  10:17 AM  Phone: [phone]

## 2024-09-06 ENCOUNTER — LAB (OUTPATIENT)
Dept: LAB | Facility: CLINIC | Age: 74
End: 2024-09-06
Payer: COMMERCIAL

## 2024-09-06 DIAGNOSIS — R79.89 ELEVATED SERUM CREATININE: ICD-10-CM

## 2024-09-06 LAB
ANION GAP SERPL CALCULATED.3IONS-SCNC: 9 MMOL/L (ref 7–15)
BUN SERPL-MCNC: 14.5 MG/DL (ref 8–23)
CALCIUM SERPL-MCNC: 9.3 MG/DL (ref 8.8–10.4)
CHLORIDE SERPL-SCNC: 101 MMOL/L (ref 98–107)
CREAT SERPL-MCNC: 0.9 MG/DL (ref 0.51–0.95)
EGFRCR SERPLBLD CKD-EPI 2021: 67 ML/MIN/1.73M2
GLUCOSE SERPL-MCNC: 95 MG/DL (ref 70–99)
HCO3 SERPL-SCNC: 30 MMOL/L (ref 22–29)
POTASSIUM SERPL-SCNC: 4.2 MMOL/L (ref 3.4–5.3)
SODIUM SERPL-SCNC: 140 MMOL/L (ref 135–145)

## 2024-09-06 PROCEDURE — 80048 BASIC METABOLIC PNL TOTAL CA: CPT | Mod: ORL | Performed by: INTERNAL MEDICINE

## 2024-09-06 NOTE — TELEPHONE ENCOUNTER
Called Sami who is feeling better and believes her symptoms were related to receiving her COVID vaccine.  Dr. Buckley sent in a prescription for Paxlovid for her to bring on her trip with her just in case being her  does have COVID currently.    Rosa Craig, BSN, RN, [facility]  RN Care Coordinator  [facility]  09/06/24  12:44 PM  Phone: [phone]

## 2025-02-17 ENCOUNTER — MYC MEDICAL ADVICE (OUTPATIENT)
Dept: ENDOCRINOLOGY | Facility: CLINIC | Age: 75
End: 2025-02-17
Payer: COMMERCIAL

## 2025-02-17 DIAGNOSIS — M85.89 OSTEOPENIA OF MULTIPLE SITES: ICD-10-CM

## 2025-02-19 DIAGNOSIS — M85.89 OSTEOPENIA OF MULTIPLE SITES: ICD-10-CM

## 2025-02-19 RX ORDER — ALENDRONATE SODIUM 70 MG/1
70 TABLET ORAL
Qty: 12 TABLET | Refills: 5 | Status: SHIPPED | OUTPATIENT
Start: 2025-04-28 | End: 2025-02-19

## 2025-02-19 RX ORDER — ALENDRONATE SODIUM 70 MG/1
70 TABLET ORAL
Qty: 12 TABLET | Refills: 5 | Status: SHIPPED | OUTPATIENT
Start: 2025-04-28

## 2025-04-14 ENCOUNTER — TELEPHONE (OUTPATIENT)
Dept: ENDOCRINOLOGY | Facility: CLINIC | Age: 75
End: 2025-04-14
Payer: COMMERCIAL

## 2025-04-14 NOTE — TELEPHONE ENCOUNTER
[facility] Call Center    Phone Message    May a detailed message be left on voicemail: yes     Reason for Call: Other: Please reach out to patient if she needs to schedule her follow up for 2026 right now. Patient stated she was told to schedule to get medication. Writer let her know template is not released yet and her meds will be filled.     Action Taken: Message routed to:  [facility] endo    Travel Screening: Not Applicable     Date of Service:

## 2025-04-14 NOTE — TELEPHONE ENCOUNTER
Needs DEXA scan 6/2026  with Visit with Soo 2 weeks after. . Malai López RN on 4/14/2025 at 6:09 PM

## 2025-05-16 ENCOUNTER — APPOINTMENT (OUTPATIENT)
Dept: URBAN - METROPOLITAN AREA CLINIC 255 | Age: 75
Setting detail: DERMATOLOGY
End: 2025-05-16

## 2025-05-16 VITALS — WEIGHT: 130 LBS | HEIGHT: 65 IN

## 2025-05-16 DIAGNOSIS — L71.8 OTHER ROSACEA: ICD-10-CM

## 2025-05-16 DIAGNOSIS — L82.1 OTHER SEBORRHEIC KERATOSIS: ICD-10-CM

## 2025-05-16 PROCEDURE — OTHER PATIENT SPECIFIC COUNSELING: OTHER

## 2025-05-16 PROCEDURE — OTHER PHOTO-DOCUMENTATION: OTHER

## 2025-05-16 PROCEDURE — OTHER COUNSELING: OTHER

## 2025-05-16 PROCEDURE — 99213 OFFICE O/P EST LOW 20 MIN: CPT

## 2025-05-16 PROCEDURE — OTHER PRESCRIPTION: OTHER

## 2025-05-16 PROCEDURE — OTHER PRESCRIPTION MEDICATION MANAGEMENT: OTHER

## 2025-05-16 PROCEDURE — OTHER REASSURANCE: OTHER

## 2025-05-16 PROCEDURE — OTHER MIPS QUALITY: OTHER

## 2025-05-16 RX ORDER — METRONIDAZOLE 7.5 MG/G
CREAM TOPICAL QAM
Qty: 45 | Refills: 5 | Status: ERX | COMMUNITY
Start: 2025-05-16

## 2025-05-16 RX ORDER — AZELAIC ACID 0.15 G/G
GEL TOPICAL QHS
Qty: 50 | Refills: 5 | Status: ERX | COMMUNITY
Start: 2025-05-16

## 2025-05-16 ASSESSMENT — LOCATION ZONE DERM
LOCATION ZONE: NECK
LOCATION ZONE: LIP

## 2025-05-16 ASSESSMENT — LOCATION DETAILED DESCRIPTION DERM
LOCATION DETAILED: LEFT UPPER CUTANEOUS LIP
LOCATION DETAILED: RIGHT UPPER CUTANEOUS LIP
LOCATION DETAILED: RIGHT SUPERIOR LATERAL NECK

## 2025-05-16 ASSESSMENT — SEVERITY ASSESSMENT OVERALL AMONG ALL PATIENTS: IN YOUR EXPERIENCE, AMONG ALL PATIENTS YOU HAVE SEEN WITH THIS CONDITION, HOW SEVERE IS THIS PATIENT'S CONDITION?: MILD

## 2025-05-16 ASSESSMENT — LOCATION SIMPLE DESCRIPTION DERM
LOCATION SIMPLE: RIGHT LIP
LOCATION SIMPLE: LEFT LIP
LOCATION SIMPLE: NECK

## 2025-05-16 ASSESSMENT — PAIN INTENSITY VAS: HOW INTENSE IS YOUR PAIN 0 BEING NO PAIN, 10 BEING THE MOST SEVERE PAIN POSSIBLE?: NO PAIN

## 2025-05-24 ENCOUNTER — HEALTH MAINTENANCE LETTER (OUTPATIENT)
Age: 75
End: 2025-05-24

## 2025-05-27 DIAGNOSIS — M85.89 OSTEOPENIA OF MULTIPLE SITES: Primary | ICD-10-CM

## 2025-07-02 ENCOUNTER — TELEPHONE (OUTPATIENT)
Dept: ENDOCRINOLOGY | Facility: CLINIC | Age: 75
End: 2025-07-02

## 2025-07-21 ENCOUNTER — APPOINTMENT (OUTPATIENT)
Dept: URBAN - METROPOLITAN AREA CLINIC 255 | Age: 75
Setting detail: DERMATOLOGY
End: 2025-07-21

## 2025-07-21 VITALS — WEIGHT: 129 LBS | HEIGHT: 65 IN

## 2025-07-21 DIAGNOSIS — L57.8 OTHER SKIN CHANGES DUE TO CHRONIC EXPOSURE TO NONIONIZING RADIATION: ICD-10-CM

## 2025-07-21 DIAGNOSIS — Z87.2 PERSONAL HISTORY OF DISEASES OF THE SKIN AND SUBCUTANEOUS TISSUE: ICD-10-CM

## 2025-07-21 DIAGNOSIS — L82.1 OTHER SEBORRHEIC KERATOSIS: ICD-10-CM

## 2025-07-21 DIAGNOSIS — Z71.89 OTHER SPECIFIED COUNSELING: ICD-10-CM

## 2025-07-21 DIAGNOSIS — D18.0 HEMANGIOMA: ICD-10-CM

## 2025-07-21 DIAGNOSIS — D22 MELANOCYTIC NEVI: ICD-10-CM

## 2025-07-21 PROBLEM — D18.01 HEMANGIOMA OF SKIN AND SUBCUTANEOUS TISSUE: Status: ACTIVE | Noted: 2025-07-21

## 2025-07-21 PROBLEM — D22.61 MELANOCYTIC NEVI OF RIGHT UPPER LIMB, INCLUDING SHOULDER: Status: ACTIVE | Noted: 2025-07-21

## 2025-07-21 PROBLEM — D22.62 MELANOCYTIC NEVI OF LEFT UPPER LIMB, INCLUDING SHOULDER: Status: ACTIVE | Noted: 2025-07-21

## 2025-07-21 PROBLEM — D22.71 MELANOCYTIC NEVI OF RIGHT LOWER LIMB, INCLUDING HIP: Status: ACTIVE | Noted: 2025-07-21

## 2025-07-21 PROBLEM — D22.5 MELANOCYTIC NEVI OF TRUNK: Status: ACTIVE | Noted: 2025-07-21

## 2025-07-21 PROBLEM — D22.72 MELANOCYTIC NEVI OF LEFT LOWER LIMB, INCLUDING HIP: Status: ACTIVE | Noted: 2025-07-21

## 2025-07-21 PROCEDURE — OTHER SUNSCREEN RECOMMENDATIONS: OTHER

## 2025-07-21 PROCEDURE — OTHER PATIENT SPECIFIC COUNSELING: OTHER

## 2025-07-21 PROCEDURE — 99213 OFFICE O/P EST LOW 20 MIN: CPT

## 2025-07-21 PROCEDURE — OTHER MIPS QUALITY: OTHER

## 2025-07-21 PROCEDURE — OTHER COUNSELING: OTHER

## 2025-07-21 ASSESSMENT — LOCATION DETAILED DESCRIPTION DERM
LOCATION DETAILED: LEFT INFERIOR LATERAL MIDBACK
LOCATION DETAILED: RIGHT ANTERIOR PROXIMAL THIGH
LOCATION DETAILED: LEFT DISTAL PRETIBIAL REGION
LOCATION DETAILED: LEFT MEDIAL UPPER BACK
LOCATION DETAILED: INFERIOR THORACIC SPINE
LOCATION DETAILED: RIGHT ANTERIOR DISTAL THIGH
LOCATION DETAILED: LEFT VENTRAL PROXIMAL FOREARM
LOCATION DETAILED: LEFT INFERIOR CENTRAL MALAR CHEEK
LOCATION DETAILED: LEFT DISTAL DORSAL FOREARM
LOCATION DETAILED: MIDDLE STERNUM
LOCATION DETAILED: LEFT VENTRAL DISTAL FOREARM
LOCATION DETAILED: LEFT ANTERIOR DISTAL THIGH
LOCATION DETAILED: RIGHT VENTRAL PROXIMAL FOREARM
LOCATION DETAILED: LEFT ANTERIOR PROXIMAL THIGH
LOCATION DETAILED: RIGHT VENTRAL DISTAL FOREARM
LOCATION DETAILED: LEFT ANTECUBITAL SKIN
LOCATION DETAILED: RIGHT PROXIMAL DORSAL FOREARM
LOCATION DETAILED: EPIGASTRIC SKIN

## 2025-07-21 ASSESSMENT — LOCATION ZONE DERM
LOCATION ZONE: ARM
LOCATION ZONE: FACE
LOCATION ZONE: TRUNK
LOCATION ZONE: LEG

## 2025-07-21 ASSESSMENT — LOCATION SIMPLE DESCRIPTION DERM
LOCATION SIMPLE: CHEST
LOCATION SIMPLE: LEFT PRETIBIAL REGION
LOCATION SIMPLE: RIGHT FOREARM
LOCATION SIMPLE: LEFT THIGH
LOCATION SIMPLE: ABDOMEN
LOCATION SIMPLE: LEFT UPPER ARM
LOCATION SIMPLE: UPPER BACK
LOCATION SIMPLE: LEFT LOWER BACK
LOCATION SIMPLE: LEFT FOREARM
LOCATION SIMPLE: LEFT CHEEK
LOCATION SIMPLE: RIGHT THIGH
LOCATION SIMPLE: LEFT UPPER BACK

## 2025-08-16 ENCOUNTER — HEALTH MAINTENANCE LETTER (OUTPATIENT)
Age: 75
End: 2025-08-16

## 2025-08-26 ENCOUNTER — OFFICE VISIT (OUTPATIENT)
Dept: FAMILY MEDICINE | Facility: CLINIC | Age: 75
End: 2025-08-26
Payer: MEDICARE

## 2025-08-26 VITALS
RESPIRATION RATE: 16 BRPM | WEIGHT: 132 LBS | BODY MASS INDEX: 21.97 KG/M2 | TEMPERATURE: 96.7 F | DIASTOLIC BLOOD PRESSURE: 79 MMHG | OXYGEN SATURATION: 99 % | HEART RATE: 75 BPM | SYSTOLIC BLOOD PRESSURE: 126 MMHG

## 2025-08-26 DIAGNOSIS — Z13.220 LIPID SCREENING: ICD-10-CM

## 2025-08-26 DIAGNOSIS — R73.03 PREDIABETES: Primary | ICD-10-CM

## 2025-08-26 DIAGNOSIS — E03.9 HYPOTHYROIDISM, UNSPECIFIED TYPE: ICD-10-CM

## 2025-08-26 LAB
CHOLEST SERPL-MCNC: 175 MG/DL
EST. AVERAGE GLUCOSE BLD GHB EST-MCNC: 111 MG/DL
FASTING STATUS PATIENT QL REPORTED: YES
HBA1C MFR BLD: 5.5 % (ref 0–5.6)
HDLC SERPL-MCNC: 63 MG/DL
LDLC SERPL CALC-MCNC: 100 MG/DL
NONHDLC SERPL-MCNC: 112 MG/DL
TRIGL SERPL-MCNC: 62 MG/DL
TSH SERPL DL<=0.005 MIU/L-ACNC: 1.31 UIU/ML (ref 0.3–4.2)

## 2025-08-26 RX ORDER — LEVOTHYROXINE SODIUM 100 UG/1
100 TABLET ORAL DAILY
Qty: 90 TABLET | Refills: 3 | Status: SHIPPED | OUTPATIENT
Start: 2025-08-26

## 2025-08-26 ASSESSMENT — PAIN SCALES - GENERAL: PAINLEVEL_OUTOF10: NO PAIN (0)
